# Patient Record
Sex: FEMALE | Race: WHITE | Employment: OTHER | URBAN - METROPOLITAN AREA
[De-identification: names, ages, dates, MRNs, and addresses within clinical notes are randomized per-mention and may not be internally consistent; named-entity substitution may affect disease eponyms.]

---

## 2017-10-10 PROBLEM — R13.14 PHARYNGOESOPHAGEAL DYSPHAGIA: Status: ACTIVE | Noted: 2017-10-10

## 2018-03-29 RX ORDER — NITROGLYCERIN 0.4 MG/1
0.4 TABLET SUBLINGUAL EVERY 5 MIN PRN
Qty: 25 TABLET | Refills: 3 | Status: SHIPPED | OUTPATIENT
Start: 2018-03-29

## 2018-05-27 ENCOUNTER — OFFICE VISIT (OUTPATIENT)
Dept: FAMILY MEDICINE CLINIC | Age: 83
End: 2018-05-27
Payer: MEDICARE

## 2018-05-27 VITALS
OXYGEN SATURATION: 95 % | DIASTOLIC BLOOD PRESSURE: 80 MMHG | SYSTOLIC BLOOD PRESSURE: 120 MMHG | HEART RATE: 75 BPM | TEMPERATURE: 99.1 F | BODY MASS INDEX: 35.08 KG/M2 | WEIGHT: 198 LBS | HEIGHT: 63 IN

## 2018-05-27 DIAGNOSIS — J01.10 ACUTE NON-RECURRENT FRONTAL SINUSITIS: Primary | ICD-10-CM

## 2018-05-27 PROCEDURE — 1123F ACP DISCUSS/DSCN MKR DOCD: CPT | Performed by: PHYSICIAN ASSISTANT

## 2018-05-27 PROCEDURE — 99213 OFFICE O/P EST LOW 20 MIN: CPT | Performed by: PHYSICIAN ASSISTANT

## 2018-05-27 PROCEDURE — G8417 CALC BMI ABV UP PARAM F/U: HCPCS | Performed by: PHYSICIAN ASSISTANT

## 2018-05-27 PROCEDURE — G8427 DOCREV CUR MEDS BY ELIG CLIN: HCPCS | Performed by: PHYSICIAN ASSISTANT

## 2018-05-27 PROCEDURE — 1090F PRES/ABSN URINE INCON ASSESS: CPT | Performed by: PHYSICIAN ASSISTANT

## 2018-05-27 PROCEDURE — 1036F TOBACCO NON-USER: CPT | Performed by: PHYSICIAN ASSISTANT

## 2018-05-27 PROCEDURE — 4040F PNEUMOC VAC/ADMIN/RCVD: CPT | Performed by: PHYSICIAN ASSISTANT

## 2018-05-27 PROCEDURE — G8400 PT W/DXA NO RESULTS DOC: HCPCS | Performed by: PHYSICIAN ASSISTANT

## 2018-05-27 PROCEDURE — G8598 ASA/ANTIPLAT THER USED: HCPCS | Performed by: PHYSICIAN ASSISTANT

## 2018-05-27 RX ORDER — BROMPHENIRAMINE MALEATE, PSEUDOEPHEDRINE HYDROCHLORIDE, AND DEXTROMETHORPHAN HYDROBROMIDE 2; 30; 10 MG/5ML; MG/5ML; MG/5ML
10 SYRUP ORAL 4 TIMES DAILY PRN
Qty: 120 ML | Refills: 0 | Status: SHIPPED | OUTPATIENT
Start: 2018-05-27 | End: 2018-07-17

## 2018-05-27 RX ORDER — AMOXICILLIN 500 MG/1
500 CAPSULE ORAL 3 TIMES DAILY
Qty: 30 CAPSULE | Refills: 0 | Status: SHIPPED | OUTPATIENT
Start: 2018-05-27 | End: 2018-06-06

## 2018-05-29 ENCOUNTER — OFFICE VISIT (OUTPATIENT)
Dept: FAMILY MEDICINE CLINIC | Age: 83
End: 2018-05-29
Payer: MEDICARE

## 2018-05-29 ENCOUNTER — HOSPITAL ENCOUNTER (OUTPATIENT)
Age: 83
Discharge: HOME | End: 2018-05-31
Payer: MEDICARE

## 2018-05-29 VITALS
DIASTOLIC BLOOD PRESSURE: 50 MMHG | WEIGHT: 196 LBS | HEART RATE: 71 BPM | OXYGEN SATURATION: 94 % | SYSTOLIC BLOOD PRESSURE: 100 MMHG | HEIGHT: 63 IN | BODY MASS INDEX: 34.73 KG/M2 | RESPIRATION RATE: 16 BRPM

## 2018-05-29 DIAGNOSIS — E86.0 DEHYDRATION: Primary | ICD-10-CM

## 2018-05-29 DIAGNOSIS — I95.9 HYPOTENSION, UNSPECIFIED HYPOTENSION TYPE: ICD-10-CM

## 2018-05-29 DIAGNOSIS — J06.9 VIRAL URI: ICD-10-CM

## 2018-05-29 LAB
BILIRUBIN, POC: NORMAL
BLOOD URINE, POC: NORMAL
CLARITY, POC: CLEAR
COLOR, POC: YELLOW
GLUCOSE URINE, POC: NORMAL
KETONES, POC: NORMAL
LEUKOCYTE EST, POC: NORMAL
NITRITE, POC: NORMAL
PH, POC: 5.5
PROTEIN, POC: NORMAL
SPECIFIC GRAVITY, POC: 1.02
UROBILINOGEN, POC: 0.2

## 2018-05-29 PROCEDURE — 1036F TOBACCO NON-USER: CPT | Performed by: FAMILY MEDICINE

## 2018-05-29 PROCEDURE — 36415 COLL VENOUS BLD VENIPUNCTURE: CPT | Performed by: FAMILY MEDICINE

## 2018-05-29 PROCEDURE — 81002 URINALYSIS NONAUTO W/O SCOPE: CPT | Performed by: FAMILY MEDICINE

## 2018-05-29 PROCEDURE — G8400 PT W/DXA NO RESULTS DOC: HCPCS | Performed by: FAMILY MEDICINE

## 2018-05-29 PROCEDURE — 80048 BASIC METABOLIC PNL TOTAL CA: CPT

## 2018-05-29 PROCEDURE — 1123F ACP DISCUSS/DSCN MKR DOCD: CPT | Performed by: FAMILY MEDICINE

## 2018-05-29 PROCEDURE — 4040F PNEUMOC VAC/ADMIN/RCVD: CPT | Performed by: FAMILY MEDICINE

## 2018-05-29 PROCEDURE — 1090F PRES/ABSN URINE INCON ASSESS: CPT | Performed by: FAMILY MEDICINE

## 2018-05-29 PROCEDURE — G8427 DOCREV CUR MEDS BY ELIG CLIN: HCPCS | Performed by: FAMILY MEDICINE

## 2018-05-29 PROCEDURE — G8598 ASA/ANTIPLAT THER USED: HCPCS | Performed by: FAMILY MEDICINE

## 2018-05-29 PROCEDURE — 99213 OFFICE O/P EST LOW 20 MIN: CPT | Performed by: FAMILY MEDICINE

## 2018-05-29 PROCEDURE — G8417 CALC BMI ABV UP PARAM F/U: HCPCS | Performed by: FAMILY MEDICINE

## 2018-05-29 ASSESSMENT — ENCOUNTER SYMPTOMS
ABDOMINAL PAIN: 0
SORE THROAT: 1
SHORTNESS OF BREATH: 0

## 2018-05-30 DIAGNOSIS — E86.0 DEHYDRATION: ICD-10-CM

## 2018-05-30 LAB
ANION GAP SERPL CALCULATED.3IONS-SCNC: 20 MMOL/L (ref 7–16)
BUN BLDV-MCNC: 19 MG/DL (ref 8–23)
CALCIUM SERPL-MCNC: 9.7 MG/DL (ref 8.6–10.2)
CHLORIDE BLD-SCNC: 95 MMOL/L (ref 98–107)
CO2: 23 MMOL/L (ref 22–29)
CREAT SERPL-MCNC: 1.4 MG/DL (ref 0.5–1)
GFR AFRICAN AMERICAN: 44
GFR NON-AFRICAN AMERICAN: 36 ML/MIN/1.73
GLUCOSE BLD-MCNC: 117 MG/DL (ref 74–109)
POTASSIUM SERPL-SCNC: 3.7 MMOL/L (ref 3.5–5)
SODIUM BLD-SCNC: 138 MMOL/L (ref 132–146)

## 2018-07-17 ENCOUNTER — OFFICE VISIT (OUTPATIENT)
Dept: FAMILY MEDICINE CLINIC | Age: 83
End: 2018-07-17
Payer: MEDICARE

## 2018-07-17 VITALS
BODY MASS INDEX: 35.08 KG/M2 | DIASTOLIC BLOOD PRESSURE: 82 MMHG | RESPIRATION RATE: 16 BRPM | OXYGEN SATURATION: 98 % | WEIGHT: 198 LBS | SYSTOLIC BLOOD PRESSURE: 132 MMHG | HEART RATE: 50 BPM | HEIGHT: 63 IN

## 2018-07-17 DIAGNOSIS — R42 DIZZINESS: ICD-10-CM

## 2018-07-17 DIAGNOSIS — I10 ESSENTIAL HYPERTENSION: Primary | ICD-10-CM

## 2018-07-17 DIAGNOSIS — R55 PRE-SYNCOPE: ICD-10-CM

## 2018-07-17 DIAGNOSIS — I25.10 CORONARY ARTERY DISEASE INVOLVING NATIVE CORONARY ARTERY OF NATIVE HEART WITHOUT ANGINA PECTORIS: ICD-10-CM

## 2018-07-17 PROCEDURE — 1090F PRES/ABSN URINE INCON ASSESS: CPT | Performed by: FAMILY MEDICINE

## 2018-07-17 PROCEDURE — G8400 PT W/DXA NO RESULTS DOC: HCPCS | Performed by: FAMILY MEDICINE

## 2018-07-17 PROCEDURE — 1036F TOBACCO NON-USER: CPT | Performed by: FAMILY MEDICINE

## 2018-07-17 PROCEDURE — 99213 OFFICE O/P EST LOW 20 MIN: CPT | Performed by: FAMILY MEDICINE

## 2018-07-17 PROCEDURE — 4040F PNEUMOC VAC/ADMIN/RCVD: CPT | Performed by: FAMILY MEDICINE

## 2018-07-17 PROCEDURE — G8417 CALC BMI ABV UP PARAM F/U: HCPCS | Performed by: FAMILY MEDICINE

## 2018-07-17 PROCEDURE — 1101F PT FALLS ASSESS-DOCD LE1/YR: CPT | Performed by: FAMILY MEDICINE

## 2018-07-17 PROCEDURE — 1123F ACP DISCUSS/DSCN MKR DOCD: CPT | Performed by: FAMILY MEDICINE

## 2018-07-17 PROCEDURE — G8598 ASA/ANTIPLAT THER USED: HCPCS | Performed by: FAMILY MEDICINE

## 2018-07-17 PROCEDURE — G8427 DOCREV CUR MEDS BY ELIG CLIN: HCPCS | Performed by: FAMILY MEDICINE

## 2018-07-17 ASSESSMENT — PATIENT HEALTH QUESTIONNAIRE - PHQ9
2. FEELING DOWN, DEPRESSED OR HOPELESS: 0
SUM OF ALL RESPONSES TO PHQ QUESTIONS 1-9: 0
SUM OF ALL RESPONSES TO PHQ9 QUESTIONS 1 & 2: 0
1. LITTLE INTEREST OR PLEASURE IN DOING THINGS: 0

## 2018-07-17 NOTE — PROGRESS NOTES
CC   Chief Complaint   Patient presents with    Hypertension     HPI:   Patient comes in today for the below issue(s). Recheck of dizziness  Thought to have hypotension at last visit triggering positional lightheaded/dizzy feeling  HCTZ was decreased  Issues are still present. Had 2 more severe episodes since last visit  Last 2 episodes Occurred typically in evening while seatied. Feels discomfort over the R side of head as well. She denies vision disturbances, dizziness, tinnitus, dysarthria, dysphagia, or other focal symptoms. She reports concern over underlying cerebrovascular disease. Review of Systems  Review of Systems   Constitutional: Positive for malaise/fatigue. Negative for weight loss. Respiratory: Negative for shortness of breath. Cardiovascular: Positive for leg swelling (Baseline). Negative for chest pain and palpitations. Neurological: Negative for tingling, sensory change, focal weakness, loss of consciousness and headaches.        Past Medical History:   Diagnosis Date    Arthritis     Basal cell cancer 1/2013    nose; Dr Jayant Callaway Coronary artery disease     bypass 2007; follows with Dr Moriah Maza yearly    Diastolic dysfunction 6262    stage 3; follows with Dr. Moriah Maza yearly    Dysphagia     Hyperlipidemia     Hypertension     Hypothyroid     Impaired glucose tolerance     Liver cyst     Lumbosacral radiculopathy at S1 12/2015    left sided    Macular degeneration 6/13    Renal cyst     seen by Dr Maggy Arce; benign    Vertigo 2/2015         PE:  VS:  /82   Pulse 50   Resp 16   Ht 5' 3\" (1.6 m)   Wt 198 lb (89.8 kg)   SpO2 98%   BMI 35.07 kg/m²   Physical Exam  General: Well nourished, well developed, no acute distress  Eyes:  PERRL   Conjunctiva unremarkable   Head: NCAT  ENT:  TM's intact bilaterally, no effusion   Nasal:  No mucosal edema     No nasal drainage   Oral:  mucosa moist and pink             no posterior pharyngeal drainage     no posterior pharyngeal exudate  Neck:  Supple   No palpable cervical lymphoadenopathy   Thyroid without mass or enlargement   No carotid bruits  Resp: Lungs CTAB   Equal and adequate air exchange without accessory muscle use   No rales, rhonchi or wheeze  CV: S1S2 RRR   No murmur   Intact distal pulses   1+ lower extremity edema  GI: Abdomen Soft, non tender, non distended   Normoactive bowel sounds  Skin Warm and dry; no rash or lesion  Neuro: Alert and oriented; no focal deficits identified  Psych: Euthymic mood, congruent affect       Assessment (including specific orders/meds/labs):      Kwaku Marcos was seen today for hypertension. Diagnoses and all orders for this visit:    Essential hypertension    Dizziness  -     MRI BRAIN WO CONTRAST; Future  -     VL DUP CAROTID BILATERAL; Future    Pre-syncope  -     MRI BRAIN WO CONTRAST; Future  -     VL DUP CAROTID BILATERAL; Future    Coronary artery disease involving native coronary artery of native heart without angina pectoris        Plan:     Blood pressures appear to be satisfactory. However she continues to experience focal episodes of vague symptoms that could be considered neurogenic or cardiovascular in origin. She is artery had an extensive cardiac workup within the last 12 months. I will check carotid ultrasounds and MRI of the brain. Open to exclude vascular disease carotids are brain, stroke, intracranial mass, or other focal brain findings    Counseled regarding above diagnosis, including possible risks and complications,  especially if left uncontrolled. Counseled regarding the possible side effects, risks, benefits and alternatives to treatment; patient and/or guardian verbalizes understanding, agrees, feels comfortable with and wishes to proceed with above treatment plan.     Call or go to ED immediately if symptoms worsen or persist. Advised patient to call with any new medication issues, and, as applicable, read all Rx info from pharmacy to assure aware of

## 2018-07-20 ASSESSMENT — ENCOUNTER SYMPTOMS: SHORTNESS OF BREATH: 0

## 2018-07-24 ENCOUNTER — HOSPITAL ENCOUNTER (OUTPATIENT)
Dept: MRI IMAGING | Age: 83
Discharge: HOME | End: 2018-07-26
Payer: MEDICARE

## 2018-07-24 ENCOUNTER — HOSPITAL ENCOUNTER (OUTPATIENT)
Dept: ULTRASOUND IMAGING | Age: 83
Discharge: HOME | End: 2018-07-26
Payer: MEDICARE

## 2018-07-24 DIAGNOSIS — R55 PRE-SYNCOPE: ICD-10-CM

## 2018-07-24 DIAGNOSIS — R42 DIZZINESS: ICD-10-CM

## 2018-07-24 PROCEDURE — 70551 MRI BRAIN STEM W/O DYE: CPT

## 2018-07-24 PROCEDURE — 93880 EXTRACRANIAL BILAT STUDY: CPT

## 2018-07-26 ENCOUNTER — OFFICE VISIT (OUTPATIENT)
Dept: CARDIOLOGY CLINIC | Age: 83
End: 2018-07-26
Payer: MEDICARE

## 2018-07-26 VITALS
HEIGHT: 63 IN | RESPIRATION RATE: 18 BRPM | WEIGHT: 199.8 LBS | SYSTOLIC BLOOD PRESSURE: 126 MMHG | HEART RATE: 52 BPM | BODY MASS INDEX: 35.4 KG/M2 | DIASTOLIC BLOOD PRESSURE: 70 MMHG

## 2018-07-26 DIAGNOSIS — I25.10 CORONARY ARTERY DISEASE INVOLVING NATIVE CORONARY ARTERY OF NATIVE HEART WITHOUT ANGINA PECTORIS: Primary | ICD-10-CM

## 2018-07-26 DIAGNOSIS — R00.1 BRADYCARDIA: ICD-10-CM

## 2018-07-26 DIAGNOSIS — E78.2 MIXED HYPERLIPIDEMIA: ICD-10-CM

## 2018-07-26 DIAGNOSIS — Z95.1 HX OF CABG: ICD-10-CM

## 2018-07-26 DIAGNOSIS — I10 ESSENTIAL HYPERTENSION: ICD-10-CM

## 2018-07-26 PROCEDURE — G8598 ASA/ANTIPLAT THER USED: HCPCS | Performed by: INTERNAL MEDICINE

## 2018-07-26 PROCEDURE — 1090F PRES/ABSN URINE INCON ASSESS: CPT | Performed by: INTERNAL MEDICINE

## 2018-07-26 PROCEDURE — 4040F PNEUMOC VAC/ADMIN/RCVD: CPT | Performed by: INTERNAL MEDICINE

## 2018-07-26 PROCEDURE — G8427 DOCREV CUR MEDS BY ELIG CLIN: HCPCS | Performed by: INTERNAL MEDICINE

## 2018-07-26 PROCEDURE — 93000 ELECTROCARDIOGRAM COMPLETE: CPT | Performed by: INTERNAL MEDICINE

## 2018-07-26 PROCEDURE — 1101F PT FALLS ASSESS-DOCD LE1/YR: CPT | Performed by: INTERNAL MEDICINE

## 2018-07-26 PROCEDURE — G8400 PT W/DXA NO RESULTS DOC: HCPCS | Performed by: INTERNAL MEDICINE

## 2018-07-26 PROCEDURE — 99213 OFFICE O/P EST LOW 20 MIN: CPT | Performed by: INTERNAL MEDICINE

## 2018-07-26 PROCEDURE — 1123F ACP DISCUSS/DSCN MKR DOCD: CPT | Performed by: INTERNAL MEDICINE

## 2018-07-26 PROCEDURE — 1036F TOBACCO NON-USER: CPT | Performed by: INTERNAL MEDICINE

## 2018-07-26 PROCEDURE — G8417 CALC BMI ABV UP PARAM F/U: HCPCS | Performed by: INTERNAL MEDICINE

## 2018-07-26 RX ORDER — GUANFACINE 1 MG/1
TABLET ORAL NIGHTLY
COMMUNITY
Start: 2018-06-10

## 2018-07-26 RX ORDER — VIT A/VIT C/VIT E/ZINC/COPPER 2148-113
TABLET ORAL 2 TIMES DAILY
COMMUNITY

## 2018-07-26 NOTE — PROGRESS NOTES
OFFICE FOLLOW-UP    Name: Erik Lucero    Age: 82 y.o. Date of Service: 7/26/2018    Chief Complaint: Follow-up for CAD s/p CABG in 2007, chest pain    Referring Physician: Dr. Savana Meredith    History of Present Illness:   Evaluated at [facility] on 1/9/18 for complaints of chest pain --> patient experienced subscapular pain/jaw pain that AM (lasted \"about 30 seconds\"). She reported a 2-3 month history of occasional chest pain at that time (left-sided, nonexertional, episodes last \"seconds/minute\"). Negative Lexiscan nuclear stress test on 1/9/18. No further subscapular/jaw pain. Currently with no active cardiac complaints at rest. BP today 126/70.     Review of Systems:  Cardiac: As per HPI  General: No fever, chills  Pulmonary: As per HPI  HEENT: No visual disturbances, difficult swallowing  GI: No nausea, vomiting, abdominal pain  Endocrine: +hyopthyroidism, no DM  Musculoskeletal: CHASE x 4, no focal motor deficits  Skin: Intact, no rashes  Neuro/Psych: No headache or seizures    Past Medical History:  Past Medical History:   Diagnosis Date    Arthritis     Basal cell cancer 1/2013    nose; Dr Sofy Bella Coronary artery disease     bypass 2007; follows with Dr Yasmeen Childs yearly    Diastolic dysfunction 7484    stage 3; follows with Dr. Yasmeen Childs yearly    Dysphagia     Hyperlipidemia     Hypertension     Hypothyroid     Impaired glucose tolerance     Liver cyst     Lumbosacral radiculopathy at S1 12/2015    left sided    Macular degeneration 6/13    Renal cyst     seen by Dr Tierney Panda; benign    Vertigo 2/2015       Past Surgical History:  Past Surgical History:   Procedure Laterality Date    APPENDECTOMY      CARDIAC SURGERY  2007    double bypass @ [facility] (Dr. Alan Martinez)   [address]      right    COLONOSCOPY  2007    Dr Jb Cabral      partial (ovaries remain)     Family History:  Family History   Problem Relation Age of Onset    Heart Disease Mother     Cancer Father lung       Social History:  Social History     Social History    Marital status:      Spouse name: N/A    Number of children: N/A    Years of education: N/A     Occupational History    Not on file.      Social History Main Topics    Smoking status: Former Smoker    Smokeless tobacco: Never Used      Comment: quit in her late 19's    Alcohol use Yes      Comment: rare    Drug use: No    Sexual activity: Not on file     Other Topics Concern    Not on file     Social History Narrative    No narrative on file     Allergies:  No Known Allergies    Current Medications:  Current Outpatient Prescriptions   Medication Sig Dispense Refill    guanFACINE (TENEX) 1 MG tablet       Multiple Vitamins-Minerals (PRESERVISION AREDS) TABS Take by mouth 2 times daily      oxybutynin (DITROPAN-XL) 5 MG extended release tablet Take 1 tablet by mouth every morning 90 tablet 3    furosemide (LASIX) 20 MG tablet TAKE 1 TABLET DAILY 90 tablet 1    enalapril (VASOTEC) 20 MG tablet TAKE 1 TABLET TWICE A  tablet 1    amLODIPine (NORVASC) 10 MG tablet TAKE 1 TABLET DAILY 90 tablet 1    cloNIDine (CATAPRES) 0.1 MG tablet TAKE 1 TABLET TWICE A  tablet 1    atorvastatin (LIPITOR) 20 MG tablet TAKE 1 TABLET DAILY 90 tablet 1    levothyroxine (SYNTHROID) 50 MCG tablet TAKE 1 TABLET DAILY 90 tablet 1    famotidine (PEPCID) 20 MG tablet TAKE 1 TABLET TWICE A  tablet 1    nitroGLYCERIN (NITROSTAT) 0.4 MG SL tablet Place 1 tablet under the tongue every 5 minutes as needed for Chest pain 25 tablet 3    calcium carbonate (CALCIUM 600) 600 MG TABS tablet Take 1 tablet by mouth 2 times daily      busPIRone (BUSPAR) 5 MG tablet Take 1 tablet by mouth as needed (as needed) 1 tab by mouth daily (Patient taking differently: Take 5 mg by mouth daily as needed ) 180 tablet 1    gabapentin (NEURONTIN) 100 MG capsule TAKE 1 CAPSULE BY MOUTH TWO TIMES DAILY (Patient taking differently: Take 100 mg by mouth 2 times daily .) 180 capsule 1    Biotin 25789 MCG TABS Take 1 tablet by mouth every morning       Calcium Polycarbophil (FIBER) 625 MG TABS Take 2 tablets by mouth every morning       vitamin B-12 (CYANOCOBALAMIN) 1000 MCG tablet Take 2,500 mcg by mouth every morning Instructed to hold 5 days pre-op 30 tablet 5    aspirin 81 MG tablet Take 81 mg by mouth nightly        No current facility-administered medications for this visit. Physical Exam:  /70   Pulse 52   Resp 18   Ht 5' 3\" (1.6 m)   Wt 199 lb 12.8 oz (90.6 kg)   BMI 35.39 kg/m²   Wt Readings from Last 3 Encounters:   07/26/18 199 lb 12.8 oz (90.6 kg)   07/17/18 198 lb (89.8 kg)   05/29/18 196 lb (88.9 kg)     Appearance: Awake, alert, no acute respiratory distress  Skin: Intact, no rash  Head: Normocephalic, atraumatic  Eyes: EOMI, no conjunctival erythema  ENMT: No pharyngeal erythema, MMM, no rhinorrhea  Neck: Supple, no elevated JVP, no carotid bruits  Lungs: Clear to auscultation bilaterally. No wheezes, rales, or rhonchi.   Cardiac: Bradycardic, regular rhythm, +S1S2, no murmurs apparent  Abdomen: Soft, nontender, +bowel sounds  Extremities: Moves all extremities x 4, 1+ lower extremity edema  Neurologic: No focal motor deficits apparent, normal mood and affect    Laboratory Tests:  Lab Results   Component Value Date    CREATININE 1.4 (H) 05/29/2018    BUN 19 05/29/2018     05/29/2018    K 3.7 05/29/2018    CL 95 (L) 05/29/2018    CO2 23 05/29/2018     Lab Results   Component Value Date     (H) 04/15/2012     Lab Results   Component Value Date    WBC 5.4 02/01/2018    RBC 4.56 02/01/2018    HGB 12.9 02/01/2018    HCT 41.1 02/01/2018    MCV 90.1 02/01/2018    MCH 28.3 02/01/2018    MCHC 31.4 02/01/2018    RDW 14.6 02/01/2018     02/01/2018    MPV 11.4 02/01/2018     Lab Results   Component Value Date    MG 2.0 02/01/2018     Lab Results   Component Value Date    PROTIME 11.1 03/07/2016    PROTIME 11.1 04/15/2012    INR 1.0 03/07/2016     Lab Results   Component Value Date    TSH 1.210 09/18/2017     Lab Results   Component Value Date    TRIG 90 07/10/2017    TRIG 127 09/12/2016    TRIG 86 03/08/2016     Lab Results   Component Value Date    HDL 72 07/10/2017    HDL 62 09/12/2016    HDL 57 03/08/2016     Lab Results   Component Value Date    LDLCALC 58 07/10/2017    LDLCALC 51 09/12/2016    LDLCALC 49 03/08/2016     Lab Results   Component Value Date    MG 2.0 02/01/2018     Cardiac Tests:  ECG: SB, rate 48, NSSTT changes    February 2015 echocardiogram: EF 60%, normal RV function, stage 2 DD, mild valve disease    Echocardiogram: 3/8/16 (read by Dr. Basim Ronquillo)  Normal left ventricle size and systolic function  Ejection fraction is visually estimated at 65%. Mild left ventricular concentric hypertrophy noted. The left atrium is mildly dilated. Mild mitral regurgitation is present. Mild tricuspid regurgitation. VULCUN nuclear stress test: 3/8/16  1. No evidence of pharmacologic stress-induced myocardial   ischemia. 2. No focal wall motion abnormalities are demonstrated. 3. The left ventricular ejection fraction is 68%. [employer] nuclear stress test: 1/9/17  1. No reversible perfusion defect   2. Ejection fraction is 62 %. 3. No significant wall motion abnormality     Impression/Plan:  1. Evaluation on 1/9/18 at [facility] for chest pain -- no further episodes, negative Lexiscan nuclear stress test on 1/9/18  2. CAD/MI s/p CABG in 2007 (LIMA-LAD and SVG-LCx. Negative stress test > 3 years ago per patient and negative Lexiscan nuclear stress test on 3/8/16 and 1/9/18. 3. HTN - BP today 126/70 ('s-130's at prior cardiology office visits); on multiple antihypertensive agents  4. HLD - on statin  5. Diastolic dysfunction   6. Hypothyroidism - normal TSH in 9/2017  7. CKD  8.  History of sinus bradycardia - coreg dose decreased to 3.125 mg BID in 11/2016 and then subsequently discontinued, previously wore cardiac monitor,

## 2018-08-13 NOTE — PROGRESS NOTES
was seen today for results. Diagnoses and all orders for this visit:    Essential hypertension    Dizziness    Pre-syncope        Imaging and testing reviewed with patient. No significant abnormalities identified. Counseled on continued testing or observation. She is comfortable observing. She'll let us know if symptoms persist. Continue to monitor blood pressure. Counseled regarding above diagnosis, including possible risks and complications,  especially if left uncontrolled. Counseled regarding the possible side effects, risks, benefits and alternatives to treatment; patient and/or guardian verbalizes understanding, agrees, feels comfortable with and wishes to proceed with above treatment plan. Call or go to ED immediately if symptoms worsen or persist. Advised patient to call with any new medication issues, and, as applicable, read all Rx info from pharmacy to assure aware of all possible risks and side effects of medication before taking. As applicable, educational materials and/or home exercises printed for patient's review and were included in patient instructions on his/her After Visit Summary and given to patient at the end of visit. Patient and/or guardian given opportunity to ask questions/raise concerns. She verbalized comfort and understanding of instructions. Follow Up:     Return in about 3 months (around 11/14/2018) for HTN. or sooner if the above issues change unexpectedly or new issues develop     This document was prepared at least partially through the use of voice recognition software. Although effort is taken to assure the accuracy of this document, it is possible that grammatical, syntax,  or spelling errors may occur.       Electronically signed by Mohan Perez MD [facility]

## 2018-08-14 ENCOUNTER — OFFICE VISIT (OUTPATIENT)
Dept: FAMILY MEDICINE CLINIC | Age: 83
End: 2018-08-14
Payer: MEDICARE

## 2018-08-14 VITALS
OXYGEN SATURATION: 97 % | BODY MASS INDEX: 35.44 KG/M2 | SYSTOLIC BLOOD PRESSURE: 128 MMHG | HEART RATE: 64 BPM | HEIGHT: 63 IN | WEIGHT: 200 LBS | DIASTOLIC BLOOD PRESSURE: 86 MMHG | RESPIRATION RATE: 16 BRPM

## 2018-08-14 DIAGNOSIS — R55 PRE-SYNCOPE: ICD-10-CM

## 2018-08-14 DIAGNOSIS — I10 ESSENTIAL HYPERTENSION: Primary | ICD-10-CM

## 2018-08-14 DIAGNOSIS — R42 DIZZINESS: ICD-10-CM

## 2018-08-14 PROCEDURE — 1101F PT FALLS ASSESS-DOCD LE1/YR: CPT | Performed by: FAMILY MEDICINE

## 2018-08-14 PROCEDURE — 4040F PNEUMOC VAC/ADMIN/RCVD: CPT | Performed by: FAMILY MEDICINE

## 2018-08-14 PROCEDURE — G8417 CALC BMI ABV UP PARAM F/U: HCPCS | Performed by: FAMILY MEDICINE

## 2018-08-14 PROCEDURE — G8598 ASA/ANTIPLAT THER USED: HCPCS | Performed by: FAMILY MEDICINE

## 2018-08-14 PROCEDURE — 1036F TOBACCO NON-USER: CPT | Performed by: FAMILY MEDICINE

## 2018-08-14 PROCEDURE — G8400 PT W/DXA NO RESULTS DOC: HCPCS | Performed by: FAMILY MEDICINE

## 2018-08-14 PROCEDURE — G8427 DOCREV CUR MEDS BY ELIG CLIN: HCPCS | Performed by: FAMILY MEDICINE

## 2018-08-14 PROCEDURE — 1123F ACP DISCUSS/DSCN MKR DOCD: CPT | Performed by: FAMILY MEDICINE

## 2018-08-14 PROCEDURE — 99213 OFFICE O/P EST LOW 20 MIN: CPT | Performed by: FAMILY MEDICINE

## 2018-08-14 PROCEDURE — 1090F PRES/ABSN URINE INCON ASSESS: CPT | Performed by: FAMILY MEDICINE

## 2018-09-21 ENCOUNTER — TELEPHONE (OUTPATIENT)
Dept: SURGERY | Age: 83
End: 2018-09-21

## 2018-11-15 ENCOUNTER — HOSPITAL ENCOUNTER (OUTPATIENT)
Age: 83
Discharge: HOME | End: 2018-11-17
Payer: MEDICARE

## 2018-11-15 ENCOUNTER — OFFICE VISIT (OUTPATIENT)
Dept: FAMILY MEDICINE CLINIC | Age: 83
End: 2018-11-15
Payer: MEDICARE

## 2018-11-15 VITALS
RESPIRATION RATE: 16 BRPM | BODY MASS INDEX: 35.61 KG/M2 | SYSTOLIC BLOOD PRESSURE: 144 MMHG | DIASTOLIC BLOOD PRESSURE: 62 MMHG | HEIGHT: 63 IN | WEIGHT: 201 LBS | OXYGEN SATURATION: 97 % | HEART RATE: 47 BPM

## 2018-11-15 DIAGNOSIS — S76.911A MUSCLE STRAIN OF THIGH, RIGHT, INITIAL ENCOUNTER: Primary | ICD-10-CM

## 2018-11-15 DIAGNOSIS — N18.30 CKD (CHRONIC KIDNEY DISEASE) STAGE 3, GFR 30-59 ML/MIN (HCC): ICD-10-CM

## 2018-11-15 DIAGNOSIS — I10 ESSENTIAL HYPERTENSION: ICD-10-CM

## 2018-11-15 DIAGNOSIS — R07.9 CHEST PAIN, UNSPECIFIED TYPE: ICD-10-CM

## 2018-11-15 LAB
ALBUMIN SERPL-MCNC: 4.2 G/DL (ref 3.5–5.2)
ALP BLD-CCNC: 112 U/L (ref 35–104)
ALT SERPL-CCNC: 15 U/L (ref 0–32)
ANION GAP SERPL CALCULATED.3IONS-SCNC: 15 MMOL/L (ref 7–16)
AST SERPL-CCNC: 18 U/L (ref 0–31)
BASOPHILS ABSOLUTE: 0.05 E9/L (ref 0–0.2)
BASOPHILS RELATIVE PERCENT: 0.8 % (ref 0–2)
BILIRUB SERPL-MCNC: 0.6 MG/DL (ref 0–1.2)
BUN BLDV-MCNC: 28 MG/DL (ref 8–23)
CALCIUM SERPL-MCNC: 10 MG/DL (ref 8.6–10.2)
CHLORIDE BLD-SCNC: 106 MMOL/L (ref 98–107)
CHOLESTEROL, TOTAL: 139 MG/DL (ref 0–199)
CO2: 27 MMOL/L (ref 22–29)
CREAT SERPL-MCNC: 1.3 MG/DL (ref 0.5–1)
EOSINOPHILS ABSOLUTE: 0.28 E9/L (ref 0.05–0.5)
EOSINOPHILS RELATIVE PERCENT: 4.7 % (ref 0–6)
GFR AFRICAN AMERICAN: 47
GFR NON-AFRICAN AMERICAN: 39 ML/MIN/1.73
GLUCOSE BLD-MCNC: 94 MG/DL (ref 74–99)
HCT VFR BLD CALC: 42.6 % (ref 34–48)
HDLC SERPL-MCNC: 69 MG/DL
HEMOGLOBIN: 13.4 G/DL (ref 11.5–15.5)
IMMATURE GRANULOCYTES #: 0.01 E9/L
IMMATURE GRANULOCYTES %: 0.2 % (ref 0–5)
LDL CHOLESTEROL CALCULATED: 55 MG/DL (ref 0–99)
LYMPHOCYTES ABSOLUTE: 1.66 E9/L (ref 1.5–4)
LYMPHOCYTES RELATIVE PERCENT: 27.6 % (ref 20–42)
MCH RBC QN AUTO: 29.1 PG (ref 26–35)
MCHC RBC AUTO-ENTMCNC: 31.5 % (ref 32–34.5)
MCV RBC AUTO: 92.6 FL (ref 80–99.9)
MONOCYTES ABSOLUTE: 0.48 E9/L (ref 0.1–0.95)
MONOCYTES RELATIVE PERCENT: 8 % (ref 2–12)
NEUTROPHILS ABSOLUTE: 3.53 E9/L (ref 1.8–7.3)
NEUTROPHILS RELATIVE PERCENT: 58.7 % (ref 43–80)
PDW BLD-RTO: 14.7 FL (ref 11.5–15)
PLATELET # BLD: 234 E9/L (ref 130–450)
PMV BLD AUTO: 11.7 FL (ref 7–12)
POTASSIUM SERPL-SCNC: 3.8 MMOL/L (ref 3.5–5)
RBC # BLD: 4.6 E12/L (ref 3.5–5.5)
SODIUM BLD-SCNC: 148 MMOL/L (ref 132–146)
TOTAL PROTEIN: 7.5 G/DL (ref 6.4–8.3)
TRIGL SERPL-MCNC: 76 MG/DL (ref 0–149)
TSH SERPL DL<=0.05 MIU/L-ACNC: 2.14 UIU/ML (ref 0.27–4.2)
VLDLC SERPL CALC-MCNC: 15 MG/DL
WBC # BLD: 6 E9/L (ref 4.5–11.5)

## 2018-11-15 PROCEDURE — 1036F TOBACCO NON-USER: CPT | Performed by: FAMILY MEDICINE

## 2018-11-15 PROCEDURE — 36415 COLL VENOUS BLD VENIPUNCTURE: CPT | Performed by: FAMILY MEDICINE

## 2018-11-15 PROCEDURE — 4040F PNEUMOC VAC/ADMIN/RCVD: CPT | Performed by: FAMILY MEDICINE

## 2018-11-15 PROCEDURE — 1101F PT FALLS ASSESS-DOCD LE1/YR: CPT | Performed by: FAMILY MEDICINE

## 2018-11-15 PROCEDURE — G8482 FLU IMMUNIZE ORDER/ADMIN: HCPCS | Performed by: FAMILY MEDICINE

## 2018-11-15 PROCEDURE — 1123F ACP DISCUSS/DSCN MKR DOCD: CPT | Performed by: FAMILY MEDICINE

## 2018-11-15 PROCEDURE — G8598 ASA/ANTIPLAT THER USED: HCPCS | Performed by: FAMILY MEDICINE

## 2018-11-15 PROCEDURE — G8417 CALC BMI ABV UP PARAM F/U: HCPCS | Performed by: FAMILY MEDICINE

## 2018-11-15 PROCEDURE — 99214 OFFICE O/P EST MOD 30 MIN: CPT | Performed by: FAMILY MEDICINE

## 2018-11-15 PROCEDURE — 1090F PRES/ABSN URINE INCON ASSESS: CPT | Performed by: FAMILY MEDICINE

## 2018-11-15 PROCEDURE — 80061 LIPID PANEL: CPT

## 2018-11-15 PROCEDURE — 93000 ELECTROCARDIOGRAM COMPLETE: CPT | Performed by: FAMILY MEDICINE

## 2018-11-15 PROCEDURE — G8427 DOCREV CUR MEDS BY ELIG CLIN: HCPCS | Performed by: FAMILY MEDICINE

## 2018-11-15 PROCEDURE — 85025 COMPLETE CBC W/AUTO DIFF WBC: CPT

## 2018-11-15 PROCEDURE — G8400 PT W/DXA NO RESULTS DOC: HCPCS | Performed by: FAMILY MEDICINE

## 2018-11-15 PROCEDURE — 80053 COMPREHEN METABOLIC PANEL: CPT

## 2018-11-15 PROCEDURE — 84443 ASSAY THYROID STIM HORMONE: CPT

## 2018-11-16 ASSESSMENT — ENCOUNTER SYMPTOMS
WHEEZING: 0
SHORTNESS OF BREATH: 0
ABDOMINAL PAIN: 0
COUGH: 0
TROUBLE SWALLOWING: 0
BACK PAIN: 1

## 2018-11-28 ENCOUNTER — OFFICE VISIT (OUTPATIENT)
Dept: FAMILY MEDICINE CLINIC | Age: 83
End: 2018-11-28
Payer: MEDICARE

## 2018-11-28 VITALS
DIASTOLIC BLOOD PRESSURE: 77 MMHG | HEIGHT: 63 IN | RESPIRATION RATE: 16 BRPM | SYSTOLIC BLOOD PRESSURE: 142 MMHG | BODY MASS INDEX: 35.97 KG/M2 | OXYGEN SATURATION: 97 % | HEART RATE: 51 BPM | WEIGHT: 203 LBS

## 2018-11-28 DIAGNOSIS — B96.89 ACUTE BACTERIAL SINUSITIS: ICD-10-CM

## 2018-11-28 DIAGNOSIS — R00.1 BRADYCARDIA: ICD-10-CM

## 2018-11-28 DIAGNOSIS — J01.90 ACUTE BACTERIAL SINUSITIS: ICD-10-CM

## 2018-11-28 DIAGNOSIS — R59.0 ENLARGED LYMPH NODE IN NECK: Primary | ICD-10-CM

## 2018-11-28 PROCEDURE — G8400 PT W/DXA NO RESULTS DOC: HCPCS | Performed by: FAMILY MEDICINE

## 2018-11-28 PROCEDURE — 1036F TOBACCO NON-USER: CPT | Performed by: FAMILY MEDICINE

## 2018-11-28 PROCEDURE — 1101F PT FALLS ASSESS-DOCD LE1/YR: CPT | Performed by: FAMILY MEDICINE

## 2018-11-28 PROCEDURE — 1090F PRES/ABSN URINE INCON ASSESS: CPT | Performed by: FAMILY MEDICINE

## 2018-11-28 PROCEDURE — 1123F ACP DISCUSS/DSCN MKR DOCD: CPT | Performed by: FAMILY MEDICINE

## 2018-11-28 PROCEDURE — 4040F PNEUMOC VAC/ADMIN/RCVD: CPT | Performed by: FAMILY MEDICINE

## 2018-11-28 PROCEDURE — G8427 DOCREV CUR MEDS BY ELIG CLIN: HCPCS | Performed by: FAMILY MEDICINE

## 2018-11-28 PROCEDURE — G8417 CALC BMI ABV UP PARAM F/U: HCPCS | Performed by: FAMILY MEDICINE

## 2018-11-28 PROCEDURE — G8482 FLU IMMUNIZE ORDER/ADMIN: HCPCS | Performed by: FAMILY MEDICINE

## 2018-11-28 PROCEDURE — G8598 ASA/ANTIPLAT THER USED: HCPCS | Performed by: FAMILY MEDICINE

## 2018-11-28 PROCEDURE — 93000 ELECTROCARDIOGRAM COMPLETE: CPT | Performed by: FAMILY MEDICINE

## 2018-11-28 PROCEDURE — 99213 OFFICE O/P EST LOW 20 MIN: CPT | Performed by: FAMILY MEDICINE

## 2018-11-28 RX ORDER — FLUTICASONE PROPIONATE 50 MCG
1 SPRAY, SUSPENSION (ML) NASAL DAILY
Qty: 1 BOTTLE | Refills: 2 | Status: SHIPPED | OUTPATIENT
Start: 2018-11-28 | End: 2019-09-04 | Stop reason: CLARIF

## 2018-11-28 RX ORDER — AMOXICILLIN 875 MG/1
875 TABLET, COATED ORAL 2 TIMES DAILY
Qty: 20 TABLET | Refills: 0 | Status: SHIPPED | OUTPATIENT
Start: 2018-11-28 | End: 2018-12-08

## 2018-11-28 NOTE — PROGRESS NOTES
CC   Chief Complaint   Patient presents with    Neck Pain     HPI:   Patient comes in today for the below issue(s). Neck and jaw pain  New complaint, present for a few weeks. Off and on  R sided, tender under the mandible  Worse with opening mouth wide, yawning, or palpating the glands   No relation to gum pain or dental pain  R ear feels clogged  no fevers      Review of Systems  Review of Systems   Constitutional: Positive for fatigue. Negative for chills and fever. Respiratory: Negative for choking and shortness of breath. Cardiovascular: Negative for chest pain and palpitations.        Past Medical History:   Diagnosis Date    Arthritis     Basal cell cancer 1/2013    nose; Dr Fawn Chilel Coronary artery disease     bypass 2007; follows with Dr Judi Santiago yearly    Diastolic dysfunction 7878    stage 3; follows with Dr. Judi Santiago yearly    Dysphagia     Hyperlipidemia     Hypertension     Hypothyroid     Impaired glucose tolerance     Liver cyst     Lumbosacral radiculopathy at S1 12/2015    left sided    Macular degeneration 6/13    Renal cyst     seen by Dr Rufina Sams; benign    Vertigo 2/2015         PE:  VS:  BP (!) 142/77   Pulse 51   Resp 16   Ht 5' 3\" (1.6 m)   Wt 203 lb (92.1 kg)   SpO2 97%   BMI 35.96 kg/m²   Physical Exam  General: Well nourished, well developed, no acute distress  Eyes:  PERRL   Conjunctiva unremarkable   ENT:  TM's intact bilaterally, no effusion   Nasal:  +mucosal edema     No nasal drainage   Oral:  mucosa moist and pink             +posterior pharyngeal drainage     no posterior pharyngeal exudate    +R sided submandibular LAD   Neck:  Supple   No other palpable cervical lymphoadenopathy   Thyroid without mass or enlargement  Resp: Lungs CTAB   Equal and adequate air exchange without accessory muscle use   No rales, rhonchi or wheeze  CV: S1S2 yasmine but regular   No murmur   Intact distal pulses   No edema  GI: Abdomen Soft, non tender, non

## 2018-12-01 ASSESSMENT — ENCOUNTER SYMPTOMS
CHOKING: 0
SHORTNESS OF BREATH: 0

## 2019-01-02 ENCOUNTER — OFFICE VISIT (OUTPATIENT)
Dept: FAMILY MEDICINE CLINIC | Age: 84
End: 2019-01-02
Payer: MEDICARE

## 2019-01-02 VITALS
BODY MASS INDEX: 35.61 KG/M2 | SYSTOLIC BLOOD PRESSURE: 136 MMHG | HEART RATE: 61 BPM | RESPIRATION RATE: 16 BRPM | HEIGHT: 63 IN | TEMPERATURE: 98.2 F | OXYGEN SATURATION: 98 % | WEIGHT: 201 LBS | DIASTOLIC BLOOD PRESSURE: 82 MMHG

## 2019-01-02 DIAGNOSIS — R68.84 JAW PAIN, NON-TMJ: ICD-10-CM

## 2019-01-02 DIAGNOSIS — R00.2 PALPITATIONS: Primary | ICD-10-CM

## 2019-01-02 PROCEDURE — 1123F ACP DISCUSS/DSCN MKR DOCD: CPT | Performed by: FAMILY MEDICINE

## 2019-01-02 PROCEDURE — 1101F PT FALLS ASSESS-DOCD LE1/YR: CPT | Performed by: FAMILY MEDICINE

## 2019-01-02 PROCEDURE — 1036F TOBACCO NON-USER: CPT | Performed by: FAMILY MEDICINE

## 2019-01-02 PROCEDURE — 4040F PNEUMOC VAC/ADMIN/RCVD: CPT | Performed by: FAMILY MEDICINE

## 2019-01-02 PROCEDURE — 99213 OFFICE O/P EST LOW 20 MIN: CPT | Performed by: FAMILY MEDICINE

## 2019-01-02 PROCEDURE — G8400 PT W/DXA NO RESULTS DOC: HCPCS | Performed by: FAMILY MEDICINE

## 2019-01-02 PROCEDURE — G8598 ASA/ANTIPLAT THER USED: HCPCS | Performed by: FAMILY MEDICINE

## 2019-01-02 PROCEDURE — G8417 CALC BMI ABV UP PARAM F/U: HCPCS | Performed by: FAMILY MEDICINE

## 2019-01-02 PROCEDURE — G8427 DOCREV CUR MEDS BY ELIG CLIN: HCPCS | Performed by: FAMILY MEDICINE

## 2019-01-02 PROCEDURE — 93000 ELECTROCARDIOGRAM COMPLETE: CPT | Performed by: FAMILY MEDICINE

## 2019-01-02 PROCEDURE — G8482 FLU IMMUNIZE ORDER/ADMIN: HCPCS | Performed by: FAMILY MEDICINE

## 2019-01-02 PROCEDURE — 1090F PRES/ABSN URINE INCON ASSESS: CPT | Performed by: FAMILY MEDICINE

## 2019-01-04 ASSESSMENT — ENCOUNTER SYMPTOMS
TROUBLE SWALLOWING: 0
WHEEZING: 0
ABDOMINAL PAIN: 0
SHORTNESS OF BREATH: 0
VOICE CHANGE: 0
SORE THROAT: 0

## 2019-03-05 ENCOUNTER — OFFICE VISIT (OUTPATIENT)
Dept: FAMILY MEDICINE CLINIC | Age: 84
End: 2019-03-05
Payer: MEDICARE

## 2019-03-05 VITALS
SYSTOLIC BLOOD PRESSURE: 141 MMHG | HEIGHT: 63 IN | DIASTOLIC BLOOD PRESSURE: 77 MMHG | RESPIRATION RATE: 16 BRPM | OXYGEN SATURATION: 97 % | BODY MASS INDEX: 35.61 KG/M2 | HEART RATE: 57 BPM | WEIGHT: 201 LBS

## 2019-03-05 DIAGNOSIS — I10 ESSENTIAL HYPERTENSION: ICD-10-CM

## 2019-03-05 DIAGNOSIS — R59.0 ENLARGED LYMPH NODE IN NECK: Primary | ICD-10-CM

## 2019-03-05 DIAGNOSIS — I25.10 CORONARY ARTERY DISEASE INVOLVING NATIVE CORONARY ARTERY OF NATIVE HEART WITHOUT ANGINA PECTORIS: ICD-10-CM

## 2019-03-05 PROCEDURE — 4040F PNEUMOC VAC/ADMIN/RCVD: CPT | Performed by: FAMILY MEDICINE

## 2019-03-05 PROCEDURE — 1090F PRES/ABSN URINE INCON ASSESS: CPT | Performed by: FAMILY MEDICINE

## 2019-03-05 PROCEDURE — G8400 PT W/DXA NO RESULTS DOC: HCPCS | Performed by: FAMILY MEDICINE

## 2019-03-05 PROCEDURE — G8482 FLU IMMUNIZE ORDER/ADMIN: HCPCS | Performed by: FAMILY MEDICINE

## 2019-03-05 PROCEDURE — 99213 OFFICE O/P EST LOW 20 MIN: CPT | Performed by: FAMILY MEDICINE

## 2019-03-05 PROCEDURE — 1036F TOBACCO NON-USER: CPT | Performed by: FAMILY MEDICINE

## 2019-03-05 PROCEDURE — G8598 ASA/ANTIPLAT THER USED: HCPCS | Performed by: FAMILY MEDICINE

## 2019-03-05 PROCEDURE — G8427 DOCREV CUR MEDS BY ELIG CLIN: HCPCS | Performed by: FAMILY MEDICINE

## 2019-03-05 PROCEDURE — G8417 CALC BMI ABV UP PARAM F/U: HCPCS | Performed by: FAMILY MEDICINE

## 2019-03-05 PROCEDURE — 1123F ACP DISCUSS/DSCN MKR DOCD: CPT | Performed by: FAMILY MEDICINE

## 2019-03-05 PROCEDURE — 1101F PT FALLS ASSESS-DOCD LE1/YR: CPT | Performed by: FAMILY MEDICINE

## 2019-03-05 RX ORDER — NIFEDIPINE 30 MG/1
TABLET, EXTENDED RELEASE ORAL
Refills: 0 | COMMUNITY
Start: 2019-01-08 | End: 2020-01-09 | Stop reason: SDUPTHER

## 2019-03-05 ASSESSMENT — PATIENT HEALTH QUESTIONNAIRE - PHQ9
1. LITTLE INTEREST OR PLEASURE IN DOING THINGS: 0
SUM OF ALL RESPONSES TO PHQ QUESTIONS 1-9: 0
SUM OF ALL RESPONSES TO PHQ QUESTIONS 1-9: 0
2. FEELING DOWN, DEPRESSED OR HOPELESS: 0
SUM OF ALL RESPONSES TO PHQ9 QUESTIONS 1 & 2: 0

## 2019-03-06 ASSESSMENT — ENCOUNTER SYMPTOMS
CHOKING: 0
WHEEZING: 0
COUGH: 0
ABDOMINAL PAIN: 0
SHORTNESS OF BREATH: 0

## 2019-03-09 ENCOUNTER — HOSPITAL ENCOUNTER (OUTPATIENT)
Dept: CT IMAGING | Age: 84
Discharge: HOME | End: 2019-03-11
Payer: MEDICARE

## 2019-03-09 DIAGNOSIS — R59.0 ENLARGED LYMPH NODE IN NECK: ICD-10-CM

## 2019-03-09 PROCEDURE — 70490 CT SOFT TISSUE NECK W/O DYE: CPT

## 2019-04-11 ENCOUNTER — HOSPITAL ENCOUNTER (OUTPATIENT)
Age: 84
Discharge: HOME | End: 2019-04-13
Payer: MEDICARE

## 2019-04-11 ENCOUNTER — OFFICE VISIT (OUTPATIENT)
Dept: FAMILY MEDICINE CLINIC | Age: 84
End: 2019-04-11
Payer: MEDICARE

## 2019-04-11 ENCOUNTER — HOSPITAL ENCOUNTER (OUTPATIENT)
Dept: GENERAL RADIOLOGY | Age: 84
Discharge: HOME | End: 2019-04-13
Payer: MEDICARE

## 2019-04-11 VITALS
SYSTOLIC BLOOD PRESSURE: 133 MMHG | WEIGHT: 201 LBS | DIASTOLIC BLOOD PRESSURE: 79 MMHG | OXYGEN SATURATION: 98 % | BODY MASS INDEX: 35.61 KG/M2 | RESPIRATION RATE: 16 BRPM | HEART RATE: 57 BPM | HEIGHT: 63 IN

## 2019-04-11 DIAGNOSIS — M79.672 ACUTE FOOT PAIN, LEFT: Primary | ICD-10-CM

## 2019-04-11 DIAGNOSIS — M79.672 ACUTE FOOT PAIN, LEFT: ICD-10-CM

## 2019-04-11 PROCEDURE — G8427 DOCREV CUR MEDS BY ELIG CLIN: HCPCS | Performed by: FAMILY MEDICINE

## 2019-04-11 PROCEDURE — 73630 X-RAY EXAM OF FOOT: CPT

## 2019-04-11 PROCEDURE — 4040F PNEUMOC VAC/ADMIN/RCVD: CPT | Performed by: FAMILY MEDICINE

## 2019-04-11 PROCEDURE — G8417 CALC BMI ABV UP PARAM F/U: HCPCS | Performed by: FAMILY MEDICINE

## 2019-04-11 PROCEDURE — 1123F ACP DISCUSS/DSCN MKR DOCD: CPT | Performed by: FAMILY MEDICINE

## 2019-04-11 PROCEDURE — G8598 ASA/ANTIPLAT THER USED: HCPCS | Performed by: FAMILY MEDICINE

## 2019-04-11 PROCEDURE — 1036F TOBACCO NON-USER: CPT | Performed by: FAMILY MEDICINE

## 2019-04-11 PROCEDURE — 99213 OFFICE O/P EST LOW 20 MIN: CPT | Performed by: FAMILY MEDICINE

## 2019-04-11 PROCEDURE — G8400 PT W/DXA NO RESULTS DOC: HCPCS | Performed by: FAMILY MEDICINE

## 2019-04-11 PROCEDURE — 1090F PRES/ABSN URINE INCON ASSESS: CPT | Performed by: FAMILY MEDICINE

## 2019-04-11 NOTE — PROGRESS NOTES
CC   Chief Complaint   Patient presents with    Foot Pain     left     HPI:   Patient comes in today for the below issue(s). Left dorsal foot pain  New issue  Occurred 5 days ago  Sudden onset of pain over dorsal medial aspect of foot  No injury; onset was while at rest  Has mildly improved since onset  Able to ambulate but with limp   Feels better with supporting shoes  Worse with ambulation  Denies new swelling or redness    Review of Systems  Review of Systems   Constitutional: Negative for fever. Musculoskeletal: Positive for arthralgias and gait problem. Past Medical History:   Diagnosis Date    Arthritis     Basal cell cancer 1/2013    nose; Dr Rudolpho Lefort Coronary artery disease     bypass 2007; follows with Dr Davina Alvarez yearly    Diastolic dysfunction 1509    stage 3; follows with Dr. Davina Alvarez yearly    Dysphagia     Hyperlipidemia     Hypertension     Hypothyroid     Impaired glucose tolerance     Liver cyst     Lumbosacral radiculopathy at S1 12/2015    left sided    Macular degeneration 6/13    Renal cyst     seen by Dr Radhika Somers; benign    Vertigo 2/2015         PE:  VS:  /79   Pulse 57   Resp 16   Ht 5' 3\" (1.6 m)   Wt 201 lb (91.2 kg)   LMP  (LMP Unknown)   SpO2 98%   BMI 35.61 kg/m²   Physical Exam   Constitutional: She appears well-developed and well-nourished. No distress. Cardiovascular: Intact distal pulses. Musculoskeletal:        Left foot: There is tenderness (over 2nd metatarsal). There is normal range of motion, no swelling, no crepitus and no deformity. Vitals reviewed. Assessment (including specific orders/meds/labs):      Magdalena Alvarez was seen today for foot pain. Diagnoses and all orders for this visit:    Acute foot pain, left  -     XR FOOT LEFT (MIN 3 VIEWS); Future        Plan:         Counseled regarding above diagnosis, including possible risks and complications,  especially if left uncontrolled.  Counseled regarding the possible

## 2019-06-06 ENCOUNTER — PATIENT MESSAGE (OUTPATIENT)
Dept: FAMILY MEDICINE CLINIC | Age: 84
End: 2019-06-06

## 2019-06-06 DIAGNOSIS — Z79.899 MEDICATION MANAGEMENT: ICD-10-CM

## 2019-06-06 DIAGNOSIS — R13.14 PHARYNGOESOPHAGEAL DYSPHAGIA: ICD-10-CM

## 2019-06-06 DIAGNOSIS — R60.9 EDEMA, UNSPECIFIED TYPE: ICD-10-CM

## 2019-06-06 DIAGNOSIS — I10 ESSENTIAL HYPERTENSION: ICD-10-CM

## 2019-06-06 DIAGNOSIS — E03.9 ACQUIRED HYPOTHYROIDISM: ICD-10-CM

## 2019-06-06 DIAGNOSIS — E78.2 MIXED HYPERLIPIDEMIA: Primary | ICD-10-CM

## 2019-06-07 RX ORDER — ENALAPRIL MALEATE 20 MG/1
TABLET ORAL
Qty: 180 TABLET | Refills: 1 | Status: SHIPPED | OUTPATIENT
Start: 2019-06-07 | End: 2019-12-03 | Stop reason: SDUPTHER

## 2019-06-07 RX ORDER — ATORVASTATIN CALCIUM 20 MG/1
TABLET, FILM COATED ORAL
Qty: 90 TABLET | Refills: 1 | Status: SHIPPED | OUTPATIENT
Start: 2019-06-07 | End: 2019-12-03 | Stop reason: SDUPTHER

## 2019-06-07 RX ORDER — FAMOTIDINE 20 MG/1
TABLET, FILM COATED ORAL
Qty: 180 TABLET | Refills: 1 | Status: SHIPPED | OUTPATIENT
Start: 2019-06-07 | End: 2019-12-03 | Stop reason: SDUPTHER

## 2019-06-07 RX ORDER — CLONIDINE HYDROCHLORIDE 0.1 MG/1
TABLET ORAL
Qty: 180 TABLET | Refills: 1 | Status: SHIPPED | OUTPATIENT
Start: 2019-06-07 | End: 2020-01-09 | Stop reason: SDUPTHER

## 2019-06-07 RX ORDER — LEVOTHYROXINE SODIUM 0.05 MG/1
TABLET ORAL
Qty: 90 TABLET | Refills: 1 | Status: SHIPPED | OUTPATIENT
Start: 2019-06-07 | End: 2019-12-03 | Stop reason: SDUPTHER

## 2019-06-07 RX ORDER — FUROSEMIDE 20 MG/1
TABLET ORAL
Qty: 90 TABLET | Refills: 1 | Status: SHIPPED | OUTPATIENT
Start: 2019-06-07 | End: 2019-12-03 | Stop reason: SDUPTHER

## 2019-07-09 ENCOUNTER — OFFICE VISIT (OUTPATIENT)
Dept: FAMILY MEDICINE CLINIC | Age: 84
End: 2019-07-09
Payer: MEDICARE

## 2019-07-09 ENCOUNTER — HOSPITAL ENCOUNTER (OUTPATIENT)
Age: 84
Discharge: HOME | End: 2019-07-11
Payer: MEDICARE

## 2019-07-09 VITALS
OXYGEN SATURATION: 97 % | RESPIRATION RATE: 16 BRPM | HEART RATE: 60 BPM | WEIGHT: 201 LBS | DIASTOLIC BLOOD PRESSURE: 81 MMHG | HEIGHT: 63 IN | BODY MASS INDEX: 35.61 KG/M2 | SYSTOLIC BLOOD PRESSURE: 133 MMHG

## 2019-07-09 DIAGNOSIS — E03.9 ACQUIRED HYPOTHYROIDISM: ICD-10-CM

## 2019-07-09 DIAGNOSIS — R59.0 ENLARGED LYMPH NODE IN NECK: ICD-10-CM

## 2019-07-09 DIAGNOSIS — M25.50 PAIN, JOINT, MULTIPLE SITES: ICD-10-CM

## 2019-07-09 DIAGNOSIS — I25.10 CORONARY ARTERY DISEASE INVOLVING NATIVE CORONARY ARTERY OF NATIVE HEART WITHOUT ANGINA PECTORIS: ICD-10-CM

## 2019-07-09 DIAGNOSIS — I10 ESSENTIAL HYPERTENSION: ICD-10-CM

## 2019-07-09 DIAGNOSIS — E78.2 MIXED HYPERLIPIDEMIA: ICD-10-CM

## 2019-07-09 DIAGNOSIS — N18.30 CKD (CHRONIC KIDNEY DISEASE) STAGE 3, GFR 30-59 ML/MIN (HCC): ICD-10-CM

## 2019-07-09 DIAGNOSIS — I10 ESSENTIAL HYPERTENSION: Primary | ICD-10-CM

## 2019-07-09 LAB
ALBUMIN SERPL-MCNC: 4 G/DL (ref 3.5–5.2)
ALP BLD-CCNC: 131 U/L (ref 35–104)
ALT SERPL-CCNC: 18 U/L (ref 0–32)
ANION GAP SERPL CALCULATED.3IONS-SCNC: 13 MMOL/L (ref 7–16)
AST SERPL-CCNC: 22 U/L (ref 0–31)
BASOPHILS ABSOLUTE: 0.04 E9/L (ref 0–0.2)
BASOPHILS RELATIVE PERCENT: 0.7 % (ref 0–2)
BILIRUB SERPL-MCNC: 0.4 MG/DL (ref 0–1.2)
BUN BLDV-MCNC: 23 MG/DL (ref 8–23)
CALCIUM SERPL-MCNC: 10 MG/DL (ref 8.6–10.2)
CHLORIDE BLD-SCNC: 106 MMOL/L (ref 98–107)
CHOLESTEROL, TOTAL: 143 MG/DL (ref 0–199)
CO2: 27 MMOL/L (ref 22–29)
CREAT SERPL-MCNC: 1.3 MG/DL (ref 0.5–1)
EOSINOPHILS ABSOLUTE: 0.22 E9/L (ref 0.05–0.5)
EOSINOPHILS RELATIVE PERCENT: 4.1 % (ref 0–6)
GFR AFRICAN AMERICAN: 47
GFR NON-AFRICAN AMERICAN: 39 ML/MIN/1.73
GLUCOSE BLD-MCNC: 110 MG/DL (ref 74–99)
HCT VFR BLD CALC: 42.5 % (ref 34–48)
HDLC SERPL-MCNC: 59 MG/DL
HEMOGLOBIN: 13.2 G/DL (ref 11.5–15.5)
IMMATURE GRANULOCYTES #: 0.02 E9/L
IMMATURE GRANULOCYTES %: 0.4 % (ref 0–5)
LDL CHOLESTEROL CALCULATED: 68 MG/DL (ref 0–99)
LYMPHOCYTES ABSOLUTE: 1.66 E9/L (ref 1.5–4)
LYMPHOCYTES RELATIVE PERCENT: 30.9 % (ref 20–42)
MCH RBC QN AUTO: 29.7 PG (ref 26–35)
MCHC RBC AUTO-ENTMCNC: 31.1 % (ref 32–34.5)
MCV RBC AUTO: 95.5 FL (ref 80–99.9)
MONOCYTES ABSOLUTE: 0.44 E9/L (ref 0.1–0.95)
MONOCYTES RELATIVE PERCENT: 8.2 % (ref 2–12)
NEUTROPHILS ABSOLUTE: 3 E9/L (ref 1.8–7.3)
NEUTROPHILS RELATIVE PERCENT: 55.7 % (ref 43–80)
PDW BLD-RTO: 14.6 FL (ref 11.5–15)
PLATELET # BLD: 201 E9/L (ref 130–450)
PMV BLD AUTO: 11.8 FL (ref 7–12)
POTASSIUM SERPL-SCNC: 4.6 MMOL/L (ref 3.5–5)
RBC # BLD: 4.45 E12/L (ref 3.5–5.5)
SODIUM BLD-SCNC: 146 MMOL/L (ref 132–146)
TOTAL PROTEIN: 7.1 G/DL (ref 6.4–8.3)
TRIGL SERPL-MCNC: 78 MG/DL (ref 0–149)
VLDLC SERPL CALC-MCNC: 16 MG/DL
WBC # BLD: 5.4 E9/L (ref 4.5–11.5)

## 2019-07-09 PROCEDURE — 84443 ASSAY THYROID STIM HORMONE: CPT

## 2019-07-09 PROCEDURE — G8427 DOCREV CUR MEDS BY ELIG CLIN: HCPCS | Performed by: FAMILY MEDICINE

## 2019-07-09 PROCEDURE — G8400 PT W/DXA NO RESULTS DOC: HCPCS | Performed by: FAMILY MEDICINE

## 2019-07-09 PROCEDURE — G8417 CALC BMI ABV UP PARAM F/U: HCPCS | Performed by: FAMILY MEDICINE

## 2019-07-09 PROCEDURE — 4040F PNEUMOC VAC/ADMIN/RCVD: CPT | Performed by: FAMILY MEDICINE

## 2019-07-09 PROCEDURE — 80053 COMPREHEN METABOLIC PANEL: CPT

## 2019-07-09 PROCEDURE — 85025 COMPLETE CBC W/AUTO DIFF WBC: CPT

## 2019-07-09 PROCEDURE — 1090F PRES/ABSN URINE INCON ASSESS: CPT | Performed by: FAMILY MEDICINE

## 2019-07-09 PROCEDURE — 1123F ACP DISCUSS/DSCN MKR DOCD: CPT | Performed by: FAMILY MEDICINE

## 2019-07-09 PROCEDURE — G8598 ASA/ANTIPLAT THER USED: HCPCS | Performed by: FAMILY MEDICINE

## 2019-07-09 PROCEDURE — 36415 COLL VENOUS BLD VENIPUNCTURE: CPT | Performed by: FAMILY MEDICINE

## 2019-07-09 PROCEDURE — 99214 OFFICE O/P EST MOD 30 MIN: CPT | Performed by: FAMILY MEDICINE

## 2019-07-09 PROCEDURE — 80061 LIPID PANEL: CPT

## 2019-07-09 PROCEDURE — 1036F TOBACCO NON-USER: CPT | Performed by: FAMILY MEDICINE

## 2019-07-09 ASSESSMENT — ENCOUNTER SYMPTOMS
WHEEZING: 0
ABDOMINAL PAIN: 0
BLOOD IN STOOL: 0
TROUBLE SWALLOWING: 0
SHORTNESS OF BREATH: 0

## 2019-07-09 NOTE — PROGRESS NOTES
unremarkable   ENT:  TM's intact bilaterally, no effusion   Nasal:  No mucosal edema     No nasal drainage   Oral:  mucosa moist and pink             no posterior pharyngeal drainage     no posterior pharyngeal exudate  Neck:  Supple   No palpable cervical lymphoadenopathy   Thyroid without mass or enlargement  Resp: Lungs CTAB   Equal and adequate air exchange without accessory muscle use   No rales, rhonchi or wheeze  CV: S1S2 RRR no ectopy   No murmur   Intact distal pulses   Tr LLE edema  GI: Abdomen Soft, non tender, non distended   Normoactive bowel sounds  MS: Physiologic ROM of all extremities    Intact distal pulses   No clubbing or cyanosis   Skin Warm and dry; no rash or lesion  Neuro: Alert and oriented; normal and intact dtr's   Psych: Euthymic mood, congruent affect       Assessment (including specific orders/meds/labs):      Virgilio Douglass was seen today for check-up and hypertension. Diagnoses and all orders for this visit:    Essential hypertension  -     Comprehensive Metabolic Panel; Future  -     CBC Auto Differential; Future  -     Lipid Panel; Future    Coronary artery disease involving native coronary artery of native heart without angina pectoris  -     Lipid Panel; Future    Mixed hyperlipidemia  -     Comprehensive Metabolic Panel; Future  -     CBC Auto Differential; Future  -     Lipid Panel; Future    Acquired hypothyroidism  -     TSH without Reflex; Future    CKD (chronic kidney disease) stage 3, GFR 30-59 ml/min (HCC)    Pain, joint, multiple sites    Enlarged lymph node in neck  -     Sadi Fernandez MD, [facility] ([facility])        Plan:     Blood pressure is controlled. Logs reviewed from home also show control. Suggest that she discuss her cardiac issues with her cardiologist.  As these have been stable for the last 3 weeks I did not repeat an EKG. If they recur she should return to the ER. Otherwise see cardio on Friday as scheduled  Check status of lipids and thyroid today. They have been reasonably well-controlled in the past.  For now no changes planned medications  Renal failure issues have improved. She will continue on current medications  She declines work-up of the arthritis symptoms but does request referral to ENT. I reviewed with her the work-up that thus far has been benign. She declines pursuit of obstructive sleep apnea symptoms. Counseled regarding above diagnosis, including possible risks and complications,  especially if left uncontrolled. Counseled regarding the possible sideeffects, risks, benefits and alternatives to treatment; patient and/or guardian verbalizes understanding, agrees, feels comfortable with and wishes to proceed with above treatment plan. Call or go to ED immediately if symptoms worsen or persist. Advised patient to call with any new medication issues, and, as applicable, read all Rx info from pharmacy to assure aware ofall possible risks and side effects of medication before taking. As applicable, educational materials and/or home exercises printed forpatient's review and were included in patient instructions on his/her After Visit Summary and given to patient at the end of visit. Patient and/or guardian given opportunity to ask questions/raise concerns. She verbalized comfort and understanding of instructions. Follow Up:     Return in about 6 months (around 1/9/2020), or if symptoms worsen or fail to improve, for Follow-up chronic issues. or sooner if the above issues change unexpectedly or new issues develop     This document was prepared at least partially through the use ofvoice recognition software. Although effort is taken to assure the accuracy of this document, it is possible that grammatical, syntax,  or spelling errors may occur.       Electronically signed by Lawrence Jean MD [facility]

## 2019-07-10 LAB — TSH SERPL DL<=0.05 MIU/L-ACNC: 2.27 UIU/ML (ref 0.27–4.2)

## 2019-07-12 ENCOUNTER — OFFICE VISIT (OUTPATIENT)
Dept: CARDIOLOGY CLINIC | Age: 84
End: 2019-07-12
Payer: MEDICARE

## 2019-07-12 VITALS
RESPIRATION RATE: 12 BRPM | SYSTOLIC BLOOD PRESSURE: 110 MMHG | HEART RATE: 48 BPM | BODY MASS INDEX: 35.61 KG/M2 | HEIGHT: 63 IN | WEIGHT: 201 LBS | DIASTOLIC BLOOD PRESSURE: 70 MMHG

## 2019-07-12 DIAGNOSIS — I25.10 CORONARY ARTERY DISEASE INVOLVING NATIVE CORONARY ARTERY OF NATIVE HEART WITHOUT ANGINA PECTORIS: Primary | ICD-10-CM

## 2019-07-12 DIAGNOSIS — R00.1 BRADYCARDIA: ICD-10-CM

## 2019-07-12 DIAGNOSIS — Z95.1 HX OF CABG: ICD-10-CM

## 2019-07-12 DIAGNOSIS — I10 ESSENTIAL HYPERTENSION: ICD-10-CM

## 2019-07-12 DIAGNOSIS — E78.2 MIXED HYPERLIPIDEMIA: ICD-10-CM

## 2019-07-12 PROCEDURE — G8427 DOCREV CUR MEDS BY ELIG CLIN: HCPCS | Performed by: INTERNAL MEDICINE

## 2019-07-12 PROCEDURE — G8417 CALC BMI ABV UP PARAM F/U: HCPCS | Performed by: INTERNAL MEDICINE

## 2019-07-12 PROCEDURE — G8598 ASA/ANTIPLAT THER USED: HCPCS | Performed by: INTERNAL MEDICINE

## 2019-07-12 PROCEDURE — G8400 PT W/DXA NO RESULTS DOC: HCPCS | Performed by: INTERNAL MEDICINE

## 2019-07-12 PROCEDURE — 4040F PNEUMOC VAC/ADMIN/RCVD: CPT | Performed by: INTERNAL MEDICINE

## 2019-07-12 PROCEDURE — 99214 OFFICE O/P EST MOD 30 MIN: CPT | Performed by: INTERNAL MEDICINE

## 2019-07-12 PROCEDURE — 1036F TOBACCO NON-USER: CPT | Performed by: INTERNAL MEDICINE

## 2019-07-12 PROCEDURE — 1123F ACP DISCUSS/DSCN MKR DOCD: CPT | Performed by: INTERNAL MEDICINE

## 2019-07-12 PROCEDURE — 93000 ELECTROCARDIOGRAM COMPLETE: CPT | Performed by: INTERNAL MEDICINE

## 2019-07-12 PROCEDURE — 1090F PRES/ABSN URINE INCON ASSESS: CPT | Performed by: INTERNAL MEDICINE

## 2019-07-12 NOTE — PROGRESS NOTES
OFFICE FOLLOW-UP    Name: Alyssia Daily    Age: 82 y.o. Date of Service: 7/12/2019    Chief Complaint: Follow-up for CAD s/p CABG in 2007, chest pain    Referring Physician: Dr. Skip Melendez    History of Present Illness:   Evaluated at [facility] on 1/9/18 for complaints of chest pain --> patient experienced subscapular pain/jaw pain that AM (lasted \"about 30 seconds\"). She reported a 2-3 month history of occasional chest pain at that time (left-sided, nonexertional, episodes last \"seconds/minute\"). Negative Lexiscan nuclear stress test on 1/9/18.     ~ 1 episode of subscapular/jaw pain every 1-2 months. Currently with no active cardiac complaints at rest. BP today 110/70. SB on EKG.     Review of Systems:  Cardiac: As per HPI  General: No fever, chills  Pulmonary: As per HPI  HEENT: No visual disturbances, difficult swallowing  GI: No nausea, vomiting, abdominal pain  Endocrine: +hyopthyroidism, no DM  Musculoskeletal: CHASE x 4, no focal motor deficits  Skin: Intact, no rashes  Neuro/Psych: No headache or seizures    Past Medical History:  Past Medical History:   Diagnosis Date    Arthritis     Basal cell cancer 1/2013    nose; Dr Alice Enamorado Coronary artery disease     bypass 2007; follows with Dr Michelle Waller yearly    Diastolic dysfunction 2303    stage 3; follows with Dr. Michelle Waller yearly    Dysphagia     Hyperlipidemia     Hypertension     Hypothyroid     Impaired glucose tolerance     Liver cyst     Lumbosacral radiculopathy at S1 12/2015    left sided    Macular degeneration 6/13    Renal cyst     seen by Dr Celso Deras; benign    Vertigo 2/2015       Past Surgical History:  Past Surgical History:   Procedure Laterality Date    APPENDECTOMY      CARDIAC SURGERY  2007    double bypass @ [facility] (Dr. Carolee Benson)   [address]      right    COLONOSCOPY  2007    Dr Renetta Urena      partial (ovaries remain)     Family History:  Family History   Problem Relation Age of Onset    Heart Disease TWICE A  tablet 1    famotidine (PEPCID) 20 MG tablet TAKE 1 TABLET TWICE A  tablet 1    furosemide (LASIX) 20 MG tablet TAKE 1 TABLET DAILY 90 tablet 1    levothyroxine (SYNTHROID) 50 MCG tablet TAKE 1 TABLET DAILY 90 tablet 1    cloNIDine (CATAPRES) 0.1 MG tablet TAKE 1 TABLET TWICE A  tablet 1    NIFEdipine (PROCARDIA XL) 30 MG extended release tablet TAKE ONE TABLET BY MOUTH EVERY DAY  0    busPIRone (BUSPAR) 5 MG tablet TAKE 1 TABLET DAILY AS     NEEDED 90 tablet 3    guanFACINE (TENEX) 1 MG tablet       Multiple Vitamins-Minerals (PRESERVISION AREDS) TABS Take by mouth 2 times daily      nitroGLYCERIN (NITROSTAT) 0.4 MG SL tablet Place 1 tablet under the tongue every 5 minutes as needed for Chest pain 25 tablet 3    calcium carbonate (CALCIUM 600) 600 MG TABS tablet Take 1 tablet by mouth 2 times daily      Biotin 81409 MCG TABS Take 1 tablet by mouth every morning       Calcium Polycarbophil (FIBER) 625 MG TABS Take 2 tablets by mouth every morning       vitamin B-12 (CYANOCOBALAMIN) 1000 MCG tablet Take 2,500 mcg by mouth every morning Instructed to hold 5 days pre-op 30 tablet 5    fluticasone (FLONASE) 50 MCG/ACT nasal spray 1 spray by Each Nare route daily 1 Spray in each nostril 1 Bottle 2     No current facility-administered medications for this visit. Physical Exam:  /70   Pulse (!) 48   Resp 12   Ht 5' 3\" (1.6 m)   Wt 201 lb (91.2 kg)   LMP  (LMP Unknown)   BMI 35.61 kg/m²   Wt Readings from Last 3 Encounters:   07/12/19 201 lb (91.2 kg)   07/09/19 201 lb (91.2 kg)   04/11/19 201 lb (91.2 kg)     Appearance: Awake, alert, no acute respiratory distress  Skin: Intact, no rash  Head: Normocephalic, atraumatic  Eyes: EOMI, no conjunctival erythema  ENMT: No pharyngeal erythema, MMM, no rhinorrhea  Neck: Supple, no elevated JVP, no carotid bruits  Lungs: Clear to auscultation bilaterally. No wheezes, rales, or rhonchi.   Cardiac: Bradycardic, regular rhythm, +S1S2, no murmurs apparent  Abdomen: Soft, nontender, +bowel sounds  Extremities: Moves all extremities x 4, 1+ lower extremity edema  Neurologic: No focal motor deficits apparent, normal mood and affect    Laboratory Tests:  Lab Results   Component Value Date    CREATININE 1.3 (H) 07/09/2019    BUN 23 07/09/2019     07/09/2019    K 4.6 07/09/2019     07/09/2019    CO2 27 07/09/2019     Lab Results   Component Value Date     (H) 04/15/2012     Lab Results   Component Value Date    WBC 5.4 07/09/2019    RBC 4.45 07/09/2019    HGB 13.2 07/09/2019    HCT 42.5 07/09/2019    MCV 95.5 07/09/2019    MCH 29.7 07/09/2019    MCHC 31.1 07/09/2019    RDW 14.6 07/09/2019     07/09/2019    MPV 11.8 07/09/2019     Lab Results   Component Value Date    MG 2.0 02/01/2018     Lab Results   Component Value Date    PROTIME 11.1 03/07/2016    PROTIME 11.1 04/15/2012    INR 1.0 03/07/2016     Lab Results   Component Value Date    TSH 2.270 07/09/2019     Lab Results   Component Value Date    TRIG 78 07/09/2019    TRIG 76 11/15/2018    TRIG 90 07/10/2017     Lab Results   Component Value Date    HDL 59 07/09/2019    HDL 69 11/15/2018    HDL 72 07/10/2017     Lab Results   Component Value Date    LDLCALC 68 07/09/2019    LDLCALC 55 11/15/2018    LDLCALC 58 07/10/2017     Lab Results   Component Value Date    MG 2.0 02/01/2018     Cardiac Tests:  ECG: SB, rate 48, NSSTT changes    February 2015 echocardiogram: EF 60%, normal RV function, stage 2 DD, mild valve disease    Echocardiogram: 3/8/16 (read by Dr. Angel Schreiber)  Normal left ventricle size and systolic function  Ejection fraction is visually estimated at 65%. Mild left ventricular concentric hypertrophy noted. The left atrium is mildly dilated. Mild mitral regurgitation is present. Mild tricuspid regurgitation. Vishal Miller nuclear stress test: 3/8/16  1. No evidence of pharmacologic stress-induced myocardial   ischemia.    2. No focal wall motion

## 2019-09-04 ENCOUNTER — APPOINTMENT (OUTPATIENT)
Dept: GENERAL RADIOLOGY | Age: 84
End: 2019-09-04
Payer: MEDICARE

## 2019-09-04 ENCOUNTER — HOSPITAL ENCOUNTER (OUTPATIENT)
Age: 84
Setting detail: OBSERVATION
Discharge: HOME | End: 2019-09-05
Attending: EMERGENCY MEDICINE | Admitting: FAMILY MEDICINE
Payer: MEDICARE

## 2019-09-04 DIAGNOSIS — R07.9 CHEST PAIN, UNSPECIFIED TYPE: Primary | ICD-10-CM

## 2019-09-04 LAB
ALBUMIN SERPL-MCNC: 4.1 G/DL (ref 3.5–5.2)
ALP BLD-CCNC: 138 U/L (ref 35–104)
ALT SERPL-CCNC: 16 U/L (ref 0–32)
ANION GAP SERPL CALCULATED.3IONS-SCNC: 14 MMOL/L (ref 7–16)
AST SERPL-CCNC: 17 U/L (ref 0–31)
BASOPHILS ABSOLUTE: 0.05 E9/L (ref 0–0.2)
BASOPHILS RELATIVE PERCENT: 0.7 % (ref 0–2)
BILIRUB SERPL-MCNC: 0.3 MG/DL (ref 0–1.2)
BUN BLDV-MCNC: 26 MG/DL (ref 8–23)
CALCIUM SERPL-MCNC: 9.7 MG/DL (ref 8.6–10.2)
CHLORIDE BLD-SCNC: 105 MMOL/L (ref 98–107)
CO2: 25 MMOL/L (ref 22–29)
CREAT SERPL-MCNC: 1.3 MG/DL (ref 0.5–1)
EOSINOPHILS ABSOLUTE: 0.23 E9/L (ref 0.05–0.5)
EOSINOPHILS RELATIVE PERCENT: 3.2 % (ref 0–6)
GFR AFRICAN AMERICAN: 47
GFR NON-AFRICAN AMERICAN: 39 ML/MIN/1.73
GLUCOSE BLD-MCNC: 151 MG/DL (ref 74–99)
HCT VFR BLD CALC: 42.8 % (ref 34–48)
HEMOGLOBIN: 13.6 G/DL (ref 11.5–15.5)
IMMATURE GRANULOCYTES #: 0.02 E9/L
IMMATURE GRANULOCYTES %: 0.3 % (ref 0–5)
LIPASE: 39 U/L (ref 13–60)
LYMPHOCYTES ABSOLUTE: 2.07 E9/L (ref 1.5–4)
LYMPHOCYTES RELATIVE PERCENT: 29.2 % (ref 20–42)
MCH RBC QN AUTO: 29.6 PG (ref 26–35)
MCHC RBC AUTO-ENTMCNC: 31.8 % (ref 32–34.5)
MCV RBC AUTO: 93.2 FL (ref 80–99.9)
MONOCYTES ABSOLUTE: 0.52 E9/L (ref 0.1–0.95)
MONOCYTES RELATIVE PERCENT: 7.3 % (ref 2–12)
NEUTROPHILS ABSOLUTE: 4.2 E9/L (ref 1.8–7.3)
NEUTROPHILS RELATIVE PERCENT: 59.3 % (ref 43–80)
PDW BLD-RTO: 14.2 FL (ref 11.5–15)
PLATELET # BLD: 221 E9/L (ref 130–450)
PMV BLD AUTO: 10.8 FL (ref 7–12)
POTASSIUM SERPL-SCNC: 3.8 MMOL/L (ref 3.5–5)
RBC # BLD: 4.59 E12/L (ref 3.5–5.5)
SODIUM BLD-SCNC: 144 MMOL/L (ref 132–146)
TOTAL PROTEIN: 7.1 G/DL (ref 6.4–8.3)
TROPONIN: <0.01 NG/ML (ref 0–0.03)
WBC # BLD: 7.1 E9/L (ref 4.5–11.5)

## 2019-09-04 PROCEDURE — 84484 ASSAY OF TROPONIN QUANT: CPT

## 2019-09-04 PROCEDURE — 93005 ELECTROCARDIOGRAM TRACING: CPT | Performed by: EMERGENCY MEDICINE

## 2019-09-04 PROCEDURE — 99285 EMERGENCY DEPT VISIT HI MDM: CPT

## 2019-09-04 PROCEDURE — 85025 COMPLETE CBC W/AUTO DIFF WBC: CPT

## 2019-09-04 PROCEDURE — 71045 X-RAY EXAM CHEST 1 VIEW: CPT

## 2019-09-04 PROCEDURE — 36415 COLL VENOUS BLD VENIPUNCTURE: CPT

## 2019-09-04 PROCEDURE — 80053 COMPREHEN METABOLIC PANEL: CPT

## 2019-09-04 PROCEDURE — 83690 ASSAY OF LIPASE: CPT

## 2019-09-04 ASSESSMENT — ENCOUNTER SYMPTOMS
TROUBLE SWALLOWING: 0
HEARTBURN: 0
VOMITING: 0
NAUSEA: 0
COUGH: 0
EYES NEGATIVE: 1
ABDOMINAL PAIN: 0
ORTHOPNEA: 0
SHORTNESS OF BREATH: 0
GASTROINTESTINAL NEGATIVE: 1
BACK PAIN: 0

## 2019-09-04 ASSESSMENT — PAIN DESCRIPTION - ONSET: ONSET: GRADUAL

## 2019-09-04 ASSESSMENT — PAIN SCALES - GENERAL: PAINLEVEL_OUTOF10: 5

## 2019-09-04 ASSESSMENT — PAIN DESCRIPTION - PAIN TYPE: TYPE: ACUTE PAIN

## 2019-09-04 ASSESSMENT — PAIN DESCRIPTION - DESCRIPTORS: DESCRIPTORS: BURNING

## 2019-09-04 ASSESSMENT — PAIN DESCRIPTION - LOCATION: LOCATION: CHEST

## 2019-09-04 ASSESSMENT — PAIN DESCRIPTION - PROGRESSION: CLINICAL_PROGRESSION: GRADUALLY WORSENING

## 2019-09-04 ASSESSMENT — PAIN DESCRIPTION - ORIENTATION: ORIENTATION: LEFT;RIGHT;MID

## 2019-09-04 ASSESSMENT — PAIN DESCRIPTION - FREQUENCY: FREQUENCY: CONTINUOUS

## 2019-09-05 VITALS
SYSTOLIC BLOOD PRESSURE: 152 MMHG | RESPIRATION RATE: 18 BRPM | HEIGHT: 63 IN | DIASTOLIC BLOOD PRESSURE: 67 MMHG | TEMPERATURE: 97.4 F | OXYGEN SATURATION: 97 % | WEIGHT: 200 LBS | HEART RATE: 61 BPM | BODY MASS INDEX: 35.44 KG/M2

## 2019-09-05 PROBLEM — R07.9 CHEST PAIN: Status: RESOLVED | Noted: 2019-09-04 | Resolved: 2019-09-05

## 2019-09-05 LAB
ANION GAP SERPL CALCULATED.3IONS-SCNC: 11 MMOL/L (ref 7–16)
BUN BLDV-MCNC: 25 MG/DL (ref 8–23)
CALCIUM SERPL-MCNC: 9.3 MG/DL (ref 8.6–10.2)
CHLORIDE BLD-SCNC: 111 MMOL/L (ref 98–107)
CK MB: 1.5 NG/ML (ref 0–4.3)
CK MB: 1.5 NG/ML (ref 0–4.3)
CO2: 24 MMOL/L (ref 22–29)
CREAT SERPL-MCNC: 1.2 MG/DL (ref 0.5–1)
EKG ATRIAL RATE: 72 BPM
EKG P AXIS: 80 DEGREES
EKG P-R INTERVAL: 158 MS
EKG Q-T INTERVAL: 404 MS
EKG QRS DURATION: 80 MS
EKG QTC CALCULATION (BAZETT): 442 MS
EKG R AXIS: 10 DEGREES
EKG T AXIS: 106 DEGREES
EKG VENTRICULAR RATE: 72 BPM
GFR AFRICAN AMERICAN: 52
GFR NON-AFRICAN AMERICAN: 43 ML/MIN/1.73
GLUCOSE BLD-MCNC: 106 MG/DL (ref 74–99)
HCT VFR BLD CALC: 38.3 % (ref 34–48)
HEMOGLOBIN: 12.2 G/DL (ref 11.5–15.5)
MAGNESIUM: 2 MG/DL (ref 1.6–2.6)
MCH RBC QN AUTO: 29.5 PG (ref 26–35)
MCHC RBC AUTO-ENTMCNC: 31.9 % (ref 32–34.5)
MCV RBC AUTO: 92.7 FL (ref 80–99.9)
PDW BLD-RTO: 14.1 FL (ref 11.5–15)
PLATELET # BLD: 195 E9/L (ref 130–450)
PMV BLD AUTO: 11 FL (ref 7–12)
POTASSIUM REFLEX MAGNESIUM: 3.7 MMOL/L (ref 3.5–5)
RBC # BLD: 4.13 E12/L (ref 3.5–5.5)
SODIUM BLD-SCNC: 146 MMOL/L (ref 132–146)
TROPONIN: <0.01 NG/ML (ref 0–0.03)
TROPONIN: <0.01 NG/ML (ref 0–0.03)
WBC # BLD: 6.6 E9/L (ref 4.5–11.5)

## 2019-09-05 PROCEDURE — 36415 COLL VENOUS BLD VENIPUNCTURE: CPT

## 2019-09-05 PROCEDURE — 83735 ASSAY OF MAGNESIUM: CPT

## 2019-09-05 PROCEDURE — 6360000002 HC RX W HCPCS: Performed by: FAMILY MEDICINE

## 2019-09-05 PROCEDURE — 85027 COMPLETE CBC AUTOMATED: CPT

## 2019-09-05 PROCEDURE — G0378 HOSPITAL OBSERVATION PER HR: HCPCS

## 2019-09-05 PROCEDURE — 93010 ELECTROCARDIOGRAM REPORT: CPT | Performed by: INTERNAL MEDICINE

## 2019-09-05 PROCEDURE — 6370000000 HC RX 637 (ALT 250 FOR IP): Performed by: FAMILY MEDICINE

## 2019-09-05 PROCEDURE — 84484 ASSAY OF TROPONIN QUANT: CPT

## 2019-09-05 PROCEDURE — 93005 ELECTROCARDIOGRAM TRACING: CPT | Performed by: FAMILY MEDICINE

## 2019-09-05 PROCEDURE — 99214 OFFICE O/P EST MOD 30 MIN: CPT | Performed by: INTERNAL MEDICINE

## 2019-09-05 PROCEDURE — 96372 THER/PROPH/DIAG INJ SC/IM: CPT

## 2019-09-05 PROCEDURE — 82553 CREATINE MB FRACTION: CPT

## 2019-09-05 PROCEDURE — 80048 BASIC METABOLIC PNL TOTAL CA: CPT

## 2019-09-05 PROCEDURE — 99219 PR INITIAL OBSERVATION CARE/DAY 50 MINUTES: CPT | Performed by: FAMILY MEDICINE

## 2019-09-05 PROCEDURE — 2580000003 HC RX 258: Performed by: FAMILY MEDICINE

## 2019-09-05 RX ORDER — ONDANSETRON 2 MG/ML
4 INJECTION INTRAMUSCULAR; INTRAVENOUS EVERY 6 HOURS PRN
Status: DISCONTINUED | OUTPATIENT
Start: 2019-09-05 | End: 2019-09-05 | Stop reason: HOSPADM

## 2019-09-05 RX ORDER — NITROGLYCERIN 0.4 MG/1
0.4 TABLET SUBLINGUAL EVERY 5 MIN PRN
Status: DISCONTINUED | OUTPATIENT
Start: 2019-09-05 | End: 2019-09-05 | Stop reason: HOSPADM

## 2019-09-05 RX ORDER — LEVOTHYROXINE SODIUM 0.05 MG/1
50 TABLET ORAL DAILY
Status: DISCONTINUED | OUTPATIENT
Start: 2019-09-05 | End: 2019-09-05 | Stop reason: HOSPADM

## 2019-09-05 RX ORDER — ENALAPRIL MALEATE 10 MG/1
20 TABLET ORAL 2 TIMES DAILY
Status: DISCONTINUED | OUTPATIENT
Start: 2019-09-05 | End: 2019-09-05 | Stop reason: HOSPADM

## 2019-09-05 RX ORDER — SODIUM CHLORIDE 0.9 % (FLUSH) 0.9 %
10 SYRINGE (ML) INJECTION EVERY 12 HOURS SCHEDULED
Status: DISCONTINUED | OUTPATIENT
Start: 2019-09-05 | End: 2019-09-05 | Stop reason: HOSPADM

## 2019-09-05 RX ORDER — ACETAMINOPHEN 325 MG/1
650 TABLET ORAL EVERY 4 HOURS PRN
Status: DISCONTINUED | OUTPATIENT
Start: 2019-09-05 | End: 2019-09-05 | Stop reason: HOSPADM

## 2019-09-05 RX ORDER — ATORVASTATIN CALCIUM 20 MG/1
20 TABLET, FILM COATED ORAL NIGHTLY
Status: DISCONTINUED | OUTPATIENT
Start: 2019-09-05 | End: 2019-09-05 | Stop reason: HOSPADM

## 2019-09-05 RX ORDER — BUSPIRONE HYDROCHLORIDE 5 MG/1
5 TABLET ORAL DAILY
Status: DISCONTINUED | OUTPATIENT
Start: 2019-09-05 | End: 2019-09-05 | Stop reason: HOSPADM

## 2019-09-05 RX ORDER — FUROSEMIDE 20 MG/1
20 TABLET ORAL DAILY
Status: DISCONTINUED | OUTPATIENT
Start: 2019-09-05 | End: 2019-09-05 | Stop reason: HOSPADM

## 2019-09-05 RX ORDER — SODIUM CHLORIDE 0.9 % (FLUSH) 0.9 %
10 SYRINGE (ML) INJECTION PRN
Status: DISCONTINUED | OUTPATIENT
Start: 2019-09-05 | End: 2019-09-05 | Stop reason: HOSPADM

## 2019-09-05 RX ORDER — GABAPENTIN 100 MG/1
100 CAPSULE ORAL 2 TIMES DAILY
Status: DISCONTINUED | OUTPATIENT
Start: 2019-09-05 | End: 2019-09-05 | Stop reason: HOSPADM

## 2019-09-05 RX ORDER — GUANFACINE 1 MG/1
1 TABLET ORAL NIGHTLY
Status: DISCONTINUED | OUTPATIENT
Start: 2019-09-05 | End: 2019-09-05 | Stop reason: HOSPADM

## 2019-09-05 RX ORDER — CLONIDINE HYDROCHLORIDE 0.1 MG/1
0.1 TABLET ORAL 2 TIMES DAILY
Status: DISCONTINUED | OUTPATIENT
Start: 2019-09-05 | End: 2019-09-05 | Stop reason: HOSPADM

## 2019-09-05 RX ORDER — NIFEDIPINE 30 MG/1
30 TABLET, EXTENDED RELEASE ORAL DAILY
Status: DISCONTINUED | OUTPATIENT
Start: 2019-09-05 | End: 2019-09-05 | Stop reason: HOSPADM

## 2019-09-05 RX ORDER — ASPIRIN 81 MG/1
81 TABLET ORAL NIGHTLY
Status: DISCONTINUED | OUTPATIENT
Start: 2019-09-05 | End: 2019-09-05 | Stop reason: HOSPADM

## 2019-09-05 RX ORDER — OXYBUTYNIN CHLORIDE 5 MG/1
5 TABLET, EXTENDED RELEASE ORAL EVERY MORNING
Status: DISCONTINUED | OUTPATIENT
Start: 2019-09-05 | End: 2019-09-05 | Stop reason: HOSPADM

## 2019-09-05 RX ADMIN — CLONIDINE HYDROCHLORIDE 0.1 MG: 0.1 TABLET ORAL at 10:05

## 2019-09-05 RX ADMIN — OXYBUTYNIN CHLORIDE 5 MG: 5 TABLET, EXTENDED RELEASE ORAL at 10:05

## 2019-09-05 RX ADMIN — BUSPIRONE HYDROCHLORIDE 5 MG: 5 TABLET ORAL at 10:04

## 2019-09-05 RX ADMIN — GABAPENTIN 100 MG: 100 CAPSULE ORAL at 10:05

## 2019-09-05 RX ADMIN — ASPIRIN 325 MG: 325 TABLET, DELAYED RELEASE ORAL at 02:18

## 2019-09-05 RX ADMIN — ENOXAPARIN SODIUM 40 MG: 40 INJECTION SUBCUTANEOUS at 10:05

## 2019-09-05 RX ADMIN — FUROSEMIDE 20 MG: 20 TABLET ORAL at 10:05

## 2019-09-05 RX ADMIN — ATORVASTATIN CALCIUM 20 MG: 20 TABLET, FILM COATED ORAL at 02:18

## 2019-09-05 RX ADMIN — Medication 10 ML: at 10:05

## 2019-09-05 RX ADMIN — ENALAPRIL MALEATE 20 MG: 10 TABLET ORAL at 10:04

## 2019-09-05 RX ADMIN — ENALAPRIL MALEATE 20 MG: 10 TABLET ORAL at 02:18

## 2019-09-05 RX ADMIN — GABAPENTIN 100 MG: 100 CAPSULE ORAL at 02:18

## 2019-09-05 RX ADMIN — NIFEDIPINE 30 MG: 30 TABLET, EXTENDED RELEASE ORAL at 10:04

## 2019-09-05 RX ADMIN — CLONIDINE HYDROCHLORIDE 0.1 MG: 0.1 TABLET ORAL at 02:17

## 2019-09-05 ASSESSMENT — PAIN SCALES - GENERAL
PAINLEVEL_OUTOF10: 0
PAINLEVEL_OUTOF10: 0

## 2019-09-05 NOTE — PROGRESS NOTES
[facility]    Subjective:    Patient  stated that she does not have any chest pain right now and she denied any palpitation too. She does not have shortness of breath, cough, or fever. she slept well last night . Denies abdominal pain. Tolerating diet. No nausea or vomiting. Past medical, surgical, family and social history were reviewed, non-contributory, and unchanged unless otherwise stated. Objective:  Patient is awake alert well oriented to time place and person, not in acute distress  BP (!) 148/67   Pulse 61   Temp 97.9 °F (36.6 °C) (Oral)   Resp 16   Ht 5' 3\" (1.6 m)   Wt 200 lb (90.7 kg)   LMP  (LMP Unknown)   SpO2 98%   BMI 35.43 kg/m²     Heart:  RRR, no murmurs, gallops, or rubs.   Lungs:  CTA bilaterally, no wheeze, rales or rhonchi  Abd: bowel sounds present, nontender, nondistended, no masses  Extrem:  No clubbing, cyanosis, or minimal pedal edema    Recent Results (from the past 24 hour(s))   EKG 12 Lead    Collection Time: 09/04/19  7:44 PM   Result Value Ref Range    Ventricular Rate 72 BPM    Atrial Rate 72 BPM    P-R Interval 158 ms    QRS Duration 80 ms    Q-T Interval 404 ms    QTc Calculation (Bazett) 442 ms    P Axis 80 degrees    R Axis 10 degrees    T Axis 106 degrees   Troponin    Collection Time: 09/04/19  7:47 PM   Result Value Ref Range    Troponin <0.01 0.00 - 0.03 ng/mL   Comprehensive metabolic panel    Collection Time: 09/04/19  7:47 PM   Result Value Ref Range    Sodium 144 132 - 146 mmol/L    Potassium 3.8 3.5 - 5.0 mmol/L    Chloride 105 98 - 107 mmol/L    CO2 25 22 - 29 mmol/L    Anion Gap 14 7 - 16 mmol/L    Glucose 151 (H) 74 - 99 mg/dL    BUN 26 (H) 8 - 23 mg/dL    CREATININE 1.3 (H) 0.5 - 1.0 mg/dL    GFR Non-African American 39 >=60 mL/min/1.73    GFR African American 47     Calcium 9.7 8.6 - 10.2 mg/dL    Total Protein 7.1 6.4 - 8.3 g/dL    Alb 4.1 3.5 - 5.2 g/dL    Total Bilirubin 0.3 0.0 - 1.2 mg/dL

## 2019-09-05 NOTE — PLAN OF CARE
Problem: Pain:  Description  Pain management should include both nonpharmacologic and pharmacologic interventions.   Goal: Pain level will decrease  Description  Pain level will decrease  Outcome: Met This Shift  Goal: Control of acute pain  Description  Control of acute pain  Outcome: Met This Shift  Goal: Control of chronic pain  Description  Control of chronic pain  Outcome: Met This Shift

## 2019-09-05 NOTE — DISCHARGE SUMMARY
as needed for Chest pain  Qty: 25 tablet, Refills: 3      calcium carbonate (CALCIUM 600) 600 MG TABS tablet Take 1 tablet by mouth 2 times daily      Biotin 33673 MCG TABS Take 1 tablet by mouth every morning       Calcium Polycarbophil (FIBER) 625 MG TABS Take 2 tablets by mouth every morning       vitamin B-12 (CYANOCOBALAMIN) 1000 MCG tablet Take 2,500 mcg by mouth every morning Instructed to hold 5 days pre-op  Qty: 30 tablet, Refills: 5            Current Discharge Medication List        Current Discharge Medication List      CONTINUE these medications which have NOT CHANGED    Details   aspirin 81 MG tablet Take 1 tablet by mouth nightly       oxybutynin (DITROPAN-XL) 5 MG extended release tablet TAKE 1 TABLET EVERY MORNING  Qty: 90 tablet, Refills: 3      gabapentin (NEURONTIN) 100 MG capsule TAKE 1 CAPSULE TWICE DAILY.   Qty: 180 capsule, Refills: 1    Associated Diagnoses: Lumbosacral radiculopathy at S1      atorvastatin (LIPITOR) 20 MG tablet TAKE 1 TABLET DAILY  Qty: 90 tablet, Refills: 1    Associated Diagnoses: Mixed hyperlipidemia      enalapril (VASOTEC) 20 MG tablet TAKE 1 TABLET TWICE A DAY  Qty: 180 tablet, Refills: 1    Associated Diagnoses: Essential hypertension      famotidine (PEPCID) 20 MG tablet TAKE 1 TABLET TWICE A DAY  Qty: 180 tablet, Refills: 1    Associated Diagnoses: Pharyngoesophageal dysphagia      furosemide (LASIX) 20 MG tablet TAKE 1 TABLET DAILY  Qty: 90 tablet, Refills: 1    Associated Diagnoses: Edema, unspecified type      levothyroxine (SYNTHROID) 50 MCG tablet TAKE 1 TABLET DAILY  Qty: 90 tablet, Refills: 1    Associated Diagnoses: Acquired hypothyroidism      cloNIDine (CATAPRES) 0.1 MG tablet TAKE 1 TABLET TWICE A DAY  Qty: 180 tablet, Refills: 1    Associated Diagnoses: Essential hypertension      NIFEdipine (PROCARDIA XL) 30 MG extended release tablet TAKE ONE TABLET BY MOUTH EVERY DAY  Refills: 0      busPIRone (BUSPAR) 5 MG tablet TAKE 1 TABLET DAILY AS

## 2019-09-05 NOTE — CONSULTS
(91.2 kg)     Appearance: Awake, alert, no acute respiratory distress  Skin: Intact, no rash  Head: Normocephalic, atraumatic  Eyes: EOMI, no conjunctival erythema  ENMT: No pharyngeal erythema, MMM, no rhinorrhea  Neck: Supple, no elevated JVP, no carotid bruits  Lungs: Clear to auscultation bilaterally. No wheezes, rales, or rhonchi.   Cardiac: Bradycardic, regular rhythm, +S1S2, no murmurs apparent  Abdomen: Soft, nontender, +bowel sounds  Extremities: Moves all extremities x 4, trace lower extremity edema  Neurologic: No focal motor deficits apparent, normal mood and affect    Laboratory Tests:  Lab Results   Component Value Date    CREATININE 1.2 (H) 09/05/2019    BUN 25 (H) 09/05/2019     09/05/2019    K 3.7 09/05/2019     (H) 09/05/2019    CO2 24 09/05/2019     Lab Results   Component Value Date     (H) 04/15/2012     Lab Results   Component Value Date    WBC 6.6 09/05/2019    RBC 4.13 09/05/2019    HGB 12.2 09/05/2019    HCT 38.3 09/05/2019    MCV 92.7 09/05/2019    MCH 29.5 09/05/2019    MCHC 31.9 09/05/2019    RDW 14.1 09/05/2019     09/05/2019    MPV 11.0 09/05/2019     Lab Results   Component Value Date    MG 2.0 09/05/2019     Lab Results   Component Value Date    PROTIME 11.1 03/07/2016    PROTIME 11.1 04/15/2012    INR 1.0 03/07/2016     Lab Results   Component Value Date    TSH 2.270 07/09/2019     Lab Results   Component Value Date    TRIG 78 07/09/2019    TRIG 76 11/15/2018    TRIG 90 07/10/2017     Lab Results   Component Value Date    HDL 59 07/09/2019    HDL 69 11/15/2018    HDL 72 07/10/2017     Lab Results   Component Value Date    LDLCALC 68 07/09/2019    LDLCALC 55 11/15/2018    LDLCALC 58 07/10/2017     Lab Results   Component Value Date    MG 2.0 09/05/2019     Lab Results   Component Value Date    CKTOTAL 41 03/08/2016    CKMB 1.5 09/05/2019    TROPONINI <0.01 09/05/2019     Cardiac Tests:  ECG: SB, rate 55, 1st degree AV block, anterolateral TWI    Telemetry: SB, rate 50's    February 2015 echocardiogram: EF 60%, normal RV function, stage 2 DD, mild valve disease    Echocardiogram: 3/8/16 (read by Dr. Roosevelt Barboza)  Normal left ventricle size and systolic function  Ejection fraction is visually estimated at 65%. Mild left ventricular concentric hypertrophy noted. The left atrium is mildly dilated. Mild mitral regurgitation is present. Mild tricuspid regurgitation. Pia Christensen nuclear stress test: 3/8/16  1. No evidence of pharmacologic stress-induced myocardial   ischemia. 2. No focal wall motion abnormalities are demonstrated. 3. The left ventricular ejection fraction is 68%. Pia Christensen nuclear stress test: 1/9/18  1. No reversible perfusion defect   2. Ejection fraction is 62 %. 3. No significant wall motion abnormality     Impression/Plan:  1. Chest pain -- currently CP free, negative troponin x 2, EKG outlined above, negative Lexiscan nuclear stress test on 1/9/18  2. CAD/MI s/p CABG in 2007 (LIMA-LAD and SVG-LCx. Negative stress test > 3 years ago per patient and negative Lexiscan nuclear stress test on 3/8/16 and 1/9/18. 3. HTN -- 's-160's this admission ('s-130's at prior cardiology office visits); on multiple antihypertensive agents  4. HLD -- on statin  5. Diastolic dysfunction   6. Hypothyroidism - normal TSH in 9/2017 and 7/2019  7. CKD  8.  History of sinus bradycardia - coreg dose decreased to 3.125 mg BID in 11/2016 and then subsequently discontinued, previously wore cardiac monitor, stable    - Results of prior cardiac testing outlined above / discussed options for further CAD work-up (including cardiac catheterization) pending clinical course  - Continue current medications (coreg previously discontinued) / discussed option of adding imdur (she elects to start if CP recurs)  - Continue home BP monitoring  - Case discussed with the patient and her son today     Ezzie Dancer, MD  [facility]) Cardiology

## 2019-09-05 NOTE — PLAN OF CARE
Problem: Pain:  Goal: Pain level will decrease  Description  Pain level will decrease  9/5/2019 1209 by Matt Urena RN  Outcome: Met This Shift     Problem: Pain:  Goal: Control of acute pain  Description  Control of acute pain  9/5/2019 1209 by Matt Urena RN  Outcome: Met This Shift

## 2019-09-06 LAB
EKG ATRIAL RATE: 55 BPM
EKG P AXIS: 59 DEGREES
EKG P-R INTERVAL: 208 MS
EKG Q-T INTERVAL: 428 MS
EKG QRS DURATION: 96 MS
EKG QTC CALCULATION (BAZETT): 409 MS
EKG R AXIS: 22 DEGREES
EKG T AXIS: 118 DEGREES
EKG VENTRICULAR RATE: 55 BPM

## 2019-09-06 PROCEDURE — 93010 ELECTROCARDIOGRAM REPORT: CPT | Performed by: INTERNAL MEDICINE

## 2019-09-09 ENCOUNTER — TELEPHONE (OUTPATIENT)
Dept: ADMINISTRATIVE | Age: 84
End: 2019-09-09

## 2019-09-12 ENCOUNTER — OFFICE VISIT (OUTPATIENT)
Dept: FAMILY MEDICINE CLINIC | Age: 84
End: 2019-09-12
Payer: MEDICARE

## 2019-09-12 VITALS
RESPIRATION RATE: 16 BRPM | HEIGHT: 63 IN | BODY MASS INDEX: 35.44 KG/M2 | SYSTOLIC BLOOD PRESSURE: 129 MMHG | WEIGHT: 200 LBS | DIASTOLIC BLOOD PRESSURE: 74 MMHG | OXYGEN SATURATION: 97 % | HEART RATE: 58 BPM

## 2019-09-12 DIAGNOSIS — R07.89 ATYPICAL CHEST PAIN: Primary | ICD-10-CM

## 2019-09-12 DIAGNOSIS — I25.10 CORONARY ARTERY DISEASE INVOLVING NATIVE CORONARY ARTERY OF NATIVE HEART WITHOUT ANGINA PECTORIS: ICD-10-CM

## 2019-09-12 DIAGNOSIS — M67.442 GANGLION CYST OF FLEXOR TENDON SHEATH OF FINGER OF LEFT HAND: ICD-10-CM

## 2019-09-12 PROCEDURE — 1123F ACP DISCUSS/DSCN MKR DOCD: CPT | Performed by: FAMILY MEDICINE

## 2019-09-12 PROCEDURE — 99213 OFFICE O/P EST LOW 20 MIN: CPT | Performed by: FAMILY MEDICINE

## 2019-09-12 PROCEDURE — G8598 ASA/ANTIPLAT THER USED: HCPCS | Performed by: FAMILY MEDICINE

## 2019-09-12 PROCEDURE — G8427 DOCREV CUR MEDS BY ELIG CLIN: HCPCS | Performed by: FAMILY MEDICINE

## 2019-09-12 PROCEDURE — G8417 CALC BMI ABV UP PARAM F/U: HCPCS | Performed by: FAMILY MEDICINE

## 2019-09-12 PROCEDURE — 1090F PRES/ABSN URINE INCON ASSESS: CPT | Performed by: FAMILY MEDICINE

## 2019-09-12 PROCEDURE — G8400 PT W/DXA NO RESULTS DOC: HCPCS | Performed by: FAMILY MEDICINE

## 2019-09-12 PROCEDURE — 3288F FALL RISK ASSESSMENT DOCD: CPT | Performed by: FAMILY MEDICINE

## 2019-09-12 PROCEDURE — 4040F PNEUMOC VAC/ADMIN/RCVD: CPT | Performed by: FAMILY MEDICINE

## 2019-09-12 PROCEDURE — 1036F TOBACCO NON-USER: CPT | Performed by: FAMILY MEDICINE

## 2019-09-12 ASSESSMENT — ENCOUNTER SYMPTOMS
SHORTNESS OF BREATH: 0
WHEEZING: 0
ABDOMINAL PAIN: 0

## 2019-09-12 NOTE — PROGRESS NOTES
CC   Chief Complaint   Patient presents with    Follow-Up from Hospital     HPI:   Patient comes in today for the below issue(s). Hospital follow up  Admitted obv status for atypical CP  Cardio saw patient- did not make any changes to meds  Cardiac work up not pursued. Has not recurred  States the jaw pain and shoulder pain that she had noted before has also resolved. She has no specific complaints  Issues with blood pressure were followed, overall stable. Readings from home show stability    Lump L hand  Palmar aspect near 4th digit  ROM intact No pain         Review of Systems  Review of Systems   Constitutional: Positive for fatigue. Negative for fever. Respiratory: Negative for shortness of breath and wheezing. Cardiovascular: Negative for palpitations and leg swelling. Gastrointestinal: Negative for abdominal pain.           Past Medical History:   Diagnosis Date    Arthritis     Basal cell cancer 1/2013    nose; Dr Duque Coronary artery disease     bypass 2007; follows with Dr Alisa Rhodes yearly    Diastolic dysfunction 5152    stage 3; follows with Dr. Ailsa Rhodes yearly    Dysphagia     Hyperlipidemia     Hypertension     Hypothyroid     Impaired glucose tolerance     Liver cyst     Lumbosacral radiculopathy at S1 12/2015    left sided    Macular degeneration 6/13    Renal cyst     seen by Dr Allyson Méndez; benign    Vertigo 2/2015         PE:  VS:  /74   Pulse 58   Resp 16   Ht 5' 3\" (1.6 m)   Wt 200 lb (90.7 kg)   LMP  (LMP Unknown)   SpO2 97%   BMI 35.43 kg/m²   Physical Exam  General: Well nourished, well developed, no acute distress  Eyes:  PERRL   Conjunctiva unremarkable   ENT:  TM's intact bilaterally, no effusion   Nasal:  No mucosal edema     No nasal drainage   Oral:  mucosa moist and pink             no posterior pharyngeal drainage     no posterior pharyngeal exudate  Neck:  Supple   No palpable cervical lymphoadenopathy   Thyroid without mass or on his/her After Visit Summary and given to patient at the end of visit. Patient and/or guardian given opportunity to ask questions/raise concerns. She verbalized comfort and understanding of instructions. Follow Up:     Return if symptoms worsen or fail to improve, for keep next appt as scheduled or see sooner if neded. or sooner if the above issues change unexpectedly or new issues develop     This document was prepared at least partially through the use of[employer]els Sleepice recognition software. Although effort is taken to assure the accuracy of this document, it is possible that grammatical, syntax,  or spelling errors may occur.       Electronically signed by Venancio Beth MD [facility]

## 2019-10-01 ENCOUNTER — OFFICE VISIT (OUTPATIENT)
Dept: FAMILY MEDICINE CLINIC | Age: 84
End: 2019-10-01
Payer: MEDICARE

## 2019-10-01 VITALS
SYSTOLIC BLOOD PRESSURE: 146 MMHG | WEIGHT: 201 LBS | RESPIRATION RATE: 18 BRPM | OXYGEN SATURATION: 97 % | DIASTOLIC BLOOD PRESSURE: 77 MMHG | HEIGHT: 63 IN | BODY MASS INDEX: 35.61 KG/M2 | HEART RATE: 53 BPM

## 2019-10-01 DIAGNOSIS — R09.82 POST-NASAL DRAINAGE: ICD-10-CM

## 2019-10-01 DIAGNOSIS — J06.9 VIRAL URI: Primary | ICD-10-CM

## 2019-10-01 PROCEDURE — G8400 PT W/DXA NO RESULTS DOC: HCPCS | Performed by: FAMILY MEDICINE

## 2019-10-01 PROCEDURE — G8427 DOCREV CUR MEDS BY ELIG CLIN: HCPCS | Performed by: FAMILY MEDICINE

## 2019-10-01 PROCEDURE — 4040F PNEUMOC VAC/ADMIN/RCVD: CPT | Performed by: FAMILY MEDICINE

## 2019-10-01 PROCEDURE — 1123F ACP DISCUSS/DSCN MKR DOCD: CPT | Performed by: FAMILY MEDICINE

## 2019-10-01 PROCEDURE — 1036F TOBACCO NON-USER: CPT | Performed by: FAMILY MEDICINE

## 2019-10-01 PROCEDURE — 1090F PRES/ABSN URINE INCON ASSESS: CPT | Performed by: FAMILY MEDICINE

## 2019-10-01 PROCEDURE — 99213 OFFICE O/P EST LOW 20 MIN: CPT | Performed by: FAMILY MEDICINE

## 2019-10-01 PROCEDURE — G8598 ASA/ANTIPLAT THER USED: HCPCS | Performed by: FAMILY MEDICINE

## 2019-10-01 PROCEDURE — G8417 CALC BMI ABV UP PARAM F/U: HCPCS | Performed by: FAMILY MEDICINE

## 2019-10-01 PROCEDURE — G8482 FLU IMMUNIZE ORDER/ADMIN: HCPCS | Performed by: FAMILY MEDICINE

## 2019-10-01 RX ORDER — IPRATROPIUM BROMIDE 21 UG/1
2 SPRAY, METERED NASAL EVERY 12 HOURS
Qty: 1 BOTTLE | Refills: 3 | Status: SHIPPED
Start: 2019-10-01 | End: 2020-09-08 | Stop reason: ALTCHOICE

## 2019-10-23 ENCOUNTER — OFFICE VISIT (OUTPATIENT)
Dept: CARDIOLOGY CLINIC | Age: 84
End: 2019-10-23
Payer: MEDICARE

## 2019-10-23 VITALS
HEIGHT: 63 IN | HEART RATE: 43 BPM | RESPIRATION RATE: 18 BRPM | DIASTOLIC BLOOD PRESSURE: 64 MMHG | SYSTOLIC BLOOD PRESSURE: 104 MMHG | BODY MASS INDEX: 36 KG/M2 | WEIGHT: 203.2 LBS

## 2019-10-23 DIAGNOSIS — Z95.1 HX OF CABG: ICD-10-CM

## 2019-10-23 DIAGNOSIS — I25.10 CORONARY ARTERY DISEASE INVOLVING NATIVE CORONARY ARTERY OF NATIVE HEART WITHOUT ANGINA PECTORIS: Primary | ICD-10-CM

## 2019-10-23 DIAGNOSIS — E78.2 MIXED HYPERLIPIDEMIA: ICD-10-CM

## 2019-10-23 DIAGNOSIS — I10 ESSENTIAL HYPERTENSION: ICD-10-CM

## 2019-10-23 DIAGNOSIS — R00.1 BRADYCARDIA: ICD-10-CM

## 2019-10-23 PROCEDURE — 1036F TOBACCO NON-USER: CPT | Performed by: INTERNAL MEDICINE

## 2019-10-23 PROCEDURE — G8482 FLU IMMUNIZE ORDER/ADMIN: HCPCS | Performed by: INTERNAL MEDICINE

## 2019-10-23 PROCEDURE — 1123F ACP DISCUSS/DSCN MKR DOCD: CPT | Performed by: INTERNAL MEDICINE

## 2019-10-23 PROCEDURE — 93000 ELECTROCARDIOGRAM COMPLETE: CPT | Performed by: INTERNAL MEDICINE

## 2019-10-23 PROCEDURE — G8417 CALC BMI ABV UP PARAM F/U: HCPCS | Performed by: INTERNAL MEDICINE

## 2019-10-23 PROCEDURE — 1090F PRES/ABSN URINE INCON ASSESS: CPT | Performed by: INTERNAL MEDICINE

## 2019-10-23 PROCEDURE — G8400 PT W/DXA NO RESULTS DOC: HCPCS | Performed by: INTERNAL MEDICINE

## 2019-10-23 PROCEDURE — 99214 OFFICE O/P EST MOD 30 MIN: CPT | Performed by: INTERNAL MEDICINE

## 2019-10-23 PROCEDURE — G8598 ASA/ANTIPLAT THER USED: HCPCS | Performed by: INTERNAL MEDICINE

## 2019-10-23 PROCEDURE — G8427 DOCREV CUR MEDS BY ELIG CLIN: HCPCS | Performed by: INTERNAL MEDICINE

## 2019-10-23 PROCEDURE — 4040F PNEUMOC VAC/ADMIN/RCVD: CPT | Performed by: INTERNAL MEDICINE

## 2019-12-13 ENCOUNTER — TELEPHONE (OUTPATIENT)
Dept: FAMILY MEDICINE CLINIC | Age: 84
End: 2019-12-13

## 2020-01-09 ENCOUNTER — OFFICE VISIT (OUTPATIENT)
Dept: FAMILY MEDICINE CLINIC | Age: 85
End: 2020-01-09
Payer: MEDICARE

## 2020-01-09 ENCOUNTER — HOSPITAL ENCOUNTER (OUTPATIENT)
Age: 85
Discharge: HOME | End: 2020-01-11
Payer: MEDICARE

## 2020-01-09 VITALS
SYSTOLIC BLOOD PRESSURE: 132 MMHG | DIASTOLIC BLOOD PRESSURE: 72 MMHG | OXYGEN SATURATION: 97 % | WEIGHT: 201 LBS | HEART RATE: 56 BPM | BODY MASS INDEX: 35.61 KG/M2 | HEIGHT: 63 IN | RESPIRATION RATE: 14 BRPM

## 2020-01-09 LAB
ALBUMIN SERPL-MCNC: 3.9 G/DL (ref 3.5–5.2)
ALP BLD-CCNC: 152 U/L (ref 35–104)
ALT SERPL-CCNC: 12 U/L (ref 0–32)
ANION GAP SERPL CALCULATED.3IONS-SCNC: 17 MMOL/L (ref 7–16)
AST SERPL-CCNC: 15 U/L (ref 0–31)
BASOPHILS ABSOLUTE: 0.02 E9/L (ref 0–0.2)
BASOPHILS RELATIVE PERCENT: 0.3 % (ref 0–2)
BILIRUB SERPL-MCNC: 0.4 MG/DL (ref 0–1.2)
BUN BLDV-MCNC: 20 MG/DL (ref 8–23)
CALCIUM SERPL-MCNC: 9.9 MG/DL (ref 8.6–10.2)
CHLORIDE BLD-SCNC: 104 MMOL/L (ref 98–107)
CHOLESTEROL, TOTAL: 131 MG/DL (ref 0–199)
CO2: 24 MMOL/L (ref 22–29)
CREAT SERPL-MCNC: 1.4 MG/DL (ref 0.5–1)
EOSINOPHILS ABSOLUTE: 0.22 E9/L (ref 0.05–0.5)
EOSINOPHILS RELATIVE PERCENT: 3.8 % (ref 0–6)
GFR AFRICAN AMERICAN: 43
GFR NON-AFRICAN AMERICAN: 36 ML/MIN/1.73
GLUCOSE BLD-MCNC: 118 MG/DL (ref 74–99)
HCT VFR BLD CALC: 42.6 % (ref 34–48)
HDLC SERPL-MCNC: 54 MG/DL
HEMOGLOBIN: 12.9 G/DL (ref 11.5–15.5)
IMMATURE GRANULOCYTES #: 0.02 E9/L
IMMATURE GRANULOCYTES %: 0.3 % (ref 0–5)
LDL CHOLESTEROL CALCULATED: 57 MG/DL (ref 0–99)
LYMPHOCYTES ABSOLUTE: 1.35 E9/L (ref 1.5–4)
LYMPHOCYTES RELATIVE PERCENT: 23 % (ref 20–42)
MCH RBC QN AUTO: 28.7 PG (ref 26–35)
MCHC RBC AUTO-ENTMCNC: 30.3 % (ref 32–34.5)
MCV RBC AUTO: 94.7 FL (ref 80–99.9)
MONOCYTES ABSOLUTE: 0.46 E9/L (ref 0.1–0.95)
MONOCYTES RELATIVE PERCENT: 7.8 % (ref 2–12)
NEUTROPHILS ABSOLUTE: 3.79 E9/L (ref 1.8–7.3)
NEUTROPHILS RELATIVE PERCENT: 64.8 % (ref 43–80)
PDW BLD-RTO: 14.3 FL (ref 11.5–15)
PLATELET # BLD: 220 E9/L (ref 130–450)
PMV BLD AUTO: 12.2 FL (ref 7–12)
POTASSIUM SERPL-SCNC: 4.2 MMOL/L (ref 3.5–5)
RBC # BLD: 4.5 E12/L (ref 3.5–5.5)
SODIUM BLD-SCNC: 145 MMOL/L (ref 132–146)
TOTAL PROTEIN: 6.8 G/DL (ref 6.4–8.3)
TRIGL SERPL-MCNC: 99 MG/DL (ref 0–149)
TSH SERPL DL<=0.05 MIU/L-ACNC: 1.33 UIU/ML (ref 0.27–4.2)
VLDLC SERPL CALC-MCNC: 20 MG/DL
WBC # BLD: 5.9 E9/L (ref 4.5–11.5)

## 2020-01-09 PROCEDURE — G8400 PT W/DXA NO RESULTS DOC: HCPCS | Performed by: FAMILY MEDICINE

## 2020-01-09 PROCEDURE — 1123F ACP DISCUSS/DSCN MKR DOCD: CPT | Performed by: FAMILY MEDICINE

## 2020-01-09 PROCEDURE — 36415 COLL VENOUS BLD VENIPUNCTURE: CPT | Performed by: FAMILY MEDICINE

## 2020-01-09 PROCEDURE — 4040F PNEUMOC VAC/ADMIN/RCVD: CPT | Performed by: FAMILY MEDICINE

## 2020-01-09 PROCEDURE — 99214 OFFICE O/P EST MOD 30 MIN: CPT | Performed by: FAMILY MEDICINE

## 2020-01-09 PROCEDURE — G8482 FLU IMMUNIZE ORDER/ADMIN: HCPCS | Performed by: FAMILY MEDICINE

## 2020-01-09 PROCEDURE — 80053 COMPREHEN METABOLIC PANEL: CPT

## 2020-01-09 PROCEDURE — 85025 COMPLETE CBC W/AUTO DIFF WBC: CPT

## 2020-01-09 PROCEDURE — G8427 DOCREV CUR MEDS BY ELIG CLIN: HCPCS | Performed by: FAMILY MEDICINE

## 2020-01-09 PROCEDURE — G8417 CALC BMI ABV UP PARAM F/U: HCPCS | Performed by: FAMILY MEDICINE

## 2020-01-09 PROCEDURE — 84443 ASSAY THYROID STIM HORMONE: CPT

## 2020-01-09 PROCEDURE — 80061 LIPID PANEL: CPT

## 2020-01-09 PROCEDURE — 1090F PRES/ABSN URINE INCON ASSESS: CPT | Performed by: FAMILY MEDICINE

## 2020-01-09 PROCEDURE — 1036F TOBACCO NON-USER: CPT | Performed by: FAMILY MEDICINE

## 2020-01-09 RX ORDER — LEVOTHYROXINE SODIUM 0.05 MG/1
50 TABLET ORAL DAILY
Qty: 90 TABLET | Refills: 1 | Status: SHIPPED
Start: 2020-01-09 | End: 2020-03-31 | Stop reason: SDUPTHER

## 2020-01-09 RX ORDER — BUSPIRONE HYDROCHLORIDE 5 MG/1
TABLET ORAL
Qty: 90 TABLET | Refills: 3 | Status: SHIPPED
Start: 2020-01-09 | End: 2020-12-13 | Stop reason: SDUPTHER

## 2020-01-09 RX ORDER — CLONIDINE HYDROCHLORIDE 0.1 MG/1
TABLET ORAL
Qty: 180 TABLET | Refills: 1 | Status: SHIPPED
Start: 2020-01-09 | End: 2020-03-31 | Stop reason: SDUPTHER

## 2020-01-09 RX ORDER — ATORVASTATIN CALCIUM 20 MG/1
20 TABLET, FILM COATED ORAL DAILY
Qty: 90 TABLET | Refills: 1 | Status: SHIPPED
Start: 2020-01-09 | End: 2020-03-31 | Stop reason: SDUPTHER

## 2020-01-09 RX ORDER — OXYBUTYNIN CHLORIDE 5 MG/1
TABLET, EXTENDED RELEASE ORAL
Qty: 90 TABLET | Refills: 3 | Status: SHIPPED
Start: 2020-01-09 | End: 2020-10-20

## 2020-01-09 RX ORDER — FAMOTIDINE 20 MG/1
20 TABLET, FILM COATED ORAL 2 TIMES DAILY
Qty: 180 TABLET | Refills: 1 | Status: SHIPPED
Start: 2020-01-09 | End: 2020-03-31 | Stop reason: SDUPTHER

## 2020-01-09 RX ORDER — GABAPENTIN 100 MG/1
CAPSULE ORAL
Qty: 180 CAPSULE | Refills: 0 | Status: SHIPPED
Start: 2020-01-09 | End: 2020-03-31 | Stop reason: SDUPTHER

## 2020-01-09 RX ORDER — ENALAPRIL MALEATE 20 MG/1
20 TABLET ORAL 2 TIMES DAILY
Qty: 180 TABLET | Refills: 1 | Status: SHIPPED
Start: 2020-01-09 | End: 2020-03-31 | Stop reason: SDUPTHER

## 2020-01-09 RX ORDER — NIFEDIPINE 30 MG/1
TABLET, EXTENDED RELEASE ORAL
Qty: 90 TABLET | Refills: 1 | Status: SHIPPED
Start: 2020-01-09 | End: 2020-03-31 | Stop reason: SDUPTHER

## 2020-01-09 RX ORDER — FUROSEMIDE 20 MG/1
20 TABLET ORAL DAILY
Qty: 90 TABLET | Refills: 1 | Status: SHIPPED
Start: 2020-01-09 | End: 2020-03-31 | Stop reason: SDUPTHER

## 2020-01-09 ASSESSMENT — PATIENT HEALTH QUESTIONNAIRE - PHQ9
2. FEELING DOWN, DEPRESSED OR HOPELESS: 0
SUM OF ALL RESPONSES TO PHQ QUESTIONS 1-9: 0
1. LITTLE INTEREST OR PLEASURE IN DOING THINGS: 0
SUM OF ALL RESPONSES TO PHQ9 QUESTIONS 1 & 2: 0
SUM OF ALL RESPONSES TO PHQ QUESTIONS 1-9: 0

## 2020-01-09 ASSESSMENT — ENCOUNTER SYMPTOMS
ABDOMINAL PAIN: 0
WHEEZING: 0
SHORTNESS OF BREATH: 0
BLOOD IN STOOL: 0

## 2020-01-09 NOTE — PROGRESS NOTES
Negative for congestion. Respiratory: Negative for shortness of breath and wheezing. Cardiovascular: Positive for leg swelling (Baseline, stable). Negative for chest pain and palpitations. Gastrointestinal: Negative for abdominal pain and blood in stool. Genitourinary: Negative for difficulty urinating and frequency. Musculoskeletal: Positive for arthralgias and back pain (Doing well on gabapentin). Skin: Negative for rash. Recently treated for skin cancer on her right lower extremity by dermatology   Neurological: Negative for dizziness and headaches. Psychiatric/Behavioral: Negative for sleep disturbance.           Past Medical History:   Diagnosis Date    Arthritis     Basal cell cancer 1/2013    nose; Dr Amber Perkins Coronary artery disease     bypass 2007; follows with Dr Anderson Acuña yearly    Diastolic dysfunction 8627    stage 3; follows with Dr. Anderson Acuña yearly    Dysphagia     Hyperlipidemia     Hypertension     Hypothyroid     Impaired glucose tolerance     Liver cyst     Lumbosacral radiculopathy at S1 12/2015    left sided    Macular degeneration 6/13    Renal cyst     seen by Dr Kate Bangura; benign    Vertigo 2/2015         PE:  VS:  /72 (Site: Left Upper Arm)   Pulse 56   Resp 14   Ht 5' 3\" (1.6 m)   Wt 201 lb (91.2 kg)   LMP  (LMP Unknown)   SpO2 97%   BMI 35.61 kg/m²   Physical Exam  General: Well nourished, well developed, no acute distress  Eyes:  PERRL   Conjunctiva unremarkable   ENT:  TM's intact bilaterally, no effusion   Nasal:  No mucosal edema     No nasal drainage   Oral:  mucosa moist and pink             no posterior pharyngeal drainage     no posterior pharyngeal exudate  Neck:  Supple   No palpable cervical lymphoadenopathy   Thyroid without mass or enlargement  Resp: Lungs CTAB   Equal and adequate air exchange without accessory muscle use   No rales, rhonchi or wheeze  CV: S1S2 RRR no ectopy   No murmur   Intact distal pulses   Tr LLE

## 2020-01-11 ASSESSMENT — ENCOUNTER SYMPTOMS: BACK PAIN: 1

## 2020-02-17 RX ORDER — GABAPENTIN 100 MG/1
CAPSULE ORAL
Qty: 180 CAPSULE | Refills: 0 | OUTPATIENT
Start: 2020-02-17

## 2020-05-12 ENCOUNTER — VIRTUAL VISIT (OUTPATIENT)
Dept: FAMILY MEDICINE CLINIC | Age: 85
End: 2020-05-12
Payer: MEDICARE

## 2020-05-12 PROCEDURE — 1123F ACP DISCUSS/DSCN MKR DOCD: CPT | Performed by: FAMILY MEDICINE

## 2020-05-12 PROCEDURE — G0438 PPPS, INITIAL VISIT: HCPCS | Performed by: FAMILY MEDICINE

## 2020-05-12 PROCEDURE — 4040F PNEUMOC VAC/ADMIN/RCVD: CPT | Performed by: FAMILY MEDICINE

## 2020-05-12 ASSESSMENT — LIFESTYLE VARIABLES
HOW OFTEN DURING THE LAST YEAR HAVE YOU BEEN UNABLE TO REMEMBER WHAT HAPPENED THE NIGHT BEFORE BECAUSE YOU HAD BEEN DRINKING: 0
HOW OFTEN DO YOU HAVE A DRINK CONTAINING ALCOHOL: 1
HOW OFTEN DURING THE LAST YEAR HAVE YOU FAILED TO DO WHAT WAS NORMALLY EXPECTED FROM YOU BECAUSE OF DRINKING: 0
HOW OFTEN DURING THE LAST YEAR HAVE YOU NEEDED AN ALCOHOLIC DRINK FIRST THING IN THE MORNING TO GET YOURSELF GOING AFTER A NIGHT OF HEAVY DRINKING: 0
HOW OFTEN DURING THE LAST YEAR HAVE YOU FOUND THAT YOU WERE NOT ABLE TO STOP DRINKING ONCE YOU HAD STARTED: 0
HOW OFTEN DO YOU HAVE SIX OR MORE DRINKS ON ONE OCCASION: 0
HAS A RELATIVE, FRIEND, DOCTOR, OR ANOTHER HEALTH PROFESSIONAL EXPRESSED CONCERN ABOUT YOUR DRINKING OR SUGGESTED YOU CUT DOWN: 0
AUDIT TOTAL SCORE: 1
HOW OFTEN DURING THE LAST YEAR HAVE YOU HAD A FEELING OF GUILT OR REMORSE AFTER DRINKING: 0
AUDIT-C TOTAL SCORE: 1
HAVE YOU OR SOMEONE ELSE BEEN INJURED AS A RESULT OF YOUR DRINKING: 0
HOW MANY STANDARD DRINKS CONTAINING ALCOHOL DO YOU HAVE ON A TYPICAL DAY: 0

## 2020-05-12 ASSESSMENT — PATIENT HEALTH QUESTIONNAIRE - PHQ9
SUM OF ALL RESPONSES TO PHQ QUESTIONS 1-9: 0
SUM OF ALL RESPONSES TO PHQ QUESTIONS 1-9: 0

## 2020-05-12 NOTE — PATIENT INSTRUCTIONS
week) Tofu Nuts or seeds (unsalted, dry-roasted), low-sodium peanut butter Dried peas, beans, and lentils   Any smoked, cured, salted, or canned meat, fish, or poultry (including pelaez, chipped beef, cold cuts, hot dogs, sausages, sardines, and anchovies) Poultry skins Breaded and/or fried fish or meats Canned peas, beans, and lentils Salted nuts   Fats and Oils   Olive oil and canola oil Low-sodium, low-fat salad dressings and mayonnaise   Butter, margarine, coconut and palm oils, pelaez fat   Snacks, Sweets, and Condiments   Low-sodium or unsalted versions of broths, soups, soy sauce, and condiments Pepper, herbs, and spices; vinegar, lemon, or lime juice Low-fat frozen desserts (yogurt, sherbet, fruit bars) Sugar, cocoa powder, honey, syrup, jam, and preserves Low-fat, trans-fat free cookies, cakes, and pies James and animal crackers, fig bars, jaciel snaps   High-fat desserts Broth, soups, gravies, and sauces, made from instant mixes or other high-sodium ingredients Salted snack foods Canned olives Meat tenderizers, seasoning salt, and most flavored vinegars   Beverages   Low-sodium carbonated beverages Tea and coffee in moderation Soy milk   Commercially softened water   Suggestions   Make whole grains, fruits, and vegetables the base of your diet. Choose heart-healthy fats such as canola, olive, and flaxseed oil, and foods high in heart-healthy fats, such as nuts, seeds, soybeans, tofu, and fish. Eat fish at least twice per week; the fish highest in omega-3 fatty acids and lowest in mercury include salmon, herring, mackerel, sardines, and canned chunk light tuna. If you eat fish less than twice per week or have high triglycerides, talk to your doctor about taking fish oil supplements. Read food labels.    For products low in fat and cholesterol, look for fat free, low-fat, cholesterol free, saturated fat free, and trans fat freeAlso scan the Nutrition Facts Label, which lists saturated fat, trans fat, and LDL cholesterol   Omega-3 fatty acids  particularly those found in fatty fish (such as salmon, trout, tuna, mackerel, herring, and sardines); can decrease risk of arrhythmias, decrease triglyceride levels, and slightly lower blood pressure   The fats that you want to limit are:   Saturated fat  found in animal products, many fast foods, and a few vegetables; increases total blood cholesterol, including LDL levels   Animal fats that are saturated include: butter, lard, whole-milk dairy products, meat fat, and poultry skin   Vegetable fats that are saturated include: hydrogenated shortening, palm oil, coconut oil, cocoa butter   Hydrogenated or trans fat  found in margarine and vegetable shortening, most shelf stable snack foods, and fried foods; increases LDL and decreases HDL     It is generally recommended that you limit your total fat for the day to less than 30% of your total calories. If you follow an 1800-calorie heart healthy diet, for example, this would mean 60 grams of fat or less per day. Saturated fat and trans fat in your diet raises your blood cholesterol the most, much more than dietary cholesterol does. For this reason, on a heart-healthy diet, less than 7% of your calories should come from saturated fat and ideally 0% from trans fat. On an 1800-calorie diet, this translates into less than 14 grams of saturated fat per day, leaving 46 grams of fat to come from mono- and polyunsaturated fats.    Food Choices on a Heart Healthy Diet   Food Category   Foods Recommended   Foods to Avoid   Grains   Breads and rolls without salted tops Most dry and cooked cereals Unsalted crackers and breadsticks Low-sodium or homemade breadcrumbs or stuffing All rice and pastas   Breads, rolls, and crackers with salted tops High-fat baked goods (eg, muffins, donuts, pastries) Quick breads, self-rising flour, and biscuit mixes Regular bread crumbs Instant hot cereals Commercially prepared rice, pasta, or stuffing mixes Salted snack foods Canned olives Meat tenderizers, seasoning salt, and most flavored vinegars   Beverages   Low-sodium carbonated beverages Tea and coffee in moderation Soy milk   Commercially softened water   Suggestions   Make whole grains, fruits, and vegetables the base of your diet. Choose heart-healthy fats such as canola, olive, and flaxseed oil, and foods high in heart-healthy fats, such as nuts, seeds, soybeans, tofu, and fish. Eat fish at least twice per week; the fish highest in omega-3 fatty acids and lowest in mercury include salmon, herring, mackerel, sardines, and canned chunk light tuna. If you eat fish less than twice per week or have high triglycerides, talk to your doctor about taking fish oil supplements. Read food labels. For products low in fat and cholesterol, look for fat free, low-fat, cholesterol free, saturated fat free, and trans fat freeAlso scan the Nutrition Facts Label, which lists saturated fat, trans fat, and cholesterol amounts. For products low in sodium, look for sodium free, very low sodium, low sodium, no added salt, and unsalted   Skip the salt when cooking or at the table; if food needs more flavor, get creative and try out different herbs and spices. Garlic and onion also add substantial flavor to foods. Trim any visible fat off meat and poultry before cooking, and drain the fat off after dykes. Use cooking methods that require little or no added fat, such as grilling, boiling, baking, poaching, broiling, roasting, steaming, stir-frying, and sauting. Avoid fast food and convenience food. They tend to be high in saturated and trans fat and have a lot of added salt. Talk to a registered dietitian for individualized diet advice.       Last Reviewed: March 2011 Meka Tyler MS, MPH, RD   Updated: 3/29/2011   ·     Keeping Home a [facility]       As we get older, changes in balance, gait, strength, vision, hearing, and cognition make even the most Consumer Home Safety Checklist,\" it is important to check for potential hazards in each room. And remember, proper lighting is an essential factor in home safety. If you cannot see clearly, you are more likely to fall. Important questions to ask yourself include:   Are lamp, electric, extension, and telephone cords placed out of the flow of traffic and maintained in good condition? Have frayed cords been replaced? Are all small rugs and runners slip resistant? If not, you can secure them to the floor with a special double-sided carpet tape. Are smoke detectors properly locatedone on every floor of your home and one outside of every sleeping area? Are they in good working order? Are batteries replaced at least once a year? Do you have a well-maintained carbon monoxide detector outside every sleeping are in your home? Does your furniture layout leave plenty of space to maneuver between and around chairs, tables, beds, and sofas? Are hallways, stairs and passages between rooms well lit? Can you reach a lamp without getting out of bed? Are floor surfaces well maintained? Shag rugs, high-pile carpeting, tile floors, and polished wood floors can be particularly slippery. Stairs should always have handrails and be carpeted or fitted with a non-skid tread. Is your telephone easily reachable. Is the cord safely tucked away? Room by Room   According to the Association of Aging, bathrooms and ramirez are the two most potentially hazardous rooms in your home. In the Kitchen    Be sure your stove is in proper working order and always make sure burners and the oven are off before you go out or go to sleep. Keep pots on the back burners, turn handles away from the front of the stove, and keep stove clean and free of grease build-up. Kitchen ventilation systems and range exhausts should be working properly.     Keep flammable objects such as towels and pot holders away from the cooking area except when in

## 2020-05-12 NOTE — PROGRESS NOTES
TeleMedicine Patient Consent    This visit was performed as a virtual video visit using a synchronous, two-way, audio-video telehealth technology platform. Patient identification was verified at the start of the visit, including the patient's telephone number and physical location. I discussed with the patient the nature of our telehealth visits, that:     1. Due to the nature of an audio- video modality, the only components of a physical exam that could be done are the elements supported by direct observation. 2. The provider will evaluate the patient and recommend diagnostics and treatments based on their assessment. 3. If it was felt that the patient should be evaluated in clinic or an emergency room setting, then they would be directed there. 4. Our sessions are not being recorded and that personal health information is protected. 5. Our team would provide follow up care in person if/when the patient needs it. Patient does agree to proceed with telemedicine consultation. Patient location: home address in [facility]    Physician location: regular office location    This visit was completed virtually using Doxy. me    This visit was performed during the 2713 public health crisis and COVID-19 pandemic.   *Add GT modifier to all Video Visits*

## 2020-05-12 NOTE — PROGRESS NOTES
Medicare Annual Wellness Visit  Name: Zeeshan Shaffer Date: 2020   MRN: [id number] Sex: Female   Age: 82 y.o. Ethnicity: Non-/Non    : 1935 Race: Joaquin Green is here for Medicare AWV    Screenings for behavioral, psychosocial and functional/safety risks, and cognitive dysfunction are all negative except as indicated below. These results, as well as other patient data from the [address] form, are documented in Flowsheets linked to this Encounter. Interval history obtained, no new issues    Overall feeling well today. Has no new complaints. Keeping up with her specialist appointments    No Known Allergies      Prior to Visit Medications    Medication Sig Taking? Authorizing Provider   levothyroxine (SYNTHROID) 50 MCG tablet Take 1 tablet by mouth Daily Yes Li Boyer MD   famotidine (PEPCID) 20 MG tablet Take 1 tablet by mouth 2 times daily Yes Li Boyer MD   enalapril (VASOTEC) 20 MG tablet Take 1 tablet by mouth 2 times daily Yes Li Boyer MD   atorvastatin (LIPITOR) 20 MG tablet Take 1 tablet by mouth daily Yes Li Boyer MD   furosemide (LASIX) 20 MG tablet Take 1 tablet by mouth daily Yes Li Boyer MD   cloNIDine (CATAPRES) 0.1 MG tablet TAKE 1 TABLET TWICE A DAY Yes Li Boyer MD   NIFEdipine (PROCARDIA XL) 30 MG extended release tablet TAKE ONE TABLET BY MOUTH EVERY DAY Yes Li Boyer MD   gabapentin (NEURONTIN) 100 MG capsule TAKE 1 CAPSULE TWICE DAILY. Yes Li Boyer MD   busPIRone (BUSPAR) 5 MG tablet TAKE 1 TABLET DAILY AS    NEEDED.  Yes Li Boyer MD   oxybutynin (DITROPAN-XL) 5 MG extended release tablet TAKE 1 TABLET EVERY MORNING Yes Li Boyer MD   ipratropium (ATROVENT) 0.03 % nasal spray 2 sprays by Each Nostril route every 12 hours  Patient taking differently: 2 sprays by Each Nostril route every 12 hours as needed  Yes Rebel Lara MD   aspirin 81 MG tablet Take 1 tablet by mouth nightly  Yes Historical Provider, MD   guanFACINE (TENEX) 1 MG tablet nightly  Yes Historical Provider, MD   Multiple Vitamins-Minerals (PRESERVISION AREDS) TABS Take by mouth 2 times daily Yes Historical Provider, MD   nitroGLYCERIN (NITROSTAT) 0.4 MG SL tablet Place 1 tablet under the tongue every 5 minutes as needed for Chest pain Yes Clif Maddox MD   calcium carbonate (CALCIUM 600) 600 MG TABS tablet Take 1 tablet by mouth 2 times daily Yes Historical Provider, MD   Biotin 67365 MCG TABS Take 1 tablet by mouth every morning  Yes Historical Provider, MD   vitamin B-12 (CYANOCOBALAMIN) 1000 MCG tablet Take 2,500 mcg by mouth every morning Instructed to hold 5 days pre-op Yes Clif Maddox MD   Calcium Polycarbophil (FIBER) 625 MG TABS Take 2 tablets by mouth every morning   Historical Provider, MD         Past Medical History:   Diagnosis Date    Arthritis     Basal cell cancer 1/2013    nose; Dr Bernard Both Coronary artery disease     bypass 2007; follows with Dr Shira Talamantes yearly    Diastolic dysfunction 2774    stage 3; follows with Dr. Shira Talamantes yearly    Dysphagia     Hyperlipidemia     Hypertension     Hypothyroid     Impaired glucose tolerance     Liver cyst     Lumbosacral radiculopathy at S1 12/2015    left sided    Macular degeneration 6/13    Renal cyst     seen by Dr Joanna Ruiz; benign    Vertigo 2/2015       Past Surgical History:   Procedure Laterality Date    APPENDECTOMY      CARDIAC SURGERY  2007    double bypass @ [facility] (Dr. Benito Renee)   [address]      right    COLONOSCOPY  2007    Dr Coco García      partial (ovaries remain)         Family History   Problem Relation Age of Onset    Heart Disease Mother     Cancer Father         lung       CareTeam (Including outside providers/suppliers regularly involved in providing care):   Patient Care Team:  Clif Madodx MD as PCP - General  Clif Maddox MD as PCP - [facility] to the emergency declaration under the [facility], [facility] waiver authority and the [employer] Act, this Virtual Visit was conducted with patient's (and/or legal guardian's) consent, to reduce the patient's risk of exposure to COVID-19 and provide necessary medical care. The patient (and/or legal guardian) has also been advised to contact this office for worsening conditions or problems, and seek emergency medical treatment and/or call 911 if deemed necessary. Patient identification was verified at the start of the visit: Yes    Services were provided through a video synchronous discussion virtually to substitute for in-person clinic visit. Patient and provider were located at their individual homes. --Wendi Johnson MD on 5/12/2020 at 11:12 AM    An electronic signature was used to authenticate this note.

## 2020-06-25 ENCOUNTER — TELEPHONE (OUTPATIENT)
Dept: ADMINISTRATIVE | Age: 85
End: 2020-06-25

## 2020-06-30 ENCOUNTER — OFFICE VISIT (OUTPATIENT)
Dept: FAMILY MEDICINE CLINIC | Age: 85
End: 2020-06-30
Payer: MEDICARE

## 2020-06-30 ENCOUNTER — TELEPHONE (OUTPATIENT)
Dept: CARDIOLOGY CLINIC | Age: 85
End: 2020-06-30

## 2020-06-30 VITALS
OXYGEN SATURATION: 98 % | DIASTOLIC BLOOD PRESSURE: 80 MMHG | HEIGHT: 63 IN | RESPIRATION RATE: 18 BRPM | WEIGHT: 201 LBS | HEART RATE: 49 BPM | SYSTOLIC BLOOD PRESSURE: 130 MMHG | TEMPERATURE: 97.4 F | BODY MASS INDEX: 35.61 KG/M2

## 2020-06-30 PROCEDURE — 93000 ELECTROCARDIOGRAM COMPLETE: CPT | Performed by: STUDENT IN AN ORGANIZED HEALTH CARE EDUCATION/TRAINING PROGRAM

## 2020-06-30 PROCEDURE — G8427 DOCREV CUR MEDS BY ELIG CLIN: HCPCS | Performed by: STUDENT IN AN ORGANIZED HEALTH CARE EDUCATION/TRAINING PROGRAM

## 2020-06-30 PROCEDURE — 1090F PRES/ABSN URINE INCON ASSESS: CPT | Performed by: STUDENT IN AN ORGANIZED HEALTH CARE EDUCATION/TRAINING PROGRAM

## 2020-06-30 PROCEDURE — 1123F ACP DISCUSS/DSCN MKR DOCD: CPT | Performed by: STUDENT IN AN ORGANIZED HEALTH CARE EDUCATION/TRAINING PROGRAM

## 2020-06-30 PROCEDURE — 4040F PNEUMOC VAC/ADMIN/RCVD: CPT | Performed by: STUDENT IN AN ORGANIZED HEALTH CARE EDUCATION/TRAINING PROGRAM

## 2020-06-30 PROCEDURE — G8400 PT W/DXA NO RESULTS DOC: HCPCS | Performed by: STUDENT IN AN ORGANIZED HEALTH CARE EDUCATION/TRAINING PROGRAM

## 2020-06-30 PROCEDURE — G8417 CALC BMI ABV UP PARAM F/U: HCPCS | Performed by: STUDENT IN AN ORGANIZED HEALTH CARE EDUCATION/TRAINING PROGRAM

## 2020-06-30 PROCEDURE — 1036F TOBACCO NON-USER: CPT | Performed by: STUDENT IN AN ORGANIZED HEALTH CARE EDUCATION/TRAINING PROGRAM

## 2020-06-30 PROCEDURE — 99213 OFFICE O/P EST LOW 20 MIN: CPT | Performed by: STUDENT IN AN ORGANIZED HEALTH CARE EDUCATION/TRAINING PROGRAM

## 2020-06-30 ASSESSMENT — ENCOUNTER SYMPTOMS
ABDOMINAL PAIN: 0
COUGH: 0
BACK PAIN: 1
VOMITING: 0
NAUSEA: 0
SHORTNESS OF BREATH: 0
RHINORRHEA: 0

## 2020-06-30 NOTE — TELEPHONE ENCOUNTER
Patient notified of Dr. Amadou Saenz recommendations. She will follow-up with PCP and call our office to let us know what PCP recommends. If symptoms worsen, she will go to ER.

## 2020-06-30 NOTE — PROGRESS NOTES
S: 82 y.o. female with   Chief Complaint   Patient presents with    Shoulder Pain       Chest pains/shoulder/arm pains - follows with Dr Jaqui Vizcarra.  Same site as she has felt a pain prior to and MI, however she also has post herpetic neuralgia in a similar distribution. No exertional sx (able to climb steps) but pain replicated with arm movement, particularly across the body. BP Readings from Last 3 Encounters:   06/30/20 (!) 167/79   01/09/20 132/72   10/23/19 104/64       O: VS:  height is 5' 3\" (1.6 m) and weight is 201 lb (91.2 kg). Her temporal temperature is 97.4 °F (36.3 °C). Her blood pressure is 167/79 (abnormal) and her pulse is 49 (abnormal). Her respiration is 18 and oxygen saturation is 98%. AAO/NAD, appropriate affect for mood  CV:  RRR, no murmur  Resp: CTAB      Impression/Plan:   1) Chest/Arm/Shoulder Pains - Check EKG - see cardio ASAP, offer warm compresses/stretches for back. Health Maintenance Due   Topic Date Due    Shingles Vaccine (2 of 3) 03/03/2016         Attending Physician Statement  I have discussed the case, including pertinent history and exam findings with the resident. I also have seen the patient and performed key portions of the examination. I agree with the documented assessment and plan.       Daryle Gambles, MD

## 2020-06-30 NOTE — PROGRESS NOTES
pain and leg swelling. Gastrointestinal: Negative for abdominal pain, nausea and vomiting. Genitourinary: Negative for dysuria and hematuria. Musculoskeletal: Positive for arthralgias, back pain and myalgias. Skin: Negative for rash and wound. Neurological: Negative for dizziness and light-headedness. Physical Exam   Vitals: /80   Pulse (!) 49   Temp 97.4 °F (36.3 °C) (Temporal)   Resp 18   Ht 5' 3\" (1.6 m)   Wt 201 lb (91.2 kg)   LMP  (LMP Unknown)   SpO2 98%   BMI 35.61 kg/m²   Physical Exam    General:  Well developed, well nourished, well groomed. No apparent distress. HEENT:  Normocephalic, atraumatic. No scleral icterus. No conjunctival injection. No nasal discharge. Heart:  RRR, no murmurs, gallops, or rubs  Lungs:  CTA bilaterally, bilat symmetrical expansion, no wheeze, rales, or rhonchi  Abdomen: Bowel sounds present, soft, nontender, no masses, no organomegaly, no peritoneal signs  Extremities:  No clubbing, cyanosis, Bilateral LE edema worse on the R than L  Neuro:  Alert and oriented x3, no focal deficits  Back: No rash present. There is reproducible pain medial to the lower portion of the scapula with palpation. Assessment and Plan       1. Atypical chest pain  - More likely musculoskeletal back/shoulder pain  - EKG is not showing any acute changes   - The pain is reproducible with motion and palpation  - Discussed she should still have extremely low threshold to go to ER-including radiation, nausea, shortness of breath and diaphoresis  - She was encouraged to follow up closely with her cardiologist  - Stretches given, encouraged to use tylenol to see if it improves  - EKG 12 lead; Future  - EKG 12 lead      Counseled regarding above diagnosis, including possible risks and complications,  especially if left uncontrolled.  Counseled regarding the possible side effects, risks, benefits and alternatives to treatment;patient and/or guardian verbalizes understanding, Vitamins-Minerals (PRESERVISION AREDS) TABS Take by mouth 2 times daily      nitroGLYCERIN (NITROSTAT) 0.4 MG SL tablet Place 1 tablet under the tongue every 5 minutes as needed for Chest pain 25 tablet 3    calcium carbonate (CALCIUM 600) 600 MG TABS tablet Take 1 tablet by mouth 2 times daily      Biotin 00547 MCG TABS Take 1 tablet by mouth every morning       Calcium Polycarbophil (FIBER) 625 MG TABS Take 2 tablets by mouth every morning       vitamin B-12 (CYANOCOBALAMIN) 1000 MCG tablet Take 2,500 mcg by mouth every morning Instructed to hold 5 days pre-op 30 tablet 5    gabapentin (NEURONTIN) 100 MG capsule TAKE 1 CAPSULE TWICE DAILY. 180 capsule 0     No current facility-administered medications for this visit. Nemesio Heredia MD     This document may have been prepared at least partially through the use of voice recognition software. Although effort is taken to assure the accuracy ofthis document, it is possible that grammatical, syntax,  or spelling errors may occur.

## 2020-06-30 NOTE — TELEPHONE ENCOUNTER
Patient called with complaints of back pain around her right shoulder blade for the last couple of nights at rest.  She states she does not exert herself. She denies any chest pain or shortness of breath but states she did not have these symptoms prior to her previous heart attack. Please advise.

## 2020-09-08 ENCOUNTER — HOSPITAL ENCOUNTER (OUTPATIENT)
Age: 85
Discharge: HOME | End: 2020-09-10
Payer: MEDICARE

## 2020-09-08 ENCOUNTER — HOSPITAL ENCOUNTER (OUTPATIENT)
Dept: GENERAL RADIOLOGY | Age: 85
Discharge: HOME | End: 2020-09-10
Payer: MEDICARE

## 2020-09-08 ENCOUNTER — OFFICE VISIT (OUTPATIENT)
Dept: FAMILY MEDICINE CLINIC | Age: 85
End: 2020-09-08
Payer: MEDICARE

## 2020-09-08 VITALS
DIASTOLIC BLOOD PRESSURE: 74 MMHG | HEIGHT: 63 IN | WEIGHT: 197 LBS | SYSTOLIC BLOOD PRESSURE: 138 MMHG | OXYGEN SATURATION: 96 % | BODY MASS INDEX: 34.91 KG/M2 | RESPIRATION RATE: 16 BRPM | HEART RATE: 59 BPM | TEMPERATURE: 97.5 F

## 2020-09-08 PROBLEM — E21.3 HYPERPARATHYROIDISM (HCC): Status: ACTIVE | Noted: 2020-09-08

## 2020-09-08 PROCEDURE — G8427 DOCREV CUR MEDS BY ELIG CLIN: HCPCS | Performed by: FAMILY MEDICINE

## 2020-09-08 PROCEDURE — G8417 CALC BMI ABV UP PARAM F/U: HCPCS | Performed by: FAMILY MEDICINE

## 2020-09-08 PROCEDURE — 1090F PRES/ABSN URINE INCON ASSESS: CPT | Performed by: FAMILY MEDICINE

## 2020-09-08 PROCEDURE — 99214 OFFICE O/P EST MOD 30 MIN: CPT | Performed by: FAMILY MEDICINE

## 2020-09-08 PROCEDURE — G8400 PT W/DXA NO RESULTS DOC: HCPCS | Performed by: FAMILY MEDICINE

## 2020-09-08 PROCEDURE — 1036F TOBACCO NON-USER: CPT | Performed by: FAMILY MEDICINE

## 2020-09-08 PROCEDURE — 1123F ACP DISCUSS/DSCN MKR DOCD: CPT | Performed by: FAMILY MEDICINE

## 2020-09-08 PROCEDURE — 71101 X-RAY EXAM UNILAT RIBS/CHEST: CPT

## 2020-09-08 PROCEDURE — 4040F PNEUMOC VAC/ADMIN/RCVD: CPT | Performed by: FAMILY MEDICINE

## 2020-09-08 ASSESSMENT — ENCOUNTER SYMPTOMS
SHORTNESS OF BREATH: 0
TROUBLE SWALLOWING: 0
CONSTIPATION: 0
BACK PAIN: 1
ABDOMINAL PAIN: 0
WHEEZING: 0

## 2020-09-09 ENCOUNTER — HOSPITAL ENCOUNTER (OUTPATIENT)
Age: 85
Discharge: HOME | End: 2020-09-11
Payer: MEDICARE

## 2020-09-09 ENCOUNTER — PATIENT MESSAGE (OUTPATIENT)
Dept: FAMILY MEDICINE CLINIC | Age: 85
End: 2020-09-09

## 2020-09-09 LAB
CHOLESTEROL, TOTAL: 126 MG/DL (ref 0–199)
HBA1C MFR BLD: 5.7 % (ref 4–5.6)
HDLC SERPL-MCNC: 48 MG/DL
LDL CHOLESTEROL CALCULATED: 54 MG/DL (ref 0–99)
TRIGL SERPL-MCNC: 119 MG/DL (ref 0–149)
TSH SERPL DL<=0.05 MIU/L-ACNC: 2.73 UIU/ML (ref 0.27–4.2)
VLDLC SERPL CALC-MCNC: 24 MG/DL

## 2020-09-09 PROCEDURE — 83036 HEMOGLOBIN GLYCOSYLATED A1C: CPT

## 2020-09-09 PROCEDURE — 80061 LIPID PANEL: CPT

## 2020-09-09 PROCEDURE — 84443 ASSAY THYROID STIM HORMONE: CPT

## 2020-09-09 NOTE — TELEPHONE ENCOUNTER
From: Gladys Other  To: Arleth Prieto MD  Sent: 9/9/2020 2:32 PM EDT  Subject: Test Results Question    The aorta issue is old and refers to calcium build up (hardening of the artery) within it. To what extent is this a problem? How much build up is there and is it worrisome? Getting old is not for sissies.

## 2020-09-18 ENCOUNTER — HOSPITAL ENCOUNTER (OUTPATIENT)
Dept: CT IMAGING | Age: 85
Discharge: HOME | End: 2020-09-20
Payer: MEDICARE

## 2020-09-18 ENCOUNTER — HOSPITAL ENCOUNTER (OUTPATIENT)
Dept: ULTRASOUND IMAGING | Age: 85
Discharge: HOME | End: 2020-09-20
Payer: MEDICARE

## 2020-09-18 PROCEDURE — 76705 ECHO EXAM OF ABDOMEN: CPT

## 2020-09-18 PROCEDURE — 71250 CT THORAX DX C-: CPT

## 2020-09-20 PROBLEM — I77.819 AORTIC ECTASIA (HCC): Status: ACTIVE | Noted: 2020-09-01

## 2020-10-07 ENCOUNTER — OFFICE VISIT (OUTPATIENT)
Dept: FAMILY MEDICINE CLINIC | Age: 85
End: 2020-10-07
Payer: MEDICARE

## 2020-10-07 VITALS
BODY MASS INDEX: 35.08 KG/M2 | OXYGEN SATURATION: 97 % | HEART RATE: 51 BPM | HEIGHT: 63 IN | SYSTOLIC BLOOD PRESSURE: 120 MMHG | RESPIRATION RATE: 16 BRPM | TEMPERATURE: 97.4 F | DIASTOLIC BLOOD PRESSURE: 60 MMHG | WEIGHT: 198 LBS

## 2020-10-07 PROCEDURE — 1036F TOBACCO NON-USER: CPT | Performed by: FAMILY MEDICINE

## 2020-10-07 PROCEDURE — G8400 PT W/DXA NO RESULTS DOC: HCPCS | Performed by: FAMILY MEDICINE

## 2020-10-07 PROCEDURE — 93000 ELECTROCARDIOGRAM COMPLETE: CPT | Performed by: FAMILY MEDICINE

## 2020-10-07 PROCEDURE — G8427 DOCREV CUR MEDS BY ELIG CLIN: HCPCS | Performed by: FAMILY MEDICINE

## 2020-10-07 PROCEDURE — G8417 CALC BMI ABV UP PARAM F/U: HCPCS | Performed by: FAMILY MEDICINE

## 2020-10-07 PROCEDURE — 99214 OFFICE O/P EST MOD 30 MIN: CPT | Performed by: FAMILY MEDICINE

## 2020-10-07 PROCEDURE — 1090F PRES/ABSN URINE INCON ASSESS: CPT | Performed by: FAMILY MEDICINE

## 2020-10-07 PROCEDURE — 1123F ACP DISCUSS/DSCN MKR DOCD: CPT | Performed by: FAMILY MEDICINE

## 2020-10-07 PROCEDURE — 4040F PNEUMOC VAC/ADMIN/RCVD: CPT | Performed by: FAMILY MEDICINE

## 2020-10-07 PROCEDURE — G8484 FLU IMMUNIZE NO ADMIN: HCPCS | Performed by: FAMILY MEDICINE

## 2020-10-07 NOTE — PROGRESS NOTES
CC   Chief Complaint   Patient presents with    Pre-op Exam     HPI:   Patient comes in today for the below issue(s). Pre-surgical evaluation:  Patient is here for pre-operative evaluate and risk stratification. Patient is here by request of Dr. Ari Hunt @ [facility] who has upcoming vitreal macular traction release on the right eye eye surgery scheduled with patient. Surgery scheduled for November 2 2020   [facility]. She has no complaints or concerns today. The patient states that the risks/benefits/likely post operative course has been explained by the performing surgeon and wishes to proceed. She is  generally healthy. Medical issues are identified below along with social and family history. The previously mentioned right upper quadrant/rib discomfort has lessened but not resolved. Work-up was unremarkable for definitive pathology. Hemangioma of the liver was again identified. Renal cysts were also seen. Aortic ectasia was also identified. None of which would be contributing to the discomfort she was describing. .  She is comfortable with continued observation. Review of Systems  Review of Systems   Constitutional: Positive for fatigue. Negative for chills, diaphoresis and fever. HENT: Negative for congestion and trouble swallowing. Eyes: Negative for pain and redness. Respiratory: Negative for shortness of breath and wheezing. Cardiovascular: Positive for leg swelling (Baseline). Negative for chest pain and palpitations. Gastrointestinal: Negative for abdominal pain. Genitourinary: Negative for difficulty urinating. Musculoskeletal: Positive for arthralgias. Negative for back pain. Skin: Negative for rash. Neurological: Negative for dizziness and headaches.           Past Medical History:   Diagnosis Date    Aortic ectasia ([facility] [address] 09/2020    3.7 cm a sending aorta    Arthritis     Basal cell cancer 1/2013 nose; Dr Nelson Wiley Coronary artery disease     bypass 2007; follows with Dr Alondra De León yearly    Diastolic dysfunction 6446    stage 3; follows with Dr. Alondra De León yearly    Dysphagia     Hyperlipidemia     Hypertension     Hypothyroid     Impaired glucose tolerance     Liver cyst     Lumbosacral radiculopathy at S1 12/2015    left sided    Macular degeneration 6/13    Renal cyst     seen by Dr Abhay Elizondo; benign    Vertigo 2/2015     Past Surgical History:   Procedure Laterality Date    APPENDECTOMY      CARDIAC SURGERY  2007    double bypass @ [facility] (Dr. Elli Knight)   [address]      right    COLONOSCOPY  2007    Dr Dorian Mullins      partial (ovaries remain)     Social History     Tobacco Use    Smoking status: Former Smoker    Smokeless tobacco: Never Used    Tobacco comment: quit in her late 19's   Substance Use Topics    Alcohol use: Yes     Comment: rare    Drug use: No     Family History   Problem Relation Age of Onset    Heart Disease Mother     Cancer Father         lung         PE:  VS:  /60   Pulse 51   Temp 97.4 °F (36.3 °C) (Temporal)   Resp 16   Ht 5' 3\" (1.6 m)   Wt 198 lb (89.8 kg)   LMP  (LMP Unknown)   SpO2 97%   BMI 35.07 kg/m²   Physical Exam  General: Well nourished, well developed, no acute distress  Eyes:  PERRL   Conjunctiva unremarkable   ENT:  TM's intact bilaterally, no effusion   Nasal:  No mucosal edema     No nasal drainage   Oral:  mucosa moist and pink             no posterior pharyngeal drainage     no posterior pharyngeal exudate  Neck:  Supple   No palpable cervical lymphoadenopathy   Thyroid without mass or enlargement  Resp: Lungs CTAB   Equal and adequate air exchange without accessory muscle use    No rales, rhonchi or wheeze  CV: S1S2 RRR   No murmur   Intact distal pulses   No edema  GI: Abdomen Soft, non tender, non distended   Normoactive bowel sounds   No palpable hepatomegaly  MS: Physiologic ROM of all extremities instructions on his/her After Visit Summary and given to patient at the end of visit. Patient and/or guardian given opportunity to ask questions/raise concerns. She verbalized comfort and understanding of instructions. Follow Up:     Return for keep next appt as scheduled or see sooner if neded. or sooner if the above issues change unexpectedly or new issues develop     This document was prepared at least partially through the use ofGiritechice recognition software. Although effort is taken to assure the accuracy of this document, it is possible that grammatical, syntax,  or spelling errors may occur.       Electronically signed by Maria A Wilson MD FAAFP

## 2020-10-09 ASSESSMENT — ENCOUNTER SYMPTOMS
SHORTNESS OF BREATH: 0
ABDOMINAL PAIN: 0
EYE PAIN: 0
BACK PAIN: 0
TROUBLE SWALLOWING: 0
WHEEZING: 0
EYE REDNESS: 0

## 2020-10-20 RX ORDER — ENALAPRIL MALEATE 20 MG/1
TABLET ORAL
Qty: 180 TABLET | Refills: 3 | Status: SHIPPED
Start: 2020-10-20 | End: 2021-08-30

## 2020-10-20 RX ORDER — NIFEDIPINE 30 MG/1
TABLET, EXTENDED RELEASE ORAL
Qty: 90 TABLET | Refills: 3 | Status: SHIPPED
Start: 2020-10-20 | End: 2021-08-30

## 2020-10-20 RX ORDER — OXYBUTYNIN CHLORIDE 5 MG/1
TABLET, EXTENDED RELEASE ORAL
Qty: 90 TABLET | Refills: 3 | Status: SHIPPED
Start: 2020-10-20 | End: 2021-08-30

## 2020-10-20 RX ORDER — ATORVASTATIN CALCIUM 20 MG/1
20 TABLET, FILM COATED ORAL DAILY
Qty: 90 TABLET | Refills: 3 | Status: SHIPPED
Start: 2020-10-20 | End: 2021-08-30

## 2020-10-20 RX ORDER — LEVOTHYROXINE SODIUM 0.05 MG/1
50 TABLET ORAL DAILY
Qty: 90 TABLET | Refills: 3 | Status: SHIPPED
Start: 2020-10-20 | End: 2021-08-30

## 2020-10-20 RX ORDER — FUROSEMIDE 20 MG/1
20 TABLET ORAL DAILY
Qty: 90 TABLET | Refills: 3 | Status: SHIPPED
Start: 2020-10-20 | End: 2021-08-30

## 2020-10-20 RX ORDER — CLONIDINE HYDROCHLORIDE 0.1 MG/1
TABLET ORAL
Qty: 180 TABLET | Refills: 3 | Status: SHIPPED
Start: 2020-10-20 | End: 2021-08-30

## 2020-10-26 RX ORDER — FAMOTIDINE 20 MG/1
TABLET, FILM COATED ORAL
Qty: 180 TABLET | Refills: 3 | Status: SHIPPED
Start: 2020-10-26 | End: 2020-12-16 | Stop reason: SDUPTHER

## 2020-10-29 ENCOUNTER — OFFICE VISIT (OUTPATIENT)
Dept: CARDIOLOGY CLINIC | Age: 85
End: 2020-10-29
Payer: MEDICARE

## 2020-10-29 VITALS
HEART RATE: 43 BPM | RESPIRATION RATE: 14 BRPM | HEIGHT: 63 IN | WEIGHT: 196.6 LBS | SYSTOLIC BLOOD PRESSURE: 124 MMHG | DIASTOLIC BLOOD PRESSURE: 60 MMHG | BODY MASS INDEX: 34.84 KG/M2

## 2020-10-29 PROCEDURE — 1123F ACP DISCUSS/DSCN MKR DOCD: CPT | Performed by: INTERNAL MEDICINE

## 2020-10-29 PROCEDURE — 99214 OFFICE O/P EST MOD 30 MIN: CPT | Performed by: INTERNAL MEDICINE

## 2020-10-29 PROCEDURE — G8400 PT W/DXA NO RESULTS DOC: HCPCS | Performed by: INTERNAL MEDICINE

## 2020-10-29 PROCEDURE — 4040F PNEUMOC VAC/ADMIN/RCVD: CPT | Performed by: INTERNAL MEDICINE

## 2020-10-29 PROCEDURE — 1036F TOBACCO NON-USER: CPT | Performed by: INTERNAL MEDICINE

## 2020-10-29 PROCEDURE — G8427 DOCREV CUR MEDS BY ELIG CLIN: HCPCS | Performed by: INTERNAL MEDICINE

## 2020-10-29 PROCEDURE — G8484 FLU IMMUNIZE NO ADMIN: HCPCS | Performed by: INTERNAL MEDICINE

## 2020-10-29 PROCEDURE — G8417 CALC BMI ABV UP PARAM F/U: HCPCS | Performed by: INTERNAL MEDICINE

## 2020-10-29 PROCEDURE — 93000 ELECTROCARDIOGRAM COMPLETE: CPT | Performed by: INTERNAL MEDICINE

## 2020-10-29 PROCEDURE — 1090F PRES/ABSN URINE INCON ASSESS: CPT | Performed by: INTERNAL MEDICINE

## 2020-10-29 NOTE — PROGRESS NOTES
OUTPATIENT CARDIOLOGY FOLLOW-UP    Name: Minerva Slater    Age: 82 y.o. Date of Service: 10/29/2020    Chief Complaint: Follow-up for chest pain, CAD    Referring Physician: Jewel Sagastume MD    History of Present Illness:   Ms. Creed Rinne is a 82 y.o. female who presented to [facility] on 9/4/19 for further evaluation of chest pain. Her PMH is outlined in detail below. She experienced an episode of chest pain at rest on 9/4/19 (mid-sternal, no radiation of the pain, no relationship to exertion, episode lasted 30-45 minutes, \"pressure\", pain subsided prior to coming to the hospital) --> she denies recent chest pain. She denies LOVE, palpitations, orthopnea, or syncope. She is currently with no active cardiac complaints at rest. SB on EKG.     Review of Systems:  Cardiac: As per HPI  General: No fever, chills  Pulmonary: As per HPI  HEENT: No visual disturbances, difficult swallowing  GI: No nausea, vomiting, abdominal pain  Endocrine: +hyopthyroidism, no DM  Musculoskeletal: CHASE x 4, no focal motor deficits  Skin: Intact, no rashes  Neuro/Psych: No headache or seizures    Past Medical History:  Past Medical History:   Diagnosis Date    Aortic ectasia (HCC) 09/2020    3.7 cm a sending aorta    Arthritis     Basal cell cancer 1/2013    nose; Dr Feng James Coronary artery disease     bypass 2007; follows with Dr Mukund Karimi yearly    Diastolic dysfunction 3337    stage 3; follows with Dr. Mukund Karimi yearly    Dysphagia     Hyperlipidemia     Hypertension     Hypothyroid     Impaired glucose tolerance     Liver cyst     Lumbosacral radiculopathy at S1 12/2015    left sided    Macular degeneration 6/13    Renal cyst     seen by Dr Yamilex Greenberg; benign    Vertigo 2/2015       Past Surgical History:  Past Surgical History:   Procedure Laterality Date    APPENDECTOMY      CARDIAC SURGERY  2007    double bypass @ [facility] (Dr. Jessie Vogel)   [address]      right    COLONOSCOPY  2007    Dr Nicolas Oneill partial (ovaries remain)     Family History:  Family History   Problem Relation Age of Onset    Heart Disease Mother    [facility] [place] Cancer Father         lung       Social History:  Social History     Socioeconomic History    Marital status:      Spouse name: Not on file    Number of children: Not on file    Years of education: Not on file    Highest education level: Not on file   Occupational History    Not on file   Social Needs    Financial resource strain: Not on file    Food insecurity     Worry: Not on file     Inability: Not on file    Transportation needs     Medical: Not on file     Non-medical: Not on file   Tobacco Use    Smoking status: Former Smoker    Smokeless tobacco: Never Used    Tobacco comment: quit in her late 19's   Substance and Sexual Activity    Alcohol use: Yes     Comment: rare    Drug use: No    Sexual activity: Not on file   Lifestyle    Physical activity     Days per week: Not on file     Minutes per session: Not on file    Stress: Not on file   Relationships    Social connections     Talks on phone: Not on file     Gets together: Not on file     Attends Latter day service: Not on file     Active member of club or organization: Not on file     Attends meetings of clubs or organizations: Not on file     Relationship status: Not on file    Intimate partner violence     Fear of current or ex partner: Not on file     Emotionally abused: Not on file     Physically abused: Not on file     Forced sexual activity: Not on file   Other Topics Concern    Not on file   Social History Narrative    Not on file     Allergies:  No Known Allergies    Current Medications:  Current Outpatient Medications   Medication Sig Dispense Refill    famotidine (PEPCID) 20 MG tablet TAKE 1 TABLET BY MOUTH  TWICE DAILY 180 tablet 3    oxybutynin (DITROPAN-XL) 5 MG extended release tablet TAKE 1 TABLET BY MOUTH  EVERY MORNING 90 tablet 3    furosemide (LASIX) 20 MG tablet TAKE 1 TABLET BY MOUTH  DAILY 90 tablet 3    atorvastatin (LIPITOR) 20 MG tablet TAKE 1 TABLET BY MOUTH  DAILY (Patient taking differently: Take 20 mg by mouth nightly ) 90 tablet 3    cloNIDine (CATAPRES) 0.1 MG tablet TAKE 1 TABLET BY MOUTH  TWICE DAILY (Patient taking differently: nightly TAKE 1 TABLET BY MOUTH  TWICE DAILY) 180 tablet 3    enalapril (VASOTEC) 20 MG tablet TAKE 1 TABLET BY MOUTH  TWICE DAILY 180 tablet 3    NIFEdipine (PROCARDIA XL) 30 MG extended release tablet TAKE 1 TABLET BY MOUTH  DAILY 90 tablet 3    levothyroxine (SYNTHROID) 50 MCG tablet TAKE 1 TABLET BY MOUTH  DAILY 90 tablet 3    gabapentin (NEURONTIN) 100 MG capsule TAKE 1 CAPSULE TWICE DAILY. 180 capsule 0    busPIRone (BUSPAR) 5 MG tablet TAKE 1 TABLET DAILY AS    NEEDED. 90 tablet 3    aspirin 81 MG tablet Take 1 tablet by mouth nightly       guanFACINE (TENEX) 1 MG tablet nightly       Multiple Vitamins-Minerals (PRESERVISION AREDS) TABS Take by mouth 2 times daily      nitroGLYCERIN (NITROSTAT) 0.4 MG SL tablet Place 1 tablet under the tongue every 5 minutes as needed for Chest pain 25 tablet 3    Biotin 03138 MCG TABS Take 1 tablet by mouth every morning       Calcium Polycarbophil (FIBER) 625 MG TABS Take 2 tablets by mouth every morning       vitamin B-12 (CYANOCOBALAMIN) 1000 MCG tablet Take 2,500 mcg by mouth every morning Instructed to hold 5 days pre-op 30 tablet 5     No current facility-administered medications for this visit.       Physical Exam:  /60   Pulse (!) 43   Resp 14   Ht 5' 3\" (1.6 m)   Wt 196 lb 9.6 oz (89.2 kg)   LMP  (LMP Unknown)   BMI 34.83 kg/m²   Wt Readings from Last 3 Encounters:   10/29/20 196 lb 9.6 oz (89.2 kg)   10/07/20 198 lb (89.8 kg)   09/08/20 197 lb (89.4 kg)     Appearance: Awake, alert, no acute respiratory distress  Skin: Intact, no rash  Head: Normocephalic, atraumatic  Eyes: EOMI, no conjunctival erythema  ENMT: No pharyngeal erythema, MMM, no rhinorrhea  Neck: Supple, no elevated JVP, no carotid bruits  Lungs: Clear to auscultation bilaterally. No wheezes, rales, or rhonchi. Cardiac: Bradycardic, regular rhythm, +S1S2, no murmurs apparent  Abdomen: Soft, nontender, +bowel sounds  Extremities: Moves all extremities x 4, trace lower extremity edema  Neurologic: No focal motor deficits apparent, normal mood and affect    Laboratory Tests:  Lab Results   Component Value Date    CREATININE 1.4 (H) 01/09/2020    BUN 20 01/09/2020     01/09/2020    K 4.2 01/09/2020     01/09/2020    CO2 24 01/09/2020     Lab Results   Component Value Date     (H) 04/15/2012     Lab Results   Component Value Date    WBC 5.9 01/09/2020    RBC 4.50 01/09/2020    HGB 12.9 01/09/2020    HCT 42.6 01/09/2020    MCV 94.7 01/09/2020    MCH 28.7 01/09/2020    MCHC 30.3 01/09/2020    RDW 14.3 01/09/2020     01/09/2020    MPV 12.2 01/09/2020     Lab Results   Component Value Date    MG 2.0 09/05/2019     Lab Results   Component Value Date    PROTIME 11.1 03/07/2016    PROTIME 11.1 04/15/2012    INR 1.0 03/07/2016     Lab Results   Component Value Date    TSH 2.730 09/09/2020     Lab Results   Component Value Date    TRIG 119 09/09/2020    TRIG 99 01/09/2020    TRIG 78 07/09/2019     Lab Results   Component Value Date    HDL 48 09/09/2020    HDL 54 01/09/2020    HDL 59 07/09/2019     Lab Results   Component Value Date    LDLCALC 54 09/09/2020    LDLCALC 57 01/09/2020    LDLCALC 68 07/09/2019     Lab Results   Component Value Date    MG 2.0 09/05/2019     Lab Results   Component Value Date    CKTOTAL 41 03/08/2016    CKMB 1.5 09/05/2019    TROPONINI <0.01 09/05/2019     Cardiac Tests:  ECG: SB, rate 43, NSSTT changes    Telemetry (9/2019): SB, rate 50's    February 2015 echocardiogram: EF 60%, normal RV function, stage 2 DD, mild valve disease    Echocardiogram: 3/8/16 (read by Dr. Monet Lopez)  Normal left ventricle size and systolic function  Ejection fraction is visually estimated at 65%.   Mild left ventricular concentric hypertrophy noted. The left atrium is mildly dilated. Mild mitral regurgitation is present. Mild tricuspid regurgitation. [place] nuclear stress test: 3/8/16  1. No evidence of pharmacologic stress-induced myocardial   ischemia. 2. No focal wall motion abnormalities are demonstrated. 3. The left ventricular ejection fraction is 68%. [place] nuclear stress test: 1/9/18  1. No reversible perfusion defect   2. Ejection fraction is 62 %. 3. No significant wall motion abnormality     Impression/Plan:  1. Chest pain -- currently CP free, negative troponin x 2 in 9/2019, negative Lexiscan nuclear stress test on 1/9/18  2. CAD/MI s/p CABG in 2007 (LIMA-LAD and SVG-LCx. Negative stress test > 3 years ago per patient and negative Lexiscan nuclear stress test on 3/8/16 and 1/9/18. 3. HTN -- BP today 124/60 (BP at last office visit 104/64); on multiple antihypertensive agents  4. HLD -- on statin  5. Diastolic dysfunction   6. Hypothyroidism -- normal TSH in 9/2017 and 7/2019  7. CKD  8.  History of sinus bradycardia -- coreg dose decreased to 3.125 mg BID in 11/2016 and then subsequently discontinued, previously wore cardiac monitor    - Results of prior cardiac testing outlined above / discussed options for further CAD work-up (including cardiac catheterization) pending clinical course -- decision made to continue with close monitoring  - Continue current medications (coreg previously discontinued) / again discussed option of adding imdur (she defers unless chest pain occurs more frequently)  - Continue home BP monitoring     Della Santana MD  [facility]) Cardiology

## 2020-11-02 ENCOUNTER — ANESTHESIA EVENT (OUTPATIENT)
Dept: OPERATING ROOM | Age: 85
End: 2020-11-02
Payer: MEDICARE

## 2020-11-02 ENCOUNTER — ANESTHESIA (OUTPATIENT)
Dept: OPERATING ROOM | Age: 85
End: 2020-11-02
Payer: MEDICARE

## 2020-11-02 ENCOUNTER — HOSPITAL ENCOUNTER (OUTPATIENT)
Age: 85
Setting detail: OUTPATIENT SURGERY
Discharge: HOME | End: 2020-11-02
Attending: OPHTHALMOLOGY | Admitting: OPHTHALMOLOGY
Payer: MEDICARE

## 2020-11-02 VITALS
SYSTOLIC BLOOD PRESSURE: 170 MMHG | DIASTOLIC BLOOD PRESSURE: 74 MMHG | OXYGEN SATURATION: 96 % | HEART RATE: 59 BPM | HEIGHT: 63 IN | RESPIRATION RATE: 17 BRPM | BODY MASS INDEX: 35.08 KG/M2 | TEMPERATURE: 97.1 F | WEIGHT: 198 LBS

## 2020-11-02 VITALS
RESPIRATION RATE: 22 BRPM | SYSTOLIC BLOOD PRESSURE: 162 MMHG | OXYGEN SATURATION: 95 % | DIASTOLIC BLOOD PRESSURE: 67 MMHG

## 2020-11-02 PROBLEM — H35.371 EPIRETINAL MEMBRANE, RIGHT EYE: Chronic | Status: ACTIVE | Noted: 2020-11-02

## 2020-11-02 PROBLEM — H43.821 VITREOMACULAR TRACTION SYNDROME OF RIGHT EYE: Status: ACTIVE | Noted: 2020-11-02

## 2020-11-02 PROCEDURE — 6370000000 HC RX 637 (ALT 250 FOR IP): Performed by: OPHTHALMOLOGY

## 2020-11-02 PROCEDURE — 6360000002 HC RX W HCPCS: Performed by: NURSE ANESTHETIST, CERTIFIED REGISTERED

## 2020-11-02 PROCEDURE — 3700000001 HC ADD 15 MINUTES (ANESTHESIA): Performed by: OPHTHALMOLOGY

## 2020-11-02 PROCEDURE — 3600000003 HC SURGERY LEVEL 3 BASE: Performed by: OPHTHALMOLOGY

## 2020-11-02 PROCEDURE — 2500000003 HC RX 250 WO HCPCS: Performed by: OPHTHALMOLOGY

## 2020-11-02 PROCEDURE — 3600000013 HC SURGERY LEVEL 3 ADDTL 15MIN: Performed by: OPHTHALMOLOGY

## 2020-11-02 PROCEDURE — 2580000003 HC RX 258: Performed by: NURSE ANESTHETIST, CERTIFIED REGISTERED

## 2020-11-02 PROCEDURE — 2500000003 HC RX 250 WO HCPCS: Performed by: NURSE ANESTHETIST, CERTIFIED REGISTERED

## 2020-11-02 PROCEDURE — 2709999900 HC NON-CHARGEABLE SUPPLY: Performed by: OPHTHALMOLOGY

## 2020-11-02 PROCEDURE — 3700000000 HC ANESTHESIA ATTENDED CARE: Performed by: OPHTHALMOLOGY

## 2020-11-02 PROCEDURE — 2720000010 HC SURG SUPPLY STERILE: Performed by: OPHTHALMOLOGY

## 2020-11-02 PROCEDURE — 7100000010 HC PHASE II RECOVERY - FIRST 15 MIN: Performed by: OPHTHALMOLOGY

## 2020-11-02 PROCEDURE — 7100000011 HC PHASE II RECOVERY - ADDTL 15 MIN: Performed by: OPHTHALMOLOGY

## 2020-11-02 PROCEDURE — 6360000002 HC RX W HCPCS: Performed by: OPHTHALMOLOGY

## 2020-11-02 RX ORDER — SODIUM CHLORIDE 0.9 % (FLUSH) 0.9 %
10 SYRINGE (ML) INJECTION EVERY 12 HOURS SCHEDULED
Status: DISCONTINUED | OUTPATIENT
Start: 2020-11-02 | End: 2020-11-02 | Stop reason: HOSPADM

## 2020-11-02 RX ORDER — PHENYLEPHRINE HYDROCHLORIDE 100 MG/ML
1 SOLUTION/ DROPS OPHTHALMIC PRN
Status: COMPLETED | OUTPATIENT
Start: 2020-11-02 | End: 2020-11-02

## 2020-11-02 RX ORDER — SODIUM CHLORIDE, SODIUM LACTATE, POTASSIUM CHLORIDE, CALCIUM CHLORIDE 600; 310; 30; 20 MG/100ML; MG/100ML; MG/100ML; MG/100ML
INJECTION, SOLUTION INTRAVENOUS CONTINUOUS
Status: DISCONTINUED | OUTPATIENT
Start: 2020-11-02 | End: 2020-11-02 | Stop reason: HOSPADM

## 2020-11-02 RX ORDER — SODIUM CHLORIDE 0.9 % (FLUSH) 0.9 %
10 SYRINGE (ML) INJECTION PRN
Status: DISCONTINUED | OUTPATIENT
Start: 2020-11-02 | End: 2020-11-02 | Stop reason: HOSPADM

## 2020-11-02 RX ORDER — LIDOCAINE HYDROCHLORIDE 10 MG/ML
INJECTION, SOLUTION EPIDURAL; INFILTRATION; INTRACAUDAL; PERINEURAL PRN
Status: DISCONTINUED | OUTPATIENT
Start: 2020-11-02 | End: 2020-11-02 | Stop reason: SDUPTHER

## 2020-11-02 RX ORDER — ONDANSETRON 2 MG/ML
INJECTION INTRAMUSCULAR; INTRAVENOUS PRN
Status: DISCONTINUED | OUTPATIENT
Start: 2020-11-02 | End: 2020-11-02 | Stop reason: SDUPTHER

## 2020-11-02 RX ORDER — TOBRAMYCIN AND DEXAMETHASONE 3; 1 MG/ML; MG/ML
1 SUSPENSION/ DROPS OPHTHALMIC PRN
Status: COMPLETED | OUTPATIENT
Start: 2020-11-02 | End: 2020-11-02

## 2020-11-02 RX ORDER — FLURBIPROFEN SODIUM 0.3 MG/ML
1 SOLUTION/ DROPS OPHTHALMIC PRN
Status: COMPLETED | OUTPATIENT
Start: 2020-11-02 | End: 2020-11-02

## 2020-11-02 RX ORDER — GLYCOPYRROLATE 1 MG/5 ML
SYRINGE (ML) INTRAVENOUS PRN
Status: DISCONTINUED | OUTPATIENT
Start: 2020-11-02 | End: 2020-11-02 | Stop reason: SDUPTHER

## 2020-11-02 RX ORDER — BALANCED SALT SOLUTION ENRICHED WITH BICARBONATE, DEXTROSE, AND GLUTATHIONE
KIT INTRAOCULAR PRN
Status: DISCONTINUED | OUTPATIENT
Start: 2020-11-02 | End: 2020-11-02 | Stop reason: ALTCHOICE

## 2020-11-02 RX ORDER — CEFAZOLIN SODIUM 1 G/3ML
INJECTION, POWDER, FOR SOLUTION INTRAMUSCULAR; INTRAVENOUS PRN
Status: DISCONTINUED | OUTPATIENT
Start: 2020-11-02 | End: 2020-11-02 | Stop reason: ALTCHOICE

## 2020-11-02 RX ORDER — DEXAMETHASONE SODIUM PHOSPHATE 10 MG/ML
INJECTION, SOLUTION INTRAMUSCULAR; INTRAVENOUS PRN
Status: DISCONTINUED | OUTPATIENT
Start: 2020-11-02 | End: 2020-11-02 | Stop reason: ALTCHOICE

## 2020-11-02 RX ORDER — DEXAMETHASONE SODIUM PHOSPHATE 4 MG/ML
INJECTION, SOLUTION INTRA-ARTICULAR; INTRALESIONAL; INTRAMUSCULAR; INTRAVENOUS; SOFT TISSUE PRN
Status: DISCONTINUED | OUTPATIENT
Start: 2020-11-02 | End: 2020-11-02 | Stop reason: SDUPTHER

## 2020-11-02 RX ORDER — SODIUM CHLORIDE 9 MG/ML
INJECTION, SOLUTION INTRAVENOUS CONTINUOUS PRN
Status: DISCONTINUED | OUTPATIENT
Start: 2020-11-02 | End: 2020-11-02 | Stop reason: SDUPTHER

## 2020-11-02 RX ORDER — ATROPINE SULFATE 10 MG/ML
1 SOLUTION/ DROPS OPHTHALMIC PRN
Status: DISCONTINUED | OUTPATIENT
Start: 2020-11-02 | End: 2020-11-02 | Stop reason: HOSPADM

## 2020-11-02 RX ORDER — ATROPINE SULFATE 10 MG/ML
SOLUTION/ DROPS OPHTHALMIC PRN
Status: DISCONTINUED | OUTPATIENT
Start: 2020-11-02 | End: 2020-11-02 | Stop reason: ALTCHOICE

## 2020-11-02 RX ORDER — PROPOFOL 10 MG/ML
INJECTION, EMULSION INTRAVENOUS PRN
Status: DISCONTINUED | OUTPATIENT
Start: 2020-11-02 | End: 2020-11-02 | Stop reason: SDUPTHER

## 2020-11-02 RX ADMIN — DEXAMETHASONE SODIUM PHOSPHATE 10 MG: 4 INJECTION, SOLUTION INTRAMUSCULAR; INTRAVENOUS at 13:44

## 2020-11-02 RX ADMIN — TOBRAMYCIN AND DEXAMETHASONE 1 DROP: 3; 1 SUSPENSION/ DROPS OPHTHALMIC at 12:15

## 2020-11-02 RX ADMIN — PROPOFOL 50 MG: 10 INJECTION, EMULSION INTRAVENOUS at 13:38

## 2020-11-02 RX ADMIN — Medication 1 DROP: at 12:20

## 2020-11-02 RX ADMIN — LIDOCAINE HYDROCHLORIDE 20 MG: 10 INJECTION, SOLUTION EPIDURAL; INFILTRATION; INTRACAUDAL; PERINEURAL at 13:38

## 2020-11-02 RX ADMIN — SODIUM CHLORIDE: 9 INJECTION, SOLUTION INTRAVENOUS at 13:35

## 2020-11-02 RX ADMIN — Medication 1 DROP: at 12:10

## 2020-11-02 RX ADMIN — TOBRAMYCIN AND DEXAMETHASONE 1 DROP: 3; 1 SUSPENSION/ DROPS OPHTHALMIC at 12:10

## 2020-11-02 RX ADMIN — Medication 0.2 MG: at 13:39

## 2020-11-02 RX ADMIN — ONDANSETRON 4 MG: 2 INJECTION INTRAMUSCULAR; INTRAVENOUS at 13:44

## 2020-11-02 RX ADMIN — TOBRAMYCIN AND DEXAMETHASONE 1 DROP: 3; 1 SUSPENSION/ DROPS OPHTHALMIC at 12:20

## 2020-11-02 RX ADMIN — Medication 1 DROP: at 12:15

## 2020-11-02 RX ADMIN — ATROPINE SULFATE 1 DROP: 10 SOLUTION/ DROPS OPHTHALMIC at 12:10

## 2020-11-02 RX ADMIN — PHENYLEPHRINE HYDROCHLORIDE 1 DROP: 100 SOLUTION/ DROPS OPHTHALMIC at 12:10

## 2020-11-02 RX ADMIN — PHENYLEPHRINE HYDROCHLORIDE 1 DROP: 100 SOLUTION/ DROPS OPHTHALMIC at 12:20

## 2020-11-02 RX ADMIN — PHENYLEPHRINE HYDROCHLORIDE 1 DROP: 100 SOLUTION/ DROPS OPHTHALMIC at 12:15

## 2020-11-02 ASSESSMENT — PAIN - FUNCTIONAL ASSESSMENT: PAIN_FUNCTIONAL_ASSESSMENT: 0-10

## 2020-11-02 NOTE — ANESTHESIA PRE PROCEDURE
Department of Anesthesiology  Preprocedure Note       Name:  Donovan Larson   Age:  82 y.o.  :  1935                                          MRN:  [id number]         Date:  2020      Surgeon: Roxanne Sweet):  Alexander Champagne MD    Procedure: Procedure(s):  RIGHT EYE PARS PLANA  VITRECTOMY 25 GAUGE    Medications prior to admission:   Prior to Admission medications    Medication Sig Start Date End Date Taking? Authorizing Provider   famotidine (PEPCID) 20 MG tablet TAKE 1 TABLET BY MOUTH  TWICE DAILY 10/26/20  Yes Maria Elena Middleton MD   oxybutynin (DITROPAN-XL) 5 MG extended release tablet TAKE 1 TABLET BY MOUTH  EVERY MORNING 10/20/20  Yes Maria Elena Middleton MD   furosemide (LASIX) 20 MG tablet TAKE 1 TABLET BY MOUTH  DAILY 10/20/20  Yes Maria Elena Middleton MD   atorvastatin (LIPITOR) 20 MG tablet TAKE 1 TABLET BY MOUTH  DAILY  Patient taking differently: Take 20 mg by mouth nightly  10/20/20  Yes Maria Elena Middleton MD   cloNIDine (CATAPRES) 0.1 MG tablet TAKE 1 TABLET BY MOUTH  TWICE DAILY  Patient taking differently: nightly TAKE 1 TABLET BY MOUTH  TWICE DAILY 10/20/20  Yes Maria Elena Middleton MD   enalapril (VASOTEC) 20 MG tablet TAKE 1 TABLET BY MOUTH  TWICE DAILY 10/20/20  Yes Maria Elena Middleton MD   NIFEdipine (PROCARDIA XL) 30 MG extended release tablet TAKE 1 TABLET BY MOUTH  DAILY 10/20/20  Yes Maria Elena Middleton MD   levothyroxine (SYNTHROID) 50 MCG tablet TAKE 1 TABLET BY MOUTH  DAILY 10/20/20  Yes Maria Elena Middleton MD   gabapentin (NEURONTIN) 100 MG capsule TAKE 1 CAPSULE TWICE DAILY.  3/13/20 10/29/20 Yes Maria Elena Middleton MD   busPIRone (BUSPAR) 5 MG tablet TAKE 1 TABLET DAILY AS    NEEDED. 20  Yes Maria Elena Middleton MD   aspirin 81 MG tablet Take 1 tablet by mouth nightly  10/14/08  Yes Historical Provider, MD   guanFACINE (TENEX) 1 MG tablet nightly  6/10/18  Yes Historical Provider, MD   Multiple Vitamins-Minerals (PRESERVISION AREDS) TABS Take by mouth 2 times daily   Yes Historical Provider, MD   Biotin 14396 MCG TABS Take 1 tablet by mouth every morning    Yes Historical Provider, MD   Calcium Polycarbophil (FIBER) 625 MG TABS Take 2 tablets by mouth every morning    Yes Historical Provider, MD   vitamin B-12 (CYANOCOBALAMIN) 1000 MCG tablet Take 2,500 mcg by mouth every morning Instructed to hold 5 days pre-op 1/9/18  Yes Edgardo Olson MD   nitroGLYCERIN (NITROSTAT) 0.4 MG SL tablet Place 1 tablet under the tongue every 5 minutes as needed for Chest pain 3/29/18   Edgardo Olson MD       Current medications:    Current Facility-Administered Medications   Medication Dose Route Frequency Provider Last Rate Last Dose    lactated ringers infusion   Intravenous Continuous Alyssia Fraire MD        sodium chloride flush 0.9 % injection 10 mL  10 mL Intravenous 2 times per day Alyssia Fraire MD        sodium chloride flush 0.9 % injection 10 mL  10 mL Intravenous PRN Alyssia Fraire MD        atropine 1 % ophthalmic solution 1 drop  1 drop Right Eye PRN Alyssia Fraire MD   1 drop at 11/02/20 1210       Allergies:  No Known Allergies    Problem List:    Patient Active Problem List   Diagnosis Code    Renal cyst N28.1    Coronary artery disease involving native coronary artery of native heart I25.10    Hyperlipidemia E78.5    Essential hypertension I10    Neuropathy of lower extremity G57.90    CKD (chronic kidney disease) stage 3, GFR 30-59 ml/min N18.30    Malignant basal cell neoplasm of skin C44.91    Paresthesia of foot R20.2    Bradycardia on ECG R00.1    Paresthesia of left leg R03.2    Diastolic dysfunction E30.39    Hypothyroid E03.9    Vertigo R42    Pre-syncope R55    Hx of CABG Z95.1    Lumbosacral radiculopathy at S1 M54.17    Bradycardia R00.1    Abnormal EKG R94.31    Pharyngoesophageal dysphagia R13.14    Precordial pain R07.2    Hyperparathyroidism ([address] E21.3    Aortic ectasia ([address] I77.819       Past Medical History:        Diagnosis Date  Aortic ectasia (HCC) 09/2020    3.7 cm a sending aorta; ct reviewed per Dr Samira Jaramillo cell cancer 1/2013    nose; Dr Helen Whitney Coronary artery disease     bypass 2007; follows with Dr Tierra Le yearly    Diastolic dysfunction 6570    stage 3; follows with Dr. Tierra Le yearly    Dysphagia     Hyperlipidemia     Hypertension     Hypothyroid     Impaired glucose tolerance     Liver cyst     Lumbosacral radiculopathy at S1 12/2015    left sided    Macular degeneration 6/13    Renal cyst     seen by Dr Lala Vallejo; benign    Vertigo 2/2015       Past Surgical History:        Procedure Laterality Date    APPENDECTOMY      CARDIAC SURGERY  2007    double bypass @ [facility] (Dr. Vamshi Torrez)   [address]      right    COLONOSCOPY  2007    Dr Magalie Emerson      partial (ovaries remain)       Social History:    Social History     Tobacco Use    Smoking status: Former Smoker    Smokeless tobacco: Never Used    Tobacco comment: quit in her late 19's   Substance Use Topics    Alcohol use: Yes     Comment: rare                                Counseling given: Not Answered  Comment: quit in her late 19's      Vital Signs (Current):   Vitals:    10/27/20 1515 11/02/20 1209   BP:  (!) 156/70   Pulse:  (!) 46   Resp:  14   Temp:  97.1 °F (36.2 °C)   TempSrc:  Temporal   SpO2:  97%   Weight: 198 lb (89.8 kg) 198 lb (89.8 kg)   Height: 5' 3\" (1.6 m) 5' 3\" (1.6 m)                                              BP Readings from Last 3 Encounters:   11/02/20 (!) 156/70   10/29/20 124/60   10/07/20 120/60       NPO Status: Time of last liquid consumption: 2359                        Time of last solid consumption: 1800                        Date of last liquid consumption: 11/01/20                        Date of last solid food consumption: 11/01/20    BMI:   Wt Readings from Last 3 Encounters:   11/02/20 198 lb (89.8 kg)   10/29/20 196 lb 9.6 oz (89.2 kg)   10/07/20 198 lb (89.8 kg)     Body mass index is 35.07 kg/m². CBC:   Lab Results   Component Value Date    WBC 5.9 01/09/2020    RBC 4.50 01/09/2020    HGB 12.9 01/09/2020    HCT 42.6 01/09/2020    MCV 94.7 01/09/2020    RDW 14.3 01/09/2020     01/09/2020       CMP:   Lab Results   Component Value Date     01/09/2020    K 4.2 01/09/2020    K 3.7 09/05/2019     01/09/2020    CO2 24 01/09/2020    BUN 20 01/09/2020    CREATININE 1.4 01/09/2020    GFRAA 43 01/09/2020    LABGLOM 36 01/09/2020    GLUCOSE 118 01/09/2020    GLUCOSE 113 04/15/2012    PROT 6.8 01/09/2020    CALCIUM 9.9 01/09/2020    BILITOT 0.4 01/09/2020    ALKPHOS 152 01/09/2020    AST 15 01/09/2020    ALT 12 01/09/2020       POC Tests: No results for input(s): POCGLU, POCNA, POCK, POCCL, POCBUN, POCHEMO, POCHCT in the last 72 hours. Coags:   Lab Results   Component Value Date    PROTIME 11.1 03/07/2016    PROTIME 11.1 04/15/2012    INR 1.0 03/07/2016    APTT 29.6 03/07/2016       HCG (If Applicable): No results found for: PREGTESTUR, PREGSERUM, HCG, HCGQUANT     ABGs: No results found for: PHART, PO2ART, NON7PZH, URA5HVB, BEART, P9LVWPXH     Type & Screen (If Applicable):  No results found for: LABABO, LABRH    Drug/Infectious Status (If Applicable):  No results found for: HIV, HEPCAB    COVID-19 Screening (If Applicable): No results found for: COVID19      Anesthesia Evaluation  Patient summary reviewed no history of anesthetic complications:   Airway: Mallampati: III  TM distance: <3 FB   Neck ROM: full  Mouth opening: > = 3 FB Dental: normal exam         Pulmonary: breath sounds clear to auscultation                             Cardiovascular:    (+) hypertension:, CAD:, CABG/stent:, hyperlipidemia        Rhythm: regular             Beta Blocker:  Not on Beta Blocker         Neuro/Psych:   (+) neuromuscular disease:, depression/anxiety             GI/Hepatic/Renal:   (+) liver disease (liver cyst):,          ROS comment: Dysphagia .    Endo/Other: (+) hypothyroidism: arthritis:., .                 Abdominal:           Vascular: negative vascular ROS. Anesthesia Plan      MAC     ASA 3       Induction: intravenous. Anesthetic plan and risks discussed with patient. Plan discussed with CRNA.             304 Jeff Dolan DO   11/2/2020

## 2020-11-02 NOTE — PROGRESS NOTES
Patient discharged home with son, instructions and follow up information provided, they verbalize understanding of discharge instructions and have no further questions at this time.
Per COVID testing guidelines, pt does not require COVID test prior to procedure.
survey after surgery we would greatly appreciate your comments    [] Parent/guardian of a minor must accompany their child and remain on the premises  the entire time they are under our care     [] Pediatric patients may bring favorite toy, blanket or comfort item with them    [] A caregiver or family member must remain with the patient during their stay if they are mentally handicapped, have dementia, disoriented or unable to use a call light or would be a safety concern if left unattended    [x] Please notify surgeon if you develop any illness between now and time of surgery (cold, cough, sore throat, fever, nausea, vomiting) or any signs of infections  including skin, wounds, and dental.    [] Other instructions    EDUCATIONAL MATERIALS PROVIDED:    [] PAT Preoperative Education Packet/Booklet     [] Medication List    [] Fluoroscopy Information Pamphlet    [] Transfusion bracelet applied with instructions    [] Joint replacement video reviewed    [] Shower with antibacterial soap and use CHG wipes provided the evening before surgery as instructed

## 2020-11-02 NOTE — BRIEF OP NOTE
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

## 2020-11-02 NOTE — ANESTHESIA POSTPROCEDURE EVALUATION
Department of Anesthesiology  Postprocedure Note    Patient: Yolanda Peraza  MRN: [id number]  YOB: 1935  Date of evaluation: 11/2/2020  Time:  3:36 PM     Procedure Summary     Date:  11/02/20 Room / Location:  [facility] OR [address] [place] [address]    Anesthesia Start:  1005 Anesthesia Stop:  6139    Procedure:  RIGHT EYE PARS PLANA  VITRECTOMY 25 GAUGE (Right Eye) Diagnosis:  (VITREAL MACULAR TRACTION RIGHT EYE)    Surgeon:  Bekah Palmer MD Responsible Provider:  Grant Sharif DO    Anesthesia Type:  MAC ASA Status:  3          Anesthesia Type: No value filed. Jailene Phase I: Jailene Score: 10    Jailene Phase II: Jailene Score: 10    Last vitals: Reviewed and per EMR flowsheets.        Anesthesia Post Evaluation    Patient location during evaluation: PACU  Patient participation: complete - patient participated  Level of consciousness: awake and alert  Airway patency: patent  Nausea & Vomiting: no nausea and no vomiting  Complications: no  Cardiovascular status: hemodynamically stable  Respiratory status: acceptable  Hydration status: euvolemic

## 2020-11-03 NOTE — OP NOTE
[id number] [address]                                OPERATIVE REPORT    PATIENT NAME: Mallie Jeans                     :        1935  MED REC NO:   [id number]                            ROOM:  ACCOUNT NO:   [de-identified]                           ADMIT DATE: 2020  PROVIDER:     Germaine Harp MD    DATE OF PROCEDURE:  2020    SURGEON:  Germaine Harp MD    ASSISTANT:  Alana Stafford. PREOPERATIVE DIAGNOSIS:  Vitreomacular traction - epiretinal membrane on  the right eye. POSTOPERATIVE DIAGNOSIS:  Vitreomacular traction - epiretinal membrane  on the right eye. PROCEDURE PERFORMED:  25-G vitrectomy - epiretinal membrane peel. ANESTHESIA:  MAC.    ESTIMATED BLOOD LOSS:  None. COMPLICATIONS:  None. DESCRIPTION OF PROCEDURE:  After informed consent was obtained and  suitable preoperative medication was administered, the patient was  brought into the operating room and put in the usual supine position. A  1:1 mixture of lidocaine with Marcaine was administered in a retrobulbar  manner. The right eye was then prepped and draped in the usual sterile  fashion for intraocular surgery. 5% povidone-iodine was instilled in  the fornix. A 25-G infusion cannula was secured to the globe  inferotemporally 3 mm posterior to the limbus. After its correct  position was confirmed in the vitreous cavity, the infusion cannula was  opened. Two additional sclerotomy trocars were secured to the globe 3  mm posterior to the limbus superotemporally and superonasally. Pars  plana vitrectomy was performed. Intravitreal Kenalog was injected. The  posterior hyaloid was particularly adherent around the optic disc, and  it was elevated anterior to the equator. Pars plana vitrectomy was  performed, and the vitreous was shaved with the assistance of scleral  depression. No retinal defects were identified. All three sclerotomy  cannulas were removed, and all wounds were found to be watertight. Subconjunctival antibiotics and steroids were injected. TobraDex drops  and atropine ointment were placed in the eye. The eye was covered with  a sterile eye patch and a Conroy shield. There were no intraoperative  complications. The patient was brought into the recovery area in stable  condition. DISCHARGE NOTE:  The patient was discharged home in stable condition. Instructions were given to return to the office the next day for postop  care.         Jenny Healy MD    D: 11/02/2020 14:45:22       T: 11/02/2020 14:52:01     [email or web address]  Job#: [id number]     Doc#: [id number]    CC:

## 2020-11-24 ENCOUNTER — TELEPHONE (OUTPATIENT)
Dept: FAMILY MEDICINE CLINIC | Age: 85
End: 2020-11-24

## 2020-12-01 ENCOUNTER — TELEPHONE (OUTPATIENT)
Dept: FAMILY MEDICINE CLINIC | Age: 85
End: 2020-12-01

## 2020-12-01 NOTE — TELEPHONE ENCOUNTER
Patient has lose of taste and smell. Says this is not new as she has this when she has sinus issues. She describes them as mild. Her quarantine will be up on 12/5 according to the health department from exposure to son who was covid positive. She is wondering if she should be tested and where or when. There is an order in her chart from 10/7. Please advise.

## 2020-12-07 DIAGNOSIS — Z01.818 PRE-OP EXAM: ICD-10-CM

## 2020-12-07 DIAGNOSIS — Z01.812 ENCOUNTER FOR PREOPERATIVE SCREENING LABORATORY TESTING FOR COVID-19 VIRUS: ICD-10-CM

## 2020-12-07 DIAGNOSIS — Z20.822 ENCOUNTER FOR PREOPERATIVE SCREENING LABORATORY TESTING FOR COVID-19 VIRUS: ICD-10-CM

## 2020-12-08 LAB
SARS-COV-2: NOT DETECTED
SOURCE: NORMAL

## 2020-12-16 ENCOUNTER — PATIENT MESSAGE (OUTPATIENT)
Dept: FAMILY MEDICINE CLINIC | Age: 85
End: 2020-12-16

## 2020-12-16 RX ORDER — FAMOTIDINE 20 MG/1
TABLET, FILM COATED ORAL
Qty: 180 TABLET | Refills: 3 | Status: SHIPPED
Start: 2020-12-16 | End: 2021-11-22

## 2020-12-16 NOTE — TELEPHONE ENCOUNTER
From: Christie Leonardo  To: Christophe Barker MD  Sent: 12/16/2020 12:50 PM EST  Subject: Prescription Question    Would you please send a prescription for Famotidine to [facility]? [facility] is out of stock and they don't know when they will be getting more. Thank you very much, appreciate it.    Kathy Mendes

## 2021-01-20 ENCOUNTER — IMMUNIZATION (OUTPATIENT)
Dept: PRIMARY CARE CLINIC | Age: 86
End: 2021-01-20
Payer: MEDICARE

## 2021-01-20 DIAGNOSIS — Z23 NEED FOR VACCINATION: Primary | ICD-10-CM

## 2021-01-20 PROCEDURE — 91300 COVID-19, PFIZER VACCINE 30MCG/0.3ML DOSE: CPT | Performed by: NURSE PRACTITIONER

## 2021-01-20 PROCEDURE — 0001A COVID-19, PFIZER VACCINE 30MCG/0.3ML DOSE: CPT | Performed by: NURSE PRACTITIONER

## 2021-01-22 ENCOUNTER — OFFICE VISIT (OUTPATIENT)
Dept: FAMILY MEDICINE CLINIC | Age: 86
End: 2021-01-22
Payer: MEDICARE

## 2021-01-22 VITALS
HEIGHT: 63 IN | SYSTOLIC BLOOD PRESSURE: 139 MMHG | OXYGEN SATURATION: 97 % | RESPIRATION RATE: 16 BRPM | TEMPERATURE: 97.7 F | DIASTOLIC BLOOD PRESSURE: 62 MMHG | HEART RATE: 46 BPM | WEIGHT: 191 LBS | BODY MASS INDEX: 33.84 KG/M2

## 2021-01-22 DIAGNOSIS — E78.2 MIXED HYPERLIPIDEMIA: ICD-10-CM

## 2021-01-22 DIAGNOSIS — N18.30 STAGE 3 CHRONIC KIDNEY DISEASE, UNSPECIFIED WHETHER STAGE 3A OR 3B CKD (HCC): ICD-10-CM

## 2021-01-22 DIAGNOSIS — E21.3 HYPERPARATHYROIDISM (HCC): ICD-10-CM

## 2021-01-22 DIAGNOSIS — I77.819 AORTIC ECTASIA (HCC): ICD-10-CM

## 2021-01-22 DIAGNOSIS — E03.9 ACQUIRED HYPOTHYROIDISM: ICD-10-CM

## 2021-01-22 DIAGNOSIS — I10 BENIGN ESSENTIAL HYPERTENSION: ICD-10-CM

## 2021-01-22 DIAGNOSIS — E78.2 MIXED HYPERLIPIDEMIA: Primary | ICD-10-CM

## 2021-01-22 PROCEDURE — 4040F PNEUMOC VAC/ADMIN/RCVD: CPT | Performed by: FAMILY MEDICINE

## 2021-01-22 PROCEDURE — G8427 DOCREV CUR MEDS BY ELIG CLIN: HCPCS | Performed by: FAMILY MEDICINE

## 2021-01-22 PROCEDURE — 1123F ACP DISCUSS/DSCN MKR DOCD: CPT | Performed by: FAMILY MEDICINE

## 2021-01-22 PROCEDURE — 1036F TOBACCO NON-USER: CPT | Performed by: FAMILY MEDICINE

## 2021-01-22 PROCEDURE — G8417 CALC BMI ABV UP PARAM F/U: HCPCS | Performed by: FAMILY MEDICINE

## 2021-01-22 PROCEDURE — G8484 FLU IMMUNIZE NO ADMIN: HCPCS | Performed by: FAMILY MEDICINE

## 2021-01-22 PROCEDURE — G8510 SCR DEP NEG, NO PLAN REQD: HCPCS | Performed by: FAMILY MEDICINE

## 2021-01-22 PROCEDURE — 36415 COLL VENOUS BLD VENIPUNCTURE: CPT | Performed by: FAMILY MEDICINE

## 2021-01-22 PROCEDURE — 1090F PRES/ABSN URINE INCON ASSESS: CPT | Performed by: FAMILY MEDICINE

## 2021-01-22 PROCEDURE — G8400 PT W/DXA NO RESULTS DOC: HCPCS | Performed by: FAMILY MEDICINE

## 2021-01-22 PROCEDURE — 99214 OFFICE O/P EST MOD 30 MIN: CPT | Performed by: FAMILY MEDICINE

## 2021-01-22 ASSESSMENT — PATIENT HEALTH QUESTIONNAIRE - PHQ9: SUM OF ALL RESPONSES TO PHQ QUESTIONS 1-9: 0

## 2021-01-22 NOTE — PROGRESS NOTES
CC   Chief Complaint   Patient presents with    Hypertension    Coronary Artery Disease     HPI:   Patient comes in today for the below issue(s). Routine follow-up of her chronic health issues. Cardiac issues-CAD, hypertension, aortic ectasia, hypertension, hyperlipidemia, diastolic dysfunction  She reports doing relatively well  States she is compliant with her medications  Saw cardiology in the fall and was given a 1 year follow-up  Denies chest pains  Blood pressures seem reasonably well controlled. She still has some occasional bradycardia that can be at times symptomatic  No syncope or near syncope  Lower extremity swelling is stable  Aortic ectasia was just identified in the fall and will be likely followed with serial CT scans or echo in the fall. CKD and secondary hyperparathyroidism with hypercalcemia  Follow-up  Sees nephrology  Notes and chart reviewed, also given a 1 year follow-up  Reports stable edema  No concerns raised otherwise    Orthopedic issues  Chronic arthralgias of the knees, chronic back pain  Issues are stable no new concerns      Review of Systems  Review of Systems   Constitutional: Positive for fatigue. Negative for chills and fever. HENT: Negative for trouble swallowing. Gastrointestinal: Negative for abdominal pain and constipation. Genitourinary: Negative for difficulty urinating.      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 Zaida Ashley yearly    Dysphagia     Hyperlipidemia     Hypertension     Hypothyroid     Impaired glucose tolerance     Liver cyst     Lumbosacral radiculopathy at S1 12/2015    left sided    Macular degeneration 6/13    Renal cyst seen by Dr Napier Im; benign    Vertigo 2/2015         PE:  VS:  /62   Pulse (!) 46   Temp 97.7 °F (36.5 °C) (Temporal)   Resp 16   Ht 5' 3\" (1.6 m)   Wt 191 lb (86.6 kg)   LMP  (LMP Unknown)   SpO2 97%   BMI 33.83 kg/m²   Physical Exam  Vitals signs reviewed. Constitutional:       Appearance: Normal appearance. She is not toxic-appearing. HENT:      Head: Normocephalic and atraumatic. Nose: Nose normal. No congestion. Mouth/Throat:      Mouth: Mucous membranes are moist.      Pharynx: Oropharynx is clear. Eyes:      Conjunctiva/sclera: Conjunctivae normal.   Neck:      Musculoskeletal: Neck supple. Vascular: No carotid bruit. Cardiovascular:      Rate and Rhythm: Normal rate and regular rhythm. Pulses: Normal pulses. Heart sounds: No murmur. Pulmonary:      Breath sounds: Normal breath sounds. No wheezing, rhonchi or rales. Abdominal:      General: Bowel sounds are normal. There is no distension. Tenderness: There is no abdominal tenderness. Musculoskeletal:      Right lower leg: Edema (Trace) present. Left lower leg: Edema (1+) present. Lymphadenopathy:      Cervical: No cervical adenopathy. Skin:     General: Skin is warm and dry. Neurological:      General: No focal deficit present. Mental Status: She is alert. Psychiatric:         Mood and Affect: Mood normal.         Behavior: Behavior normal.     Data: Lab work from the fall reviewed as well as consultant notes from cardiology and nephrology      Assessment (including specific orders/meds/labs):      Rolando Kellogg was seen today for hypertension and coronary artery disease. Diagnoses and all orders for this visit:    Mixed hyperlipidemia  -     Comprehensive Metabolic Panel;  Future    Acquired hypothyroidism    Hyperparathyroidism ([facility] [address]    Stage 3 chronic kidney disease, unspecified whether stage 3a or 3b CKD    Aortic ectasia (HCC)    Benign essential hypertension        Plan:

## 2021-01-23 LAB
ALBUMIN SERPL-MCNC: 3.8 G/DL (ref 3.5–5.2)
ALP BLD-CCNC: 131 U/L (ref 35–104)
ALT SERPL-CCNC: 43 U/L (ref 0–32)
ANION GAP SERPL CALCULATED.3IONS-SCNC: 9 MMOL/L (ref 7–16)
AST SERPL-CCNC: 32 U/L (ref 0–31)
BILIRUB SERPL-MCNC: 0.3 MG/DL (ref 0–1.2)
BUN BLDV-MCNC: 17 MG/DL (ref 8–23)
CALCIUM SERPL-MCNC: 9.4 MG/DL (ref 8.6–10.2)
CHLORIDE BLD-SCNC: 105 MMOL/L (ref 98–107)
CO2: 26 MMOL/L (ref 22–29)
CREAT SERPL-MCNC: 1.2 MG/DL (ref 0.5–1)
GFR AFRICAN AMERICAN: 52
GFR NON-AFRICAN AMERICAN: 43 ML/MIN/1.73
GLUCOSE BLD-MCNC: 104 MG/DL (ref 74–99)
POTASSIUM SERPL-SCNC: 4.3 MMOL/L (ref 3.5–5)
SODIUM BLD-SCNC: 140 MMOL/L (ref 132–146)
TOTAL PROTEIN: 6.6 G/DL (ref 6.4–8.3)

## 2021-01-24 PROBLEM — I13.0 BENIGN HYPERTENSIVE HEART AND KIDNEY DISEASE WITH HEART FAILURE AND WITH CHRONIC KIDNEY DISEASE STAGE I THROUGH STAGE IV, OR UNSPECIFIED(404.11): Status: ACTIVE | Noted: 2020-08-21

## 2021-01-24 PROBLEM — I51.7 LEFT VENTRICULAR HYPERTROPHY: Status: ACTIVE | Noted: 2020-08-21

## 2021-01-24 PROBLEM — E79.0 HYPERURICEMIA: Status: ACTIVE | Noted: 2020-08-21

## 2021-01-24 ASSESSMENT — ENCOUNTER SYMPTOMS
TROUBLE SWALLOWING: 0
ABDOMINAL PAIN: 0
CONSTIPATION: 0

## 2021-02-10 ENCOUNTER — IMMUNIZATION (OUTPATIENT)
Dept: PRIMARY CARE CLINIC | Age: 86
End: 2021-02-10
Payer: MEDICARE

## 2021-02-10 PROCEDURE — 91300 COVID-19, PFIZER VACCINE 30MCG/0.3ML DOSE: CPT | Performed by: NURSE PRACTITIONER

## 2021-02-10 PROCEDURE — 0002A COVID-19, PFIZER VACCINE 30MCG/0.3ML DOSE: CPT | Performed by: NURSE PRACTITIONER

## 2021-02-14 DIAGNOSIS — M54.17 LUMBOSACRAL RADICULOPATHY AT S1: ICD-10-CM

## 2021-02-14 DIAGNOSIS — Z79.899 MEDICATION MANAGEMENT: ICD-10-CM

## 2021-02-15 RX ORDER — GABAPENTIN 100 MG/1
CAPSULE ORAL
Qty: 180 CAPSULE | Refills: 0 | Status: SHIPPED
Start: 2021-02-15 | End: 2021-05-04 | Stop reason: SDUPTHER

## 2021-05-02 DIAGNOSIS — M54.17 LUMBOSACRAL RADICULOPATHY AT S1: ICD-10-CM

## 2021-05-02 DIAGNOSIS — Z79.899 MEDICATION MANAGEMENT: ICD-10-CM

## 2021-05-03 RX ORDER — GABAPENTIN 100 MG/1
CAPSULE ORAL
Qty: 180 CAPSULE | Refills: 3 | OUTPATIENT
Start: 2021-05-03

## 2021-05-25 ENCOUNTER — OFFICE VISIT (OUTPATIENT)
Dept: FAMILY MEDICINE CLINIC | Age: 86
End: 2021-05-25
Payer: MEDICARE

## 2021-05-25 VITALS
HEIGHT: 63 IN | SYSTOLIC BLOOD PRESSURE: 138 MMHG | HEART RATE: 45 BPM | TEMPERATURE: 97.1 F | BODY MASS INDEX: 33.31 KG/M2 | DIASTOLIC BLOOD PRESSURE: 67 MMHG | WEIGHT: 188 LBS | RESPIRATION RATE: 16 BRPM | OXYGEN SATURATION: 98 %

## 2021-05-25 DIAGNOSIS — Z00.00 ROUTINE GENERAL MEDICAL EXAMINATION AT A HEALTH CARE FACILITY: Primary | ICD-10-CM

## 2021-05-25 DIAGNOSIS — I10 ESSENTIAL HYPERTENSION: ICD-10-CM

## 2021-05-25 DIAGNOSIS — E78.2 MIXED HYPERLIPIDEMIA: ICD-10-CM

## 2021-05-25 DIAGNOSIS — E03.9 ACQUIRED HYPOTHYROIDISM: ICD-10-CM

## 2021-05-25 DIAGNOSIS — N18.30 STAGE 3 CHRONIC KIDNEY DISEASE, UNSPECIFIED WHETHER STAGE 3A OR 3B CKD (HCC): ICD-10-CM

## 2021-05-25 DIAGNOSIS — R73.09 ELEVATED GLUCOSE: ICD-10-CM

## 2021-05-25 DIAGNOSIS — M17.0 PRIMARY OSTEOARTHRITIS OF BOTH KNEES: ICD-10-CM

## 2021-05-25 DIAGNOSIS — R00.1 BRADYCARDIA: ICD-10-CM

## 2021-05-25 DIAGNOSIS — R10.10 PAIN OF UPPER ABDOMEN: ICD-10-CM

## 2021-05-25 PROBLEM — I13.0 BENIGN HYPERTENSIVE HEART AND KIDNEY DISEASE WITH HEART FAILURE AND WITH CHRONIC KIDNEY DISEASE STAGE I THROUGH STAGE IV, OR UNSPECIFIED(404.11): Status: RESOLVED | Noted: 2020-08-21 | Resolved: 2021-05-25

## 2021-05-25 LAB
BASOPHILS ABSOLUTE: 0.05 E9/L (ref 0–0.2)
BASOPHILS RELATIVE PERCENT: 0.9 % (ref 0–2)
EOSINOPHILS ABSOLUTE: 0.2 E9/L (ref 0.05–0.5)
EOSINOPHILS RELATIVE PERCENT: 3.6 % (ref 0–6)
HCT VFR BLD CALC: 41.2 % (ref 34–48)
HEMOGLOBIN: 12.9 G/DL (ref 11.5–15.5)
IMMATURE GRANULOCYTES #: 0.01 E9/L
IMMATURE GRANULOCYTES %: 0.2 % (ref 0–5)
LYMPHOCYTES ABSOLUTE: 1.94 E9/L (ref 1.5–4)
LYMPHOCYTES RELATIVE PERCENT: 34.6 % (ref 20–42)
MCH RBC QN AUTO: 29.5 PG (ref 26–35)
MCHC RBC AUTO-ENTMCNC: 31.3 % (ref 32–34.5)
MCV RBC AUTO: 94.1 FL (ref 80–99.9)
MONOCYTES ABSOLUTE: 0.42 E9/L (ref 0.1–0.95)
MONOCYTES RELATIVE PERCENT: 7.5 % (ref 2–12)
NEUTROPHILS ABSOLUTE: 2.99 E9/L (ref 1.8–7.3)
NEUTROPHILS RELATIVE PERCENT: 53.2 % (ref 43–80)
PDW BLD-RTO: 15 FL (ref 11.5–15)
PLATELET # BLD: 207 E9/L (ref 130–450)
PMV BLD AUTO: 11.8 FL (ref 7–12)
RBC # BLD: 4.38 E12/L (ref 3.5–5.5)
WBC # BLD: 5.6 E9/L (ref 4.5–11.5)

## 2021-05-25 PROCEDURE — 36415 COLL VENOUS BLD VENIPUNCTURE: CPT | Performed by: FAMILY MEDICINE

## 2021-05-25 PROCEDURE — 4040F PNEUMOC VAC/ADMIN/RCVD: CPT | Performed by: FAMILY MEDICINE

## 2021-05-25 PROCEDURE — 1123F ACP DISCUSS/DSCN MKR DOCD: CPT | Performed by: FAMILY MEDICINE

## 2021-05-25 PROCEDURE — G0439 PPPS, SUBSEQ VISIT: HCPCS | Performed by: FAMILY MEDICINE

## 2021-05-25 SDOH — ECONOMIC STABILITY: FOOD INSECURITY: WITHIN THE PAST 12 MONTHS, THE FOOD YOU BOUGHT JUST DIDN'T LAST AND YOU DIDN'T HAVE MONEY TO GET MORE.: NEVER TRUE

## 2021-05-25 ASSESSMENT — LIFESTYLE VARIABLES
HOW MANY STANDARD DRINKS CONTAINING ALCOHOL DO YOU HAVE ON A TYPICAL DAY: ONE OR TWO
AUDIT-C TOTAL SCORE: 0
HOW OFTEN DO YOU HAVE SIX OR MORE DRINKS ON ONE OCCASION: NEVER
AUDIT TOTAL SCORE: 1
HOW OFTEN DO YOU HAVE A DRINK CONTAINING ALCOHOL: MONTHLY OR LESS
HOW OFTEN DURING THE LAST YEAR HAVE YOU NEEDED AN ALCOHOLIC DRINK FIRST THING IN THE MORNING TO GET YOURSELF GOING AFTER A NIGHT OF HEAVY DRINKING: NEVER
HOW MANY STANDARD DRINKS CONTAINING ALCOHOL DO YOU HAVE ON A TYPICAL DAY: 0
HOW OFTEN DO YOU HAVE A DRINK CONTAINING ALCOHOL: 1
HAS A RELATIVE, FRIEND, DOCTOR, OR ANOTHER HEALTH PROFESSIONAL EXPRESSED CONCERN ABOUT YOUR DRINKING OR SUGGESTED YOU CUT DOWN: NO
HOW OFTEN DURING THE LAST YEAR HAVE YOU FAILED TO DO WHAT WAS NORMALLY EXPECTED FROM YOU BECAUSE OF DRINKING: NEVER
HOW OFTEN DURING THE LAST YEAR HAVE YOU HAD A FEELING OF GUILT OR REMORSE AFTER DRINKING: 0
AUDIT TOTAL SCORE: 0
HOW OFTEN DURING THE LAST YEAR HAVE YOU BEEN UNABLE TO REMEMBER WHAT HAPPENED THE NIGHT BEFORE BECAUSE YOU HAD BEEN DRINKING: NEVER
HOW OFTEN DURING THE LAST YEAR HAVE YOU FAILED TO DO WHAT WAS NORMALLY EXPECTED FROM YOU BECAUSE OF DRINKING: 0
HOW OFTEN DURING THE LAST YEAR HAVE YOU HAD A FEELING OF GUILT OR REMORSE AFTER DRINKING: NEVER
HOW OFTEN DURING THE LAST YEAR HAVE YOU FOUND THAT YOU WERE NOT ABLE TO STOP DRINKING ONCE YOU HAD STARTED: 0
HOW OFTEN DURING THE LAST YEAR HAVE YOU FOUND THAT YOU WERE NOT ABLE TO STOP DRINKING ONCE YOU HAD STARTED: NEVER
HAS A RELATIVE, FRIEND, DOCTOR, OR ANOTHER HEALTH PROFESSIONAL EXPRESSED CONCERN ABOUT YOUR DRINKING OR SUGGESTED YOU CUT DOWN: 0
HAVE YOU OR SOMEONE ELSE BEEN INJURED AS A RESULT OF YOUR DRINKING: NO

## 2021-05-25 ASSESSMENT — SOCIAL DETERMINANTS OF HEALTH (SDOH): HOW HARD IS IT FOR YOU TO PAY FOR THE VERY BASICS LIKE FOOD, HOUSING, MEDICAL CARE, AND HEATING?: NOT HARD AT ALL

## 2021-05-25 ASSESSMENT — PATIENT HEALTH QUESTIONNAIRE - PHQ9
2. FEELING DOWN, DEPRESSED OR HOPELESS: 0
SUM OF ALL RESPONSES TO PHQ QUESTIONS 1-9: 0
SUM OF ALL RESPONSES TO PHQ9 QUESTIONS 1 & 2: 0

## 2021-05-25 NOTE — PATIENT INSTRUCTIONS
cholesterol, this type of cholesterol actually carries cholesterol away from your arteries and may, therefore, help lower your risk of having a heart attack. You want this level to be high (ideally greater than 60). It is a risk to have a level less than 40. You can raise this good cholesterol by eating olive oil, canola oil, avocados, or nuts. Exercise raises this level, too. Fat    Fat is calorie dense and packs a lot of calories into a small amount of food. Even though fats should be limited due to their high calorie content, not all fats are bad. In fact, some fats are quite healthful. Fat can be broken down into four main types. The good-for-you fats are:   Monounsaturated fat  found in oils such as olive and canola, avocados, and nuts and natural nut butters; can decrease cholesterol levels, while keeping levels of HDL cholesterol high   Polyunsaturated fat  found in oils such as safflower, sunflower, soybean, corn, and sesame; can decrease total cholesterol and LDL cholesterol   Omega-3 fatty acids  particularly those found in fatty fish (such as salmon, trout, tuna, mackerel, herring, and sardines); can decrease risk of arrhythmias, decrease triglyceride levels, and slightly lower blood pressure   The fats that you want to limit are:   Saturated fat  found in animal products, many fast foods, and a few vegetables; increases total blood cholesterol, including LDL levels   Animal fats that are saturated include: butter, lard, whole-milk dairy products, meat fat, and poultry skin   Vegetable fats that are saturated include: hydrogenated shortening, palm oil, coconut oil, cocoa butter   Hydrogenated or trans fat  found in margarine and vegetable shortening, most shelf stable snack foods, and fried foods; increases LDL and decreases HDL     It is generally recommended that you limit your total fat for the day to less than 30% of your total calories.  If you follow an 1800-calorie heart healthy diet, for example, this would mean 60 grams of fat or less per day. Saturated fat and trans fat in your diet raises your blood cholesterol the most, much more than dietary cholesterol does. For this reason, on a heart-healthy diet, less than 7% of your calories should come from saturated fat and ideally 0% from trans fat. On an 1800-calorie diet, this translates into less than 14 grams of saturated fat per day, leaving 46 grams of fat to come from mono- and polyunsaturated fats.    Food Choices on a Heart Healthy Diet   Food Category   Foods Recommended   Foods to Avoid   Grains   Breads and rolls without salted tops Most dry and cooked cereals Unsalted crackers and breadsticks Low-sodium or homemade breadcrumbs or stuffing All rice and pastas   Breads, rolls, and crackers with salted tops High-fat baked goods (eg, muffins, donuts, pastries) Quick breads, self-rising flour, and biscuit mixes Regular bread crumbs Instant hot cereals Commercially prepared rice, pasta, or stuffing mixes   Vegetables   Most fresh, frozen, and low-sodium canned vegetables Low-sodium and salt-free vegetable juices Canned vegetables if unsalted or rinsed   Regular canned vegetables and juices, including sauerkraut and pickled vegetables Frozen vegetables with sauces Commercially prepared potato and vegetable mixes   Fruits   Most fresh, frozen, and canned fruits All fruit juices   Fruits processed with salt or sodium   Milk   Nonfat or low-fat (1%) milk Nonfat or low-fat yogurt Cottage cheese, low-fat ricotta, cheeses labeled as low-fat and low-sodium   Whole milk Reduced-fat (2%) milk Malted and chocolate milk Full fat yogurt Most cheeses (unless low-fat and low salt) Buttermilk (no more than 1 cup per week)   Meats and Beans   Lean cuts of fresh or frozen beef, veal, lamb, or pork (look for the word loin) Fresh or frozen poultry without the skin Fresh or frozen fish and some shellfish Egg whites and egg substitutes (Limit whole eggs to three per week) Tofu Nuts or seeds (unsalted, dry-roasted), low-sodium peanut butter Dried peas, beans, and lentils   Any smoked, cured, salted, or canned meat, fish, or poultry (including pelaez, chipped beef, cold cuts, hot dogs, sausages, sardines, and anchovies) Poultry skins Breaded and/or fried fish or meats Canned peas, beans, and lentils Salted nuts   Fats and Oils   Olive oil and canola oil Low-sodium, low-fat salad dressings and mayonnaise   Butter, margarine, coconut and palm oils, pelaez fat   Snacks, Sweets, and Condiments   Low-sodium or unsalted versions of broths, soups, soy sauce, and condiments Pepper, herbs, and spices; vinegar, lemon, or lime juice Low-fat frozen desserts (yogurt, sherbet, fruit bars) Sugar, cocoa powder, honey, syrup, jam, and preserves Low-fat, trans-fat free cookies, cakes, and pies James and animal crackers, fig bars, jaciel snaps   High-fat desserts Broth, soups, gravies, and sauces, made from instant mixes or other high-sodium ingredients Salted snack foods Canned olives Meat tenderizers, seasoning salt, and most flavored vinegars   Beverages   Low-sodium carbonated beverages Tea and coffee in moderation Soy milk   Commercially softened water   Suggestions   Make whole grains, fruits, and vegetables the base of your diet. Choose heart-healthy fats such as canola, olive, and flaxseed oil, and foods high in heart-healthy fats, such as nuts, seeds, soybeans, tofu, and fish. Eat fish at least twice per week; the fish highest in omega-3 fatty acids and lowest in mercury include salmon, herring, mackerel, sardines, and canned chunk light tuna. If you eat fish less than twice per week or have high triglycerides, talk to your doctor about taking fish oil supplements. Read food labels.    For products low in fat and cholesterol, look for fat free, low-fat, cholesterol free, saturated fat free, and trans fat freeAlso scan the Nutrition Facts Label, which lists saturated fat, trans fat, and cholesterol amounts. For products low in sodium, look for sodium free, very low sodium, low sodium, no added salt, and unsalted   Skip the salt when cooking or at the table; if food needs more flavor, get creative and try out different herbs and spices. Garlic and onion also add substantial flavor to foods. Trim any visible fat off meat and poultry before cooking, and drain the fat off after dykes. Use cooking methods that require little or no added fat, such as grilling, boiling, baking, poaching, broiling, roasting, steaming, stir-frying, and sauting. Avoid fast food and convenience food. They tend to be high in saturated and trans fat and have a lot of added salt. Talk to a registered dietitian for individualized diet advice. Last Reviewed: March 2011 Amelia Tinajero MS, MPH, RD   Updated: 3/29/2011   ·     Heart-Healthy Diet   Sodium, Fat, and Cholesterol Controlled Diet       What Is a Heart Healthy Diet? A heart-healthy diet is one that limits sodium , certain types of fat , and cholesterol . This type of diet is recommended for:   People with any form of cardiovascular disease (eg, coronary heart disease , peripheral vascular disease , previous heart attack , previous stroke )   People with risk factors for cardiovascular disease, such as high blood pressure , high cholesterol , or diabetes   Anyone who wants to lower their risk of developing cardiovascular disease   Sodium    Sodium is a mineral found in many foods. In general, most people consume much more sodium than they need. Diets high in sodium can increase blood pressure and lead to edema (water retention). On a heart-healthy diet, you should consume no more than 2,300 mg (milligrams) of sodium per dayabout the amount in one teaspoon of table salt. The foods highest in sodium include table salt (about 50% sodium), processed foods, convenience foods, and preserved foods.    Cholesterol    Cholesterol is a fat-like, waxy substance in your blood. Our bodies make some cholesterol. It is also found in animal products, with the highest amounts in fatty meat, egg yolks, whole milk, cheese, shellfish, and organ meats. On a heart-healthy diet, you should limit your cholesterol intake to less than 200 mg per day. It is normal and important to have some cholesterol in your bloodstream. But too much cholesterol can cause plaque to build up within your arteries, which can eventually lead to a heart attack or stroke. The two types of cholesterol that are most commonly referred to are:   Low-density lipoprotein (LDL) cholesterol  Also known as bad cholesterol, this is the cholesterol that tends to build up along your arteries. Bad cholesterol levels are increased by eating fats that are saturated or hydrogenated. Optimal level of this cholesterol is less than 100. Over 130 starts to get risky for heart disease. High-density lipoprotein (HDL) cholesterol  Also known as good cholesterol, this type of cholesterol actually carries cholesterol away from your arteries and may, therefore, help lower your risk of having a heart attack. You want this level to be high (ideally greater than 60). It is a risk to have a level less than 40. You can raise this good cholesterol by eating olive oil, canola oil, avocados, or nuts. Exercise raises this level, too. Fat    Fat is calorie dense and packs a lot of calories into a small amount of food. Even though fats should be limited due to their high calorie content, not all fats are bad. In fact, some fats are quite healthful. Fat can be broken down into four main types.    The good-for-you fats are:   Monounsaturated fat  found in oils such as olive and canola, avocados, and nuts and natural nut butters; can decrease cholesterol levels, while keeping levels of HDL cholesterol high   Polyunsaturated fat  found in oils such as safflower, sunflower, soybean, corn, and sesame; can decrease total cholesterol Vegetables   Most fresh, frozen, and low-sodium canned vegetables Low-sodium and salt-free vegetable juices Canned vegetables if unsalted or rinsed   Regular canned vegetables and juices, including sauerkraut and pickled vegetables Frozen vegetables with sauces Commercially prepared potato and vegetable mixes   Fruits   Most fresh, frozen, and canned fruits All fruit juices   Fruits processed with salt or sodium   Milk   Nonfat or low-fat (1%) milk Nonfat or low-fat yogurt Cottage cheese, low-fat ricotta, cheeses labeled as low-fat and low-sodium   Whole milk Reduced-fat (2%) milk Malted and chocolate milk Full fat yogurt Most cheeses (unless low-fat and low salt) Buttermilk (no more than 1 cup per week)   Meats and Beans   Lean cuts of fresh or frozen beef, veal, lamb, or pork (look for the word loin) Fresh or frozen poultry without the skin Fresh or frozen fish and some shellfish Egg whites and egg substitutes (Limit whole eggs to three per week) Tofu Nuts or seeds (unsalted, dry-roasted), low-sodium peanut butter Dried peas, beans, and lentils   Any smoked, cured, salted, or canned meat, fish, or poultry (including pelaez, chipped beef, cold cuts, hot dogs, sausages, sardines, and anchovies) Poultry skins Breaded and/or fried fish or meats Canned peas, beans, and lentils Salted nuts   Fats and Oils   Olive oil and canola oil Low-sodium, low-fat salad dressings and mayonnaise   Butter, margarine, coconut and palm oils, pelaez fat   Snacks, Sweets, and Condiments   Low-sodium or unsalted versions of broths, soups, soy sauce, and condiments Pepper, herbs, and spices; vinegar, lemon, or lime juice Low-fat frozen desserts (yogurt, sherbet, fruit bars) Sugar, cocoa powder, honey, syrup, jam, and preserves Low-fat, trans-fat free cookies, cakes, and pies James and animal crackers, fig bars, jaciel snaps   High-fat desserts Broth, soups, gravies, and sauces, made from instant mixes or other high-sodium ingredients Salted snack foods Canned olives Meat tenderizers, seasoning salt, and most flavored vinegars   Beverages   Low-sodium carbonated beverages Tea and coffee in moderation Soy milk   Commercially softened water   Suggestions   Make whole grains, fruits, and vegetables the base of your diet. Choose heart-healthy fats such as canola, olive, and flaxseed oil, and foods high in heart-healthy fats, such as nuts, seeds, soybeans, tofu, and fish. Eat fish at least twice per week; the fish highest in omega-3 fatty acids and lowest in mercury include salmon, herring, mackerel, sardines, and canned chunk light tuna. If you eat fish less than twice per week or have high triglycerides, talk to your doctor about taking fish oil supplements. Read food labels. For products low in fat and cholesterol, look for fat free, low-fat, cholesterol free, saturated fat free, and trans fat freeAlso scan the Nutrition Facts Label, which lists saturated fat, trans fat, and cholesterol amounts. For products low in sodium, look for sodium free, very low sodium, low sodium, no added salt, and unsalted   Skip the salt when cooking or at the table; if food needs more flavor, get creative and try out different herbs and spices. Garlic and onion also add substantial flavor to foods. Trim any visible fat off meat and poultry before cooking, and drain the fat off after dykes. Use cooking methods that require little or no added fat, such as grilling, boiling, baking, poaching, broiling, roasting, steaming, stir-frying, and sauting. Avoid fast food and convenience food. They tend to be high in saturated and trans fat and have a lot of added salt. Talk to a registered dietitian for individualized diet advice.       Last Reviewed: March 2011 Glenn Woodard MS, MPH, RD   Updated: 3/29/2011   ·     Keeping Home a [facility]       As we get older, changes in balance, gait, strength, vision, hearing, and cognition make even the most youthful senior more prone to accidents. Falls are one of the leading health risks for older people. This increased risk of falling is related to:   Aging process (eg, decreased muscle strength, slowed reflexes)   Higher incidence of chronic health problems (eg, arthritis, diabetes) that may limit mobility, agility or sensory awareness   Side effects of medicine (eg, dizziness, blurred vision)especially medicines like prescription pain medicines and drugs used to treat mental health conditions   Depending on the brittleness of your bones, the consequences of a fall can be serious and long lasting. Home Life   Research by the Association of Aging [facility]) shows that some home accidents among older adults can be prevented by making simple lifestyle changes and basic modifications and repairs to the home environment. Here are some lifestyle changes that experts recommend:   Have your hearing and vision checked regularly. Be sure to wear prescription glasses that are right for you. Speak to your doctor or pharmacist about the possible side effects of your medicines. A number of medicines can cause dizziness. If you have problems with sleep, talk to your doctor. Limit your intake of alcohol. If necessary, use a cane or walker to help maintain your balance. Wear supportive, rubber-soled shoes, even at home. If you live in a region that gets wintry weather, you may want to put special cleats on your shoes to prevent you from slipping on the snow and ice. Exercise regularly to help maintain muscle tone, agility, and balance. Always hold the banister when going up or down stairs. Also, use  bars when getting in or out of the bath or shower, or using the toilet. To avoid dizziness, get up slowly from a lying down position. Sit up first, dangling your legs for a minute or two before rising to a standing position.    Overall Home Safety Check   According to the Consumer Product Safety Commision's \"Older Consumer Home Safety Checklist,\" it is important to check for potential hazards in each room. And remember, proper lighting is an essential factor in home safety. If you cannot see clearly, you are more likely to fall. Important questions to ask yourself include:   Are lamp, electric, extension, and telephone cords placed out of the flow of traffic and maintained in good condition? Have frayed cords been replaced? Are all small rugs and runners slip resistant? If not, you can secure them to the floor with a special double-sided carpet tape. Are smoke detectors properly locatedone on every floor of your home and one outside of every sleeping area? Are they in good working order? Are batteries replaced at least once a year? Do you have a well-maintained carbon monoxide detector outside every sleeping are in your home? Does your furniture layout leave plenty of space to maneuver between and around chairs, tables, beds, and sofas? Are hallways, stairs and passages between rooms well lit? Can you reach a lamp without getting out of bed? Are floor surfaces well maintained? Shag rugs, high-pile carpeting, tile floors, and polished wood floors can be particularly slippery. Stairs should always have handrails and be carpeted or fitted with a non-skid tread. Is your telephone easily reachable. Is the cord safely tucked away? Room by Room   According to the Association of Aging, bathrooms and ramirez are the two most potentially hazardous rooms in your home. In the Kitchen    Be sure your stove is in proper working order and always make sure burners and the oven are off before you go out or go to sleep. Keep pots on the back burners, turn handles away from the front of the stove, and keep stove clean and free of grease build-up. Kitchen ventilation systems and range exhausts should be working properly.     Keep flammable objects such as towels and pot holders away from the cooking area except when in use. Make sure kitchen curtains are tied back. Move cords and appliances away from the sink and hot surfaces. If extension cords are needed, install wiring guides so they do not hang over the sink, range, or working areas. Look for coffee pots, kettles and toaster ovens with automatic shut-offs. Keep a mop handy in the kitchen so you can wipe up spills instantly. You should also have a small fire extinguisher. Arrange your kitchen with frequently used items on lower shelves to avoid the need to stand on a stepstool to reach them. Make sure countertops are well-lit to avoid injuries while cutting and preparing food. In the Bathroom    Use a non-slip mat or decals in the tub and shower, since wet, soapy tile or porcelain surfaces are extremely slippery. Make sure bathroom rugs are non-skid or tape them firmly to the floor. Bathtubs should have at least one, preferably two, grab bars, firmly attached to structural supports in the wall. (Do not use built-in soap holders or glass shower doors as grab bars.)    Tub seats fitted with non-slip material on the legs allow you to wash sitting down. For people with limited mobility, bathtub transfer benches allow you to slide safely into the tub. Raised toilet seats and toilet safety rails are helpful for those with knee or hip problems. In the [facility]    Make sure you use a nightlight and that the area around your bed is clear of potential obstacles. Be careful with electric blankets and never go to sleep with a heating pad, which can cause serious burns even if on a low setting. Use fire-resistant mattress covers and pillows, and NEVER smoke in bed. Keep a phone next to the bed that is programmed to dial 911 at the push of a button. If you have a chronic condition, you may want to sign on with an automatic call-in service.  Typically the system includes a small pendant that connects directly to an emergency medical voice-response

## 2021-05-25 NOTE — PROGRESS NOTES
Medicare Annual Wellness Visit  Name: Davina Vásquez Date: 2021   MRN: [id number] Sex: Female   Age: 82 y.o. Ethnicity: Non-/Non    : 1935 Race: Brent Marie is here for Medicare AWV    Screenings for behavioral, psychosocial and functional/safety risks, and cognitive dysfunction are all negative except as indicated below. These results, as well as other patient data from the [address] form, are documented in Flowsheets linked to this Encounter. Interval hx  No new issues identified. Chronic cold sensation and dry mouth, attributable to her meds. Unchanged. Not worse. Cardiac issues are stable. Denies CP. Still has chronic bradycardia- HR at home 45-60, most in the 50's. BP are acceptable but seem to be creeping up into the high 130's over 80's. 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 Denies instability    CKD is stable and will need labs checked today    Concerns today  Asking if can discontinue mammograms    abd pain  Has had a longstanding history of RLQ pain  Intermittent  Grabbing pain, worse with movement, alleviates with rest    R big toe question  Intermittent swelling, redness along medial aspect from nail bed to the distal toe  No pain but occasional discomfort noted. No Known Allergies      Prior to Visit Medications    Medication Sig Taking? Authorizing Provider   gabapentin (NEURONTIN) 100 MG capsule TAKE 1 CAPSULE BY MOUTH  TWICE DAILY Yes Wesley Milner MD   famotidine (PEPCID) 20 MG tablet TAKE 1 TABLET BY MOUTH  TWICE DAILY Yes Wesley Milner MD   busPIRone (BUSPAR) 5 MG tablet TAKE 1 TABLET DAILY AS    NEEDED.  Yes Wesley Milner MD   oxybutynin (DITROPAN-XL) 5 MG extended release tablet TAKE 1 TABLET BY MOUTH  EVERY MORNING Yes Wesley Milner MD   furosemide (LASIX) 20 MG tablet TAKE 1 TABLET BY MOUTH  DAILY Yes Wesley Milner MD   atorvastatin (LIPITOR) 20 MG tablet TAKE 1 TABLET BY MOUTH  DAILY  Patient taking differently: Take 20 mg by mouth nightly  Yes Rajiv Davidson MD   cloNIDine (CATAPRES) 0.1 MG tablet TAKE 1 TABLET BY MOUTH  TWICE DAILY  Patient taking differently: nightly TAKE 1 TABLET BY MOUTH  TWICE DAILY Yes Rajiv Davidson MD   enalapril (VASOTEC) 20 MG tablet TAKE 1 TABLET BY MOUTH  TWICE DAILY Yes Rajiv Davidson MD   NIFEdipine (PROCARDIA XL) 30 MG extended release tablet TAKE 1 TABLET BY MOUTH  DAILY Yes Rajiv Davidson MD   levothyroxine (SYNTHROID) 50 MCG tablet TAKE 1 TABLET BY MOUTH  DAILY Yes Rajiv Davidson MD   aspirin 81 MG tablet Take 1 tablet by mouth nightly  Yes Historical Provider, MD   guanFACINE (TENEX) 1 MG tablet nightly  Yes Historical Provider, MD   Multiple Vitamins-Minerals (PRESERVISION AREDS) TABS Take by mouth 2 times daily Yes Historical Provider, MD   nitroGLYCERIN (NITROSTAT) 0.4 MG SL tablet Place 1 tablet under the tongue every 5 minutes as needed for Chest pain Yes Rajiv Davidson MD   Biotin 40421 MCG TABS Take 1 tablet by mouth every morning  Yes Historical Provider, MD   Calcium Polycarbophil (FIBER) 625 MG TABS Take 2 tablets by mouth every morning  Yes Historical Provider, MD   vitamin B-12 (CYANOCOBALAMIN) 1000 MCG tablet Take 2,500 mcg by mouth every morning Instructed to hold 5 days pre-op  Patient not taking: Reported on 5/25/2021  Rajiv Davidson MD         Past Medical History:   Diagnosis Date    Aortic ectasia ([facility] [address] 09/2020    3.7 cm a sending aorta; ct reviewed per Dr Barbra Calle 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 Izabela Hardy yearly    Dysphagia     Hyperlipidemia     Hypertension     Hypothyroid     Impaired glucose tolerance     Liver cyst     Lumbosacral radiculopathy at S1 12/2015    left sided    Macular degeneration 6/13    Renal cyst     seen by Dr Asad Rodriguez; benign    Vertigo 2/2015       Past Surgical History:   Procedure Laterality Date    APPENDECTOMY      CARDIAC SURGERY  2007    double bypass @ [facility] [address] (Dr. Huseyin Coto)   [address]      right    COLONOSCOPY  2007    Dr Perkins Severe      partial (ovaries remain)    VITRECTOMY Right 11/2/2020    RIGHT EYE PARS PLANA  VITRECTOMY 25 GAUGE performed by Ashlie Wilson MD at [facility] OR         Family History   Problem Relation Age of Onset    Heart Disease Mother     Cancer Father         lung       CareTeam (Including outside providers/suppliers regularly involved in providing care):   Patient Care Team:  Leti Duenas MD as PCP - Isis Lanier MD as PCP - [facility]  Brianna Berger MD as Consulting Physician (Cardiac Electrophysiology)  Sebastian Sanchez MD as Consulting Physician (Cardiology)  Aaron Frankel, MD as Surgeon (Trauma Surgery)    Wt Readings from Last 3 Encounters:   05/25/21 188 lb (85.3 kg)   01/22/21 191 lb (86.6 kg)   11/02/20 198 lb (89.8 kg)     Vitals:    05/25/21 1102 05/25/21 1108   BP: (!) 145/73 138/67   Pulse: (!) 45 (!) 45   Resp: 16    Temp: 97.1 °F (36.2 °C)    TempSrc: Temporal    SpO2: 98%    Weight: 188 lb (85.3 kg)    Height: 5' 3\" (1.6 m)      Body mass index is 33.3 kg/m². Based upon direct observation of the patient, evaluation of cognition reveals recent and remote memory intact. Physical Exam  Vitals reviewed. Constitutional:       General: She is not in acute distress. Appearance: Normal appearance. HENT:      Head: Normocephalic and atraumatic. Right Ear: Tympanic membrane normal.      Left Ear: Tympanic membrane normal.      Nose: No congestion. Mouth/Throat:      Mouth: Mucous membranes are moist.      Pharynx: Oropharynx is clear. Eyes:      Conjunctiva/sclera: Conjunctivae normal.   Neck:      Vascular: No carotid bruit.    Cardiovascular: Rate and Rhythm: Regular rhythm. Bradycardia present. Pulses: Normal pulses. Heart sounds: Normal heart sounds. No murmur heard. Pulmonary:      Breath sounds: Normal breath sounds. No wheezing, rhonchi or rales. Abdominal:      General: Bowel sounds are normal. There is no distension. Tenderness: There is abdominal tenderness (TTP over R lower oblique, worse with movement). Musculoskeletal:         General: Normal range of motion. Cervical back: No tenderness. Right lower leg: Edema (tr) present. Left lower leg: Edema (  tr-1+) present. Lymphadenopathy:      Cervical: No cervical adenopathy. Skin:     General: Skin is warm and dry. Neurological:      General: No focal deficit present. Mental Status: She is alert. Deep Tendon Reflexes: Reflexes normal.   Psychiatric:         Mood and Affect: Mood normal.         Behavior: Behavior normal.           Patient's complete Health Risk Assessment and screening values have been reviewed and are found in Flowsheets. The following problems were reviewed today and where indicated follow up appointments were made and/or referrals ordered.     Positive Risk Factor Screenings with Interventions:           Health Habits/Nutrition:  Health Habits/Nutrition  Do you exercise for at least 20 minutes 2-3 times per week?: (!) No  Have you lost any weight without trying in the past 3 months?: No  Do you eat only one meal per day?: (!) Yes  Have you seen the dentist within the past year?: Yes  Body mass index: (!) 33.3  Health Habits/Nutrition Interventions:  · Inadequate physical activity:  patient is not ready to increase his/her physical activity level at this time  · Nutritional issues:  educational materials for healthy, well-balanced diet provided     Safety:  Safety  Do you have working smoke detectors?: Yes  Have all throw rugs been removed or fastened?: Yes  Do you have non-slip mats or surfaces in all bathtubs/showers?: (!) No  Do all of your stairways have a railing or banister?: Yes  Are your doorways, halls and stairs free of clutter?: Yes  Do you always fasten your seatbelt when you are in a car?: Yes  Safety Interventions:  · Patient declines any further evaluation/treatment for this issue     Personalized Preventive Plan   Current Health Maintenance Status  Immunization History   Administered Date(s) Administered    COVID-19, Mendez Peter, PF, 30mcg/0.3mL 01/20/2021, 02/10/2021    Influenza 10/04/2011, 10/11/2013    Influenza Vaccine, unspecified formulation 10/04/2011, 10/11/2013    Influenza Virus Vaccine 09/09/2020    Influenza, High Dose (Fluzone 65 yrs and older) 09/23/2014, 09/08/2015, 11/21/2016, 10/25/2017, 09/27/2018, 09/16/2019    Influenza, High-dose, Quadv, 65 yrs +, IM (Fluzone) 09/09/2020    Pneumococcal Conjugate 13-valent ([id number]) 11/16/2015    Pneumococcal Polysaccharide ([id number]) 07/12/2012, 06/04/2018    Tdap (Boostrix, Adacel) 10/04/2011    Zoster Live (Zostavax) 01/07/2016    Zoster Recombinant (Shingrix) 09/09/2020, 11/12/2020        Health Maintenance   Topic Date Due    Annual Wellness Visit (AWV)  05/13/2021    DTaP/Tdap/Td vaccine (2 - Td) 10/04/2021    Lipid screen  05/25/2022    TSH testing  05/25/2022    Potassium monitoring  05/25/2022    Creatinine monitoring  05/25/2022    Flu vaccine  Completed    Shingles Vaccine  Completed    Pneumococcal 65+ years Vaccine  Completed    COVID-19 Vaccine  Completed    DEXA (modify frequency per FRAX score)  Addressed    Hepatitis A vaccine  Aged Out    Hepatitis B vaccine  Aged Out    Hib vaccine  Aged Out    Meningococcal (ACWY) vaccine  Aged Out     Recommendations for Clip Due: see orders and patient instructions/AVS.  . Recommended screening schedule for the next 5-10 years is provided to the patient in written form: see Patient Instructions/AVS.    Loran Soulier was seen today for medicare awv.     Diagnoses and all orders for this visit:    Routine general medical examination at a health care facility    Mixed hyperlipidemia    Acquired hypothyroidism    Stage 3 chronic kidney disease, unspecified whether stage 3a or 3b CKD    Essential hypertension  -     CBC Auto Differential; Future  -     Comprehensive Metabolic Panel; Future  -     Lipid Panel; Future  -     TSH without Reflex; Future    Elevated glucose  -     Comprehensive Metabolic Panel; Future  -     Hemoglobin A1C; Future    Bradycardia    Pain of upper abdomen    Primary osteoarthritis of both knees                 Plan  Gaps identified and addressed as above    HTN at goal but watch for continued increases. Bradycardia is stable    Sess cardio as scheduled or sooner if issues change    Declines work up of knees    Likely abd pain is muscle strain (?oblique). Monitor for now. Checking routine labs    See specialists as scheduled. Call if issues in interim. She verbalized comfort and understanding of instructions. Return in about 6 months (around 11/25/2021), or if symptoms worsen or fail to improve, for Follow-up chronic issues, HTN.      Electronically signed by Phyllis Smith MD on 5/26/2021 at 8:36 AM

## 2021-05-26 PROBLEM — M17.0 PRIMARY OSTEOARTHRITIS OF BOTH KNEES: Status: ACTIVE | Noted: 2021-05-26

## 2021-05-26 LAB
ALBUMIN SERPL-MCNC: 3.9 G/DL (ref 3.5–5.2)
ALP BLD-CCNC: 117 U/L (ref 35–104)
ALT SERPL-CCNC: 18 U/L (ref 0–32)
ANION GAP SERPL CALCULATED.3IONS-SCNC: 13 MMOL/L (ref 7–16)
AST SERPL-CCNC: 18 U/L (ref 0–31)
BILIRUB SERPL-MCNC: 0.4 MG/DL (ref 0–1.2)
BUN BLDV-MCNC: 20 MG/DL (ref 6–23)
CALCIUM SERPL-MCNC: 9.9 MG/DL (ref 8.6–10.2)
CHLORIDE BLD-SCNC: 105 MMOL/L (ref 98–107)
CHOLESTEROL, TOTAL: 129 MG/DL (ref 0–199)
CO2: 25 MMOL/L (ref 22–29)
CREAT SERPL-MCNC: 1.2 MG/DL (ref 0.5–1)
GFR AFRICAN AMERICAN: 52
GFR NON-AFRICAN AMERICAN: 43 ML/MIN/1.73
GLUCOSE BLD-MCNC: 98 MG/DL (ref 74–99)
HBA1C MFR BLD: 6.3 % (ref 4–5.6)
HDLC SERPL-MCNC: 55 MG/DL
LDL CHOLESTEROL CALCULATED: 55 MG/DL (ref 0–99)
POTASSIUM SERPL-SCNC: 4.4 MMOL/L (ref 3.5–5)
SODIUM BLD-SCNC: 143 MMOL/L (ref 132–146)
TOTAL PROTEIN: 6.6 G/DL (ref 6.4–8.3)
TRIGL SERPL-MCNC: 97 MG/DL (ref 0–149)
TSH SERPL DL<=0.05 MIU/L-ACNC: 2.41 UIU/ML (ref 0.27–4.2)
VLDLC SERPL CALC-MCNC: 19 MG/DL

## 2021-06-03 ENCOUNTER — OFFICE VISIT (OUTPATIENT)
Dept: FAMILY MEDICINE CLINIC | Age: 86
End: 2021-06-03
Payer: MEDICARE

## 2021-06-03 VITALS
TEMPERATURE: 97 F | SYSTOLIC BLOOD PRESSURE: 138 MMHG | DIASTOLIC BLOOD PRESSURE: 78 MMHG | HEART RATE: 74 BPM | RESPIRATION RATE: 16 BRPM | HEIGHT: 63 IN | OXYGEN SATURATION: 97 % | BODY MASS INDEX: 33.13 KG/M2 | WEIGHT: 187 LBS

## 2021-06-03 DIAGNOSIS — M25.562 ACUTE PAIN OF LEFT KNEE: ICD-10-CM

## 2021-06-03 DIAGNOSIS — M71.22 BAKER CYST, LEFT: Primary | ICD-10-CM

## 2021-06-03 DIAGNOSIS — M17.0 OSTEOARTHRITIS OF BOTH KNEES, UNSPECIFIED OSTEOARTHRITIS TYPE: ICD-10-CM

## 2021-06-03 PROCEDURE — 1123F ACP DISCUSS/DSCN MKR DOCD: CPT | Performed by: FAMILY MEDICINE

## 2021-06-03 PROCEDURE — 99213 OFFICE O/P EST LOW 20 MIN: CPT | Performed by: FAMILY MEDICINE

## 2021-06-03 PROCEDURE — 4040F PNEUMOC VAC/ADMIN/RCVD: CPT | Performed by: FAMILY MEDICINE

## 2021-06-03 PROCEDURE — 1036F TOBACCO NON-USER: CPT | Performed by: FAMILY MEDICINE

## 2021-06-03 PROCEDURE — 96372 THER/PROPH/DIAG INJ SC/IM: CPT | Performed by: FAMILY MEDICINE

## 2021-06-03 PROCEDURE — G8400 PT W/DXA NO RESULTS DOC: HCPCS | Performed by: FAMILY MEDICINE

## 2021-06-03 PROCEDURE — 1090F PRES/ABSN URINE INCON ASSESS: CPT | Performed by: FAMILY MEDICINE

## 2021-06-03 PROCEDURE — G8427 DOCREV CUR MEDS BY ELIG CLIN: HCPCS | Performed by: FAMILY MEDICINE

## 2021-06-03 PROCEDURE — G8417 CALC BMI ABV UP PARAM F/U: HCPCS | Performed by: FAMILY MEDICINE

## 2021-06-03 RX ORDER — LIDOCAINE HYDROCHLORIDE 10 MG/ML
1 INJECTION, SOLUTION EPIDURAL; INFILTRATION; INTRACAUDAL; PERINEURAL ONCE
Status: COMPLETED | OUTPATIENT
Start: 2021-06-03 | End: 2021-06-03

## 2021-06-03 RX ORDER — TRIAMCINOLONE ACETONIDE 40 MG/ML
40 INJECTION, SUSPENSION INTRA-ARTICULAR; INTRAMUSCULAR ONCE
Status: COMPLETED | OUTPATIENT
Start: 2021-06-03 | End: 2021-06-03

## 2021-06-03 RX ADMIN — LIDOCAINE HYDROCHLORIDE 1 ML: 10 INJECTION, SOLUTION EPIDURAL; INFILTRATION; INTRACAUDAL; PERINEURAL at 15:12

## 2021-06-03 RX ADMIN — TRIAMCINOLONE ACETONIDE 40 MG: 40 INJECTION, SUSPENSION INTRA-ARTICULAR; INTRAMUSCULAR at 15:13

## 2021-06-03 ASSESSMENT — ENCOUNTER SYMPTOMS
EYES NEGATIVE: 1
ALLERGIC/IMMUNOLOGIC NEGATIVE: 1
GASTROINTESTINAL NEGATIVE: 1
RESPIRATORY NEGATIVE: 1

## 2021-06-03 NOTE — PROGRESS NOTES
at S1 12/2015    left sided    Macular degeneration 6/13    Renal cyst     seen by Dr Brandon Schreiber; benign    Vertigo 2/2015       Past Surgical History:        Procedure Laterality Date    APPENDECTOMY      CARDIAC SURGERY  2007    double bypass @ [facility] [address] (Dr. Iliana Doe)   [address]      right    COLONOSCOPY  2007    Dr Sirisha Howell      partial (ovaries remain)    VITRECTOMY Right 11/2/2020    RIGHT EYE PARS PLANA  VITRECTOMY 25 GAUGE performed by Shelbie Roque MD at [facility] OR       Allergies:    Patient has no known allergies. Social History:   Social History     Socioeconomic History    Marital status:      Spouse name: Not on file    Number of children: Not on file    Years of education: Not on file    Highest education level: Not on file   Occupational History    Not on file   Tobacco Use    Smoking status: Former Smoker    Smokeless tobacco: Never Used    Tobacco comment: quit in her late 19's   Vaping Use    Vaping Use: Never used   Substance and Sexual Activity    Alcohol use: Yes     Comment: rare    Drug use: No    Sexual activity: Not on file   Other Topics Concern    Not on file   Social History Narrative    Not on file     Social Determinants of Health     Financial Resource Strain: Low Risk     Difficulty of Paying Living Expenses: Not hard at all   Food Insecurity: No Food Insecurity    Worried About [address] in the Last Year: Never true    [facility] in the Last Year: Never true   Transportation Needs:     Lack of Transportation (Medical):      Lack of Transportation (Non-Medical):    Physical Activity:     Days of Exercise per Week:     Minutes of Exercise per Session:    Stress:     Feeling of Stress :    Social Connections:     Frequency of Communication with Friends and Family:     Frequency of Social Gatherings with Friends and Family:     Attends Jew Services:     Active Member of Clubs or Organizations: since yesterday  -No obvious history of fall or trauma  -Tenderness over medial joint line and proximal medial gastrocnemius head  -Possibly due to osteoarthritis of knee, and aggravated pain in addition due to gastrocnemius  muscle strain and Baker's cyst  -Intra-articular injection applied  -Topical NSAID given    Osteoarthritis of both knees, unspecified osteoarthritis type  -Intra-articular cortisone shot given today  -Also prescribed topical NSAID    Procedure note: Major joint intra-articular injection  -Informed consent was taken  -Landmarks for left knee injection was figured out  -Surface cleaned with antiseptic solution, surface -local anesthesia applied  -With full aseptic precaution, 40 mg Kenalog and 1 mL of 1% lidocaine was injected into the left knee  -Area sealed with Band-Aid  -No immediate postprocedure complication noted. Medical Decision Making  Number and Complexity of Problems Addressed:   Acute Uncomplicated Illnesses or Injuries: Left knee pain/osteoarthritis of knee  Level of Problems Addressed:  Low (2+ self-limited or minor problems / 1 stable chronic illness / 1 acute, uncomplicated illness or injury)  Amount and/or Complexity of Data to be Reviewed and Analyzed:    Limited (1 of 2 Categories) Category 1 (Any two of the following, can duplicate): External Note from Unique Source, Review Results of Unique Test, Order Unique Test / Category 2 (Assessment Requiring Independent Historian(s))  Risk of Complications and/or Morbidity or Mortality of Patient Management:   Low risk of morbidity from additional diagnostic testing or treatment  Today's visit has a medical decision making level of Low. I encourage further reading and education about your health conditions. Information on many healthconditions is provided by the American Academy of Family Physicians: https://familydoctor. org/  Please bring any questions to me at your next visit.     Return to Office: No follow-ups on file.    Medication List:    Current Outpatient Medications   Medication Sig Dispense Refill    gabapentin (NEURONTIN) 100 MG capsule TAKE 1 CAPSULE BY MOUTH  TWICE DAILY 180 capsule 0    famotidine (PEPCID) 20 MG tablet TAKE 1 TABLET BY MOUTH  TWICE DAILY 180 tablet 3    busPIRone (BUSPAR) 5 MG tablet TAKE 1 TABLET DAILY AS    NEEDED. 90 tablet 3    oxybutynin (DITROPAN-XL) 5 MG extended release tablet TAKE 1 TABLET BY MOUTH  EVERY MORNING 90 tablet 3    furosemide (LASIX) 20 MG tablet TAKE 1 TABLET BY MOUTH  DAILY 90 tablet 3    atorvastatin (LIPITOR) 20 MG tablet TAKE 1 TABLET BY MOUTH  DAILY (Patient taking differently: Take 20 mg by mouth nightly ) 90 tablet 3    cloNIDine (CATAPRES) 0.1 MG tablet TAKE 1 TABLET BY MOUTH  TWICE DAILY (Patient taking differently: nightly TAKE 1 TABLET BY MOUTH  TWICE DAILY) 180 tablet 3    enalapril (VASOTEC) 20 MG tablet TAKE 1 TABLET BY MOUTH  TWICE DAILY 180 tablet 3    NIFEdipine (PROCARDIA XL) 30 MG extended release tablet TAKE 1 TABLET BY MOUTH  DAILY 90 tablet 3    levothyroxine (SYNTHROID) 50 MCG tablet TAKE 1 TABLET BY MOUTH  DAILY 90 tablet 3    aspirin 81 MG tablet Take 1 tablet by mouth nightly       guanFACINE (TENEX) 1 MG tablet nightly       Multiple Vitamins-Minerals (PRESERVISION AREDS) TABS Take by mouth 2 times daily      nitroGLYCERIN (NITROSTAT) 0.4 MG SL tablet Place 1 tablet under the tongue every 5 minutes as needed for Chest pain 25 tablet 3    Biotin 36685 MCG TABS Take 1 tablet by mouth every morning       Calcium Polycarbophil (FIBER) 625 MG TABS Take 2 tablets by mouth every morning       vitamin B-12 (CYANOCOBALAMIN) 1000 MCG tablet Take 2,500 mcg by mouth every morning Instructed to hold 5 days pre-op 30 tablet 5     No current facility-administered medications for this visit. Nadia Valiente MD       This document may have been prepared at least partiallythrough the use of voice recognition software.  Although effort is taken to assure the accuracy of this document, it is possible that grammatical, syntax,  or spelling errors may occur.

## 2021-06-03 NOTE — PROGRESS NOTES
[address]  Precepting Note    Subjective: Increased knee pain  Known OA  Acutely having posterior medial L knee  Denies injury or trauma  No new instability or falls  Having some swelling     ROS otherwise negative    Past medical, surgical, family and social history were reviewed, non-contributory, and unchanged unless otherwise stated. Objective:    /78   Pulse 74   Temp 97 °F (36.1 °C) (Temporal)   Resp 16   Ht 5' 3\" (1.6 m)   Wt 187 lb (84.8 kg)   LMP  (LMP Unknown)   SpO2 97%   BMI 33.13 kg/m²     Exam is as noted by resident with the following changes, additions or corrections:    General:  NAD; alert & oriented x 3   MS: L knee with swelling   +patellar grind   +Baker's cyst   +TTP over gastroc    Assessment/Plan:    L knee pain   New Baker's cyst   gastroc strain   Steroid injection in office today- done under my direct supervision. See resident note for details. Topical voltaren      Attending Physician Statement  I have reviewed the chart, including any radiology or labs, and have seen the patient with the resident(s). I personally reviewed and performed key elements of the history and exam.  I agree with the assessment, plan and orders as documented by the resident. Please refer to the resident note for additional information.       Electronically signed by Manjinder Smith MD on 6/3/2021 at 2:40 PM

## 2021-08-09 ENCOUNTER — HOSPITAL ENCOUNTER (OUTPATIENT)
Age: 86
Discharge: HOME | End: 2021-08-09
Payer: MEDICARE

## 2021-08-09 ENCOUNTER — OFFICE VISIT (OUTPATIENT)
Dept: FAMILY MEDICINE CLINIC | Age: 86
End: 2021-08-09
Payer: MEDICARE

## 2021-08-09 VITALS
RESPIRATION RATE: 20 BRPM | WEIGHT: 183.8 LBS | BODY MASS INDEX: 32.57 KG/M2 | DIASTOLIC BLOOD PRESSURE: 93 MMHG | HEIGHT: 63 IN | SYSTOLIC BLOOD PRESSURE: 156 MMHG | HEART RATE: 66 BPM | OXYGEN SATURATION: 98 % | TEMPERATURE: 97.8 F

## 2021-08-09 DIAGNOSIS — N18.30 STAGE 3 CHRONIC KIDNEY DISEASE, UNSPECIFIED WHETHER STAGE 3A OR 3B CKD (HCC): ICD-10-CM

## 2021-08-09 DIAGNOSIS — R09.82 POSTNASAL DRIP: Primary | ICD-10-CM

## 2021-08-09 DIAGNOSIS — J02.9 SORE THROAT: ICD-10-CM

## 2021-08-09 LAB
ANION GAP SERPL CALCULATED.3IONS-SCNC: 13 MMOL/L (ref 7–16)
BUN BLDV-MCNC: 11 MG/DL (ref 6–23)
CALCIUM SERPL-MCNC: 9.6 MG/DL (ref 8.6–10.2)
CHLORIDE BLD-SCNC: 102 MMOL/L (ref 98–107)
CO2: 27 MMOL/L (ref 22–29)
CREAT SERPL-MCNC: 1.1 MG/DL (ref 0.5–1)
GFR AFRICAN AMERICAN: 57
GFR NON-AFRICAN AMERICAN: 47 ML/MIN/1.73
GLUCOSE BLD-MCNC: 111 MG/DL (ref 74–99)
POTASSIUM SERPL-SCNC: 3.7 MMOL/L (ref 3.5–5)
S PYO AG THROAT QL: NORMAL
SODIUM BLD-SCNC: 142 MMOL/L (ref 132–146)

## 2021-08-09 PROCEDURE — G8417 CALC BMI ABV UP PARAM F/U: HCPCS | Performed by: FAMILY MEDICINE

## 2021-08-09 PROCEDURE — 87880 STREP A ASSAY W/OPTIC: CPT | Performed by: FAMILY MEDICINE

## 2021-08-09 PROCEDURE — 1123F ACP DISCUSS/DSCN MKR DOCD: CPT | Performed by: FAMILY MEDICINE

## 2021-08-09 PROCEDURE — 80048 BASIC METABOLIC PNL TOTAL CA: CPT

## 2021-08-09 PROCEDURE — 1090F PRES/ABSN URINE INCON ASSESS: CPT | Performed by: FAMILY MEDICINE

## 2021-08-09 PROCEDURE — G8427 DOCREV CUR MEDS BY ELIG CLIN: HCPCS | Performed by: FAMILY MEDICINE

## 2021-08-09 PROCEDURE — 4040F PNEUMOC VAC/ADMIN/RCVD: CPT | Performed by: FAMILY MEDICINE

## 2021-08-09 PROCEDURE — G8400 PT W/DXA NO RESULTS DOC: HCPCS | Performed by: FAMILY MEDICINE

## 2021-08-09 PROCEDURE — 36415 COLL VENOUS BLD VENIPUNCTURE: CPT

## 2021-08-09 PROCEDURE — 1036F TOBACCO NON-USER: CPT | Performed by: FAMILY MEDICINE

## 2021-08-09 PROCEDURE — 99213 OFFICE O/P EST LOW 20 MIN: CPT | Performed by: FAMILY MEDICINE

## 2021-08-09 RX ORDER — CETIRIZINE HYDROCHLORIDE 5 MG/1
5 TABLET ORAL DAILY
Qty: 30 TABLET | Refills: 1 | Status: SHIPPED
Start: 2021-08-09 | End: 2022-09-23

## 2021-08-09 RX ORDER — FLUTICASONE PROPIONATE 50 MCG
2 SPRAY, SUSPENSION (ML) NASAL DAILY
Qty: 3 BOTTLE | Refills: 1 | Status: SHIPPED
Start: 2021-08-09 | End: 2022-09-23

## 2021-08-09 NOTE — PROGRESS NOTES
8/9/2021    Meggan Haddad is a 82 y.o. femalehere for:    HPI:  CC- postnasal drip  Started Friday afternoon  then cough to get it out- irritating throat. cough has been productive of white/milky sputum   Sore throat currently with hoarseness of the voice  Dysphagia food and liquid. No hearburn  No congestion  No sinus pressure   No headache  No chest pain or sob  Not eating at all- drinking very little . But has not lost any weight   No allergies  No fever or chills    Cough has improved. Unchanged otherwise for other symptoms  Used to do flonase- bot does not like sprays  Has not tried anything this time     BP Readings from Last 3 Encounters:   08/09/21 (!) 156/93   06/03/21 138/78   05/25/21 138/67     Current Outpatient Medications   Medication Sig Dispense Refill    gabapentin (NEURONTIN) 100 MG capsule Take 1 capsule by mouth 2 times daily for 90 days. TAKE 1 CAPSULE BY MOUTH  TWICE DAILY 180 capsule 0    famotidine (PEPCID) 20 MG tablet TAKE 1 TABLET BY MOUTH  TWICE DAILY 180 tablet 3    busPIRone (BUSPAR) 5 MG tablet TAKE 1 TABLET DAILY AS    NEEDED.  90 tablet 3    oxybutynin (DITROPAN-XL) 5 MG extended release tablet TAKE 1 TABLET BY MOUTH  EVERY MORNING 90 tablet 3    furosemide (LASIX) 20 MG tablet TAKE 1 TABLET BY MOUTH  DAILY 90 tablet 3    atorvastatin (LIPITOR) 20 MG tablet TAKE 1 TABLET BY MOUTH  DAILY (Patient taking differently: Take 20 mg by mouth nightly ) 90 tablet 3    cloNIDine (CATAPRES) 0.1 MG tablet TAKE 1 TABLET BY MOUTH  TWICE DAILY (Patient taking differently: nightly TAKE 1 TABLET BY MOUTH  TWICE DAILY) 180 tablet 3    enalapril (VASOTEC) 20 MG tablet TAKE 1 TABLET BY MOUTH  TWICE DAILY 180 tablet 3    NIFEdipine (PROCARDIA XL) 30 MG extended release tablet TAKE 1 TABLET BY MOUTH  DAILY 90 tablet 3    levothyroxine (SYNTHROID) 50 MCG tablet TAKE 1 TABLET BY MOUTH  DAILY 90 tablet 3    aspirin 81 MG tablet Take 1 tablet by mouth nightly       guanFACINE (TENEX) 1 MG tablet nightly       Multiple Vitamins-Minerals (PRESERVISION AREDS) TABS Take by mouth 2 times daily      nitroGLYCERIN (NITROSTAT) 0.4 MG SL tablet Place 1 tablet under the tongue every 5 minutes as needed for Chest pain 25 tablet 3    Biotin 55155 MCG TABS Take 1 tablet by mouth every morning       Calcium Polycarbophil (FIBER) 625 MG TABS Take 2 tablets by mouth every morning       vitamin B-12 (CYANOCOBALAMIN) 1000 MCG tablet Take 2,500 mcg by mouth every morning Instructed to hold 5 days pre-op 30 tablet 5     No current facility-administered medications for this visit.       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 Radha Gloria yearly    Dysphagia     Hyperlipidemia     Hypertension     Hypothyroid     Impaired glucose tolerance     Liver cyst     Lumbosacral radiculopathy at S1 12/2015    left sided    Macular degeneration 6/13    Renal cyst     seen by Dr David Leon; benign    Vertigo 2/2015     Past Surgical History:   Procedure Laterality Date    APPENDECTOMY      CARDIAC SURGERY  2007    double bypass @ [facility] (Dr. Luke Ambrosio)   [address]      right    COLONOSCOPY  2007    Dr Ruthie Babcock      partial (ovaries remain)    VITRECTOMY Right 11/2/2020    RIGHT EYE PARS PLANA  VITRECTOMY 25 GAUGE performed by Bhumika Patten MD at [facility] OR       Family History:      Problem Relation Age of Onset    Heart Disease Mother     Cancer Father         lung       Social History:  Social History     Tobacco Use    Smoking status: Former Smoker    Smokeless tobacco: Never Used    Tobacco comment: quit in her late 19's   Substance Use Topics    Alcohol use: Mucous membranes are moist.      Pharynx: Posterior oropharyngeal erythema present. No oropharyngeal exudate. Eyes:      General:         Right eye: No discharge. Left eye: No discharge. Cardiovascular:      Rate and Rhythm: Normal rate and regular rhythm. Heart sounds: Normal heart sounds. No murmur heard. Pulmonary:      Effort: No respiratory distress. Breath sounds: Normal breath sounds. No wheezing or rhonchi. Abdominal:      General: Abdomen is flat. There is no distension. Palpations: Abdomen is soft. Tenderness: There is no abdominal tenderness. Musculoskeletal:      Cervical back: Normal range of motion and neck supple. No rigidity. Right lower leg: No edema. Left lower leg: No edema. Lymphadenopathy:      Cervical: No cervical adenopathy. Skin:     Findings: No rash. Neurological:      General: No focal deficit present. Mental Status: She is alert and oriented to person, place, and time. Psychiatric:         Mood and Affect: Mood normal.         Behavior: Behavior normal.         Assessment/Plan:  Thony Monday was seen today for cough, congestion, drainage and pharyngitis. Diagnoses and all orders for this visit:    Postnasal drip  Advised on using flonase regularly every day and start Zyrtec.   -     fluticasone (FLONASE) 50 MCG/ACT nasal spray; 2 sprays by Each Nostril route daily  -     cetirizine (ZYRTEC) 5 MG tablet; Take 1 tablet by mouth daily    Sore throat  Strep negative. Likely due to irritation from coughing from postnasal drip. Denies heartburn. -     POCT rapid strep A    Stage 3 chronic kidney disease, unspecified whether stage 3a or 3b CKD ([facility] [address]  -     BASIC METABOLIC PANEL; Future        Follow up:  Prn     Patient agrees with the above stated plan.       Carlos Lopez MD  PGY-3 Family Medicine

## 2021-08-09 NOTE — PROGRESS NOTES
Subjective:    Sumanth Shrestha is here for post nasal drip associated with cough that occurs yearly. She is hoarse and the throat is irritated. She has CKD. She hasn't eaten for the past two days. She uses Flonase with good results usually. ROS: Otherwise negative    Patient Active Problem List   Diagnosis    Renal cyst    Coronary artery disease involving native coronary artery of native heart    Hyperlipidemia    Essential hypertension    Neuropathy of lower extremity    CKD (chronic kidney disease) stage 3, GFR 30-59 ml/min (HCC)    Malignant basal cell neoplasm of skin    Paresthesia of foot    Bradycardia on ECG    Paresthesia of left leg    Diastolic dysfunction    Hypothyroid    Vertigo    Pre-syncope    Hx of CABG    Lumbosacral radiculopathy at S1    Bradycardia    Abnormal EKG    Pharyngoesophageal dysphagia    Precordial pain    Hyperparathyroidism ([address]    Aortic ectasia (HCC)    Vitreomacular traction syndrome of right eye    Epiretinal membrane, right eye    Left ventricular hypertrophy    Hyperuricemia    Primary osteoarthritis of both knees       Past medical, surgical, family and social history were reviewed, non-contributory, and unchanged unless otherwise stated. Objective:    BP (!) 156/93   Pulse 66   Temp 97.8 °F (36.6 °C)   Resp 20   Ht 5' 3\" (1.6 m)   Wt 183 lb 12.8 oz (83.4 kg)   LMP  (LMP Unknown)   SpO2 98%   BMI 32.56 kg/m²     Exam is as noted by resident with the following changes, additions or corrections:      Assessment/Plan:        Sumanth Shrestha was seen today for cough, congestion, drainage and pharyngitis. Diagnoses and all orders for this visit:    Postnasal drip  -     fluticasone (FLONASE) 50 MCG/ACT nasal spray; 2 sprays by Each Nostril route daily  -     cetirizine (ZYRTEC) 5 MG tablet;  Take 1 tablet by mouth daily    Sore throat  -     POCT rapid strep A    Stage 3 chronic kidney disease, unspecified whether stage 3a or 3b CKD ([address]  -     BASIC

## 2021-08-16 ASSESSMENT — ENCOUNTER SYMPTOMS
NAUSEA: 0
COUGH: 1
SINUS PRESSURE: 0
RHINORRHEA: 0
SINUS PAIN: 0
TROUBLE SWALLOWING: 1
SORE THROAT: 1
ABDOMINAL PAIN: 0
VOICE CHANGE: 1
VOMITING: 0
SHORTNESS OF BREATH: 0
WHEEZING: 0

## 2021-08-19 ENCOUNTER — OFFICE VISIT (OUTPATIENT)
Dept: FAMILY MEDICINE CLINIC | Age: 86
End: 2021-08-19
Payer: MEDICARE

## 2021-08-19 VITALS
TEMPERATURE: 97.2 F | HEART RATE: 60 BPM | HEIGHT: 63 IN | SYSTOLIC BLOOD PRESSURE: 145 MMHG | BODY MASS INDEX: 32.43 KG/M2 | WEIGHT: 183 LBS | OXYGEN SATURATION: 98 % | DIASTOLIC BLOOD PRESSURE: 89 MMHG | RESPIRATION RATE: 16 BRPM

## 2021-08-19 DIAGNOSIS — M25.561 ACUTE PAIN OF RIGHT KNEE: Primary | ICD-10-CM

## 2021-08-19 PROCEDURE — G8417 CALC BMI ABV UP PARAM F/U: HCPCS | Performed by: FAMILY MEDICINE

## 2021-08-19 PROCEDURE — G8427 DOCREV CUR MEDS BY ELIG CLIN: HCPCS | Performed by: FAMILY MEDICINE

## 2021-08-19 PROCEDURE — 1123F ACP DISCUSS/DSCN MKR DOCD: CPT | Performed by: FAMILY MEDICINE

## 2021-08-19 PROCEDURE — 99213 OFFICE O/P EST LOW 20 MIN: CPT | Performed by: FAMILY MEDICINE

## 2021-08-19 PROCEDURE — 1090F PRES/ABSN URINE INCON ASSESS: CPT | Performed by: FAMILY MEDICINE

## 2021-08-19 PROCEDURE — 4040F PNEUMOC VAC/ADMIN/RCVD: CPT | Performed by: FAMILY MEDICINE

## 2021-08-19 PROCEDURE — G8400 PT W/DXA NO RESULTS DOC: HCPCS | Performed by: FAMILY MEDICINE

## 2021-08-19 PROCEDURE — 1036F TOBACCO NON-USER: CPT | Performed by: FAMILY MEDICINE

## 2021-08-19 RX ORDER — KETOROLAC TROMETHAMINE 4 MG/ML
SOLUTION/ DROPS OPHTHALMIC
COMMUNITY
End: 2021-08-19 | Stop reason: ALTCHOICE

## 2021-08-19 RX ORDER — OFLOXACIN 3 MG/ML
SOLUTION/ DROPS OPHTHALMIC
COMMUNITY
End: 2021-08-19 | Stop reason: ALTCHOICE

## 2021-08-19 RX ORDER — PREDNISOLONE ACETATE 10 MG/ML
SUSPENSION/ DROPS OPHTHALMIC
COMMUNITY
End: 2021-08-19 | Stop reason: ALTCHOICE

## 2021-08-19 RX ORDER — HYDROCHLOROTHIAZIDE 25 MG/1
1 TABLET ORAL DAILY
COMMUNITY
End: 2021-08-19 | Stop reason: ALTCHOICE

## 2021-08-19 ASSESSMENT — ENCOUNTER SYMPTOMS
COLOR CHANGE: 1
ABDOMINAL PAIN: 0
SHORTNESS OF BREATH: 0
COUGH: 0

## 2021-08-19 NOTE — PROGRESS NOTES
8/19/2021    Yolanda Peraza is a 82 y.o. femalehere for:    HPI:  CC-  Knee pain after fall  Fell on a flat service onto her knees, tripped over wires outside her home on the bricks  Happened 3 days ago  Did not feel dizzy or lightheaded prior to the fall. No chest pain or shortness of breath. Did not hit head or lose consciousness   No numbness or weakness, but now she has knee pain bilaterally. Right is worse than left. Saw swelling of the knee which improved a little bit but is still there and some bruising afterwards bilaterally  Able to bear weight  Patient states that she is actually feeling better, pain has improved as well and the swelling. She was actually debating if she needed to be seen today because she already saw improvement    Patient states she had DEXA scan done 7 years ago and it was normal. Denies any history of fractures  Hx of DVT . Also has osteoarthritis  No blood thinners except for baby aspirin        BP Readings from Last 3 Encounters:   08/19/21 (!) 145/89   08/09/21 (!) 156/93   06/03/21 138/78     Current Outpatient Medications   Medication Sig Dispense Refill    fluticasone (FLONASE) 50 MCG/ACT nasal spray 2 sprays by Each Nostril route daily 3 Bottle 1    cetirizine (ZYRTEC) 5 MG tablet Take 1 tablet by mouth daily 30 tablet 1    gabapentin (NEURONTIN) 100 MG capsule Take 1 capsule by mouth 2 times daily for 90 days. TAKE 1 CAPSULE BY MOUTH  TWICE DAILY 180 capsule 0    famotidine (PEPCID) 20 MG tablet TAKE 1 TABLET BY MOUTH  TWICE DAILY 180 tablet 3    busPIRone (BUSPAR) 5 MG tablet TAKE 1 TABLET DAILY AS    NEEDED.  90 tablet 3    oxybutynin (DITROPAN-XL) 5 MG extended release tablet TAKE 1 TABLET BY MOUTH  EVERY MORNING 90 tablet 3    furosemide (LASIX) 20 MG tablet TAKE 1 TABLET BY MOUTH  DAILY 90 tablet 3    atorvastatin (LIPITOR) 20 MG tablet TAKE 1 TABLET BY MOUTH  DAILY (Patient taking differently: Take 20 mg by mouth nightly ) 90 tablet 3    cloNIDine (CATAPRES) 0.1 MG tablet TAKE 1 TABLET BY MOUTH  TWICE DAILY (Patient taking differently: nightly TAKE 1 TABLET BY MOUTH  TWICE DAILY) 180 tablet 3    enalapril (VASOTEC) 20 MG tablet TAKE 1 TABLET BY MOUTH  TWICE DAILY 180 tablet 3    NIFEdipine (PROCARDIA XL) 30 MG extended release tablet TAKE 1 TABLET BY MOUTH  DAILY 90 tablet 3    levothyroxine (SYNTHROID) 50 MCG tablet TAKE 1 TABLET BY MOUTH  DAILY 90 tablet 3    aspirin 81 MG tablet Take 1 tablet by mouth nightly       guanFACINE (TENEX) 1 MG tablet nightly       Multiple Vitamins-Minerals (PRESERVISION AREDS) TABS Take by mouth 2 times daily      Biotin 46833 MCG TABS Take 1 tablet by mouth every morning       Calcium Polycarbophil (FIBER) 625 MG TABS Take 2 tablets by mouth every morning       nitroGLYCERIN (NITROSTAT) 0.4 MG SL tablet Place 1 tablet under the tongue every 5 minutes as needed for Chest pain (Patient not taking: Reported on 8/19/2021) 25 tablet 3    vitamin B-12 (CYANOCOBALAMIN) 1000 MCG tablet Take 2,500 mcg by mouth every morning Instructed to hold 5 days pre-op (Patient not taking: Reported on 8/19/2021) 30 tablet 5     No current facility-administered medications for this visit.       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 Connie Delaney yearly    Dysphagia     Hyperlipidemia     Hypertension     Hypothyroid     Impaired glucose tolerance     Liver cyst     Lumbosacral radiculopathy at S1 12/2015    left sided    Macular degeneration 6/13    Renal cyst     seen by Dr Anay Recinos; benign    Vertigo 2/2015     Past Surgical History:   Procedure Laterality Date    APPENDECTOMY      CARDIAC SURGERY  2007    double bypass @ [facility] (Dr. Shoshana Wallace)   [address]      right    COLONOSCOPY  2007    Dr Keri Mesa      partial (ovaries remain)    VITRECTOMY Right 11/2/2020    RIGHT EYE PARS PLANA  VITRECTOMY 22 GAUGE performed by Linda Delaney MD at [address] [place] [address] History:      Problem Relation Age of Onset    Heart Disease Mother     Cancer Father         lung       Social History:  Social History     Tobacco Use    Smoking status: Former Smoker    Smokeless tobacco: Never Used    Tobacco comment: quit in her late 19's   Substance Use Topics    Alcohol use: Yes     Comment: rare       Immunization History   Administered Date(s) Administered    COVID-19, Andrea LagosTIM, 30mcg/0.3mL 01/20/2021, 02/10/2021    Influenza 10/04/2011, 10/11/2013    Influenza Vaccine, unspecified formulation 10/04/2011, 10/11/2013    Influenza Virus Vaccine 09/09/2020    Influenza, High Dose (Fluzone 65 yrs and older) 10/04/2011, 10/11/2013, 09/23/2014, 09/08/2015, 11/21/2016, 10/25/2017, 09/27/2018, 09/16/2019    Influenza, High-dose, Quadv, 65 yrs +, IM (Fluzone) 09/09/2020    Pneumococcal Conjugate 13-valent ([id number]) 11/16/2015    Pneumococcal Polysaccharide ([id number]) 07/12/2012, 06/04/2018    Tdap (Boostrix, Adacel) 10/04/2011    Zoster Live (Zostavax) 01/07/2016    Zoster Recombinant (Shingrix) 09/09/2020, 11/12/2020       Review of Systems   Constitutional: Negative for chills and fever. HENT: Negative for congestion. Eyes: Negative for visual disturbance. Respiratory: Negative for cough and shortness of breath. Cardiovascular: Negative for chest pain. Gastrointestinal: Negative for abdominal pain. Musculoskeletal: Positive for arthralgias (Bilateral knee pain after the fall). Negative for gait problem. Skin: Positive for color change (Bruising after the fall). Negative for rash. Neurological: Negative for weakness and numbness.        VS:  BP (!) 145/89   Pulse 60   Temp 97.2 °F (36.2 °C) (Temporal)   Resp 16   Ht 5' 3\" (1.6 m)   Wt 183 lb (83 kg)   LMP  (LMP Unknown)   SpO2 98%   Breastfeeding No   BMI 32.42 kg/m²     Physical Exam  Vitals reviewed. Constitutional:       General: She is not in acute distress. Appearance: Normal appearance. She is not ill-appearing. HENT:      Head: Normocephalic and atraumatic. Cardiovascular:      Rate and Rhythm: Normal rate and regular rhythm. Heart sounds: Normal heart sounds. Pulmonary:      Effort: Pulmonary effort is normal. No respiratory distress. Breath sounds: Normal breath sounds. No wheezing or rhonchi. Abdominal:      General: There is no distension. Palpations: Abdomen is soft. Tenderness: There is no abdominal tenderness. Musculoskeletal:      Cervical back: Normal range of motion and neck supple. No rigidity. Comments: Mild bruising noted over both knees  Mild swelling over right knee. No effusion. No instability. Does have some tenderness on palpation over patella but mostly tenderness even on palpation of the skin/swelling anterior part  Range of motion is intact on both knees  Patient able to bear weight. Not limping/no gait issues   Neurological:      Mental Status: She is alert. Assessment/Plan:  1. Acute pain of right knee  Acute on chronic from the traumatic fall. Reassuring findings on exam with no issues with bearing weight. Already improving clinically. Patient has not even used anything for the pain because she feels much better. Reassurance given and discussed when to come back if new symptoms or worsening symptoms develop. Follow up: As needed if symptoms worsen or fail to improve    Patient agrees with the above stated plan.     Nghia Russell MD  PGY-3 Family Medicine

## 2021-08-19 NOTE — PROGRESS NOTES
Subjective:    Tatiana Mathew is here for bilateral knee pain right greater than the left. She tripped and fell. The swelling that occurred is receding and the pain is less. She was worried about a DVT and this is what brought her in. ROS: Otherwise negative    Patient Active Problem List   Diagnosis    Renal cyst    Coronary artery disease involving native coronary artery of native heart    Hyperlipidemia    Essential hypertension    Neuropathy of lower extremity    CKD (chronic kidney disease) stage 3, GFR 30-59 ml/min (HCC)    Malignant basal cell neoplasm of skin    Paresthesia of foot    Bradycardia on ECG    Paresthesia of left leg    Diastolic dysfunction    Hypothyroid    Vertigo    Pre-syncope    Hx of CABG    Lumbosacral radiculopathy at S1    Bradycardia    Abnormal EKG    Pharyngoesophageal dysphagia    Precordial pain    Hyperparathyroidism ([address]    Aortic ectasia (HCC)    Vitreomacular traction syndrome of right eye    Epiretinal membrane, right eye    Left ventricular hypertrophy    Hyperuricemia    Primary osteoarthritis of both knees       Past medical, surgical, family and social history were reviewed, non-contributory, and unchanged unless otherwise stated. Objective:    BP (!) 145/89   Pulse 60   Temp 97.2 °F (36.2 °C) (Temporal)   Resp 16   Ht 5' 3\" (1.6 m)   Wt 183 lb (83 kg)   LMP  (LMP Unknown)   SpO2 98%   Breastfeeding No   BMI 32.42 kg/m²     Exam is as noted by resident with the following changes, additions or corrections:      Assessment/Plan:        Tatiana Mathew was seen today for fall and knee pain. Diagnoses and all orders for this visit:    Acute pain of right knee           Attending Physician Statement    I have reviewed the chart, including any radiology or labs. I have discussed the case, including pertinent history and exam findings with the resident. I agree with the assessment, plan and orders as documented by the resident.   Please refer to the resident note for additional information.       Electronically signed by Rob Wilkins DO on 8/19/2021 at 11:48 AM

## 2021-08-28 DIAGNOSIS — Z79.899 MEDICATION MANAGEMENT: ICD-10-CM

## 2021-08-28 DIAGNOSIS — E78.2 MIXED HYPERLIPIDEMIA: ICD-10-CM

## 2021-08-28 DIAGNOSIS — E03.9 ACQUIRED HYPOTHYROIDISM: ICD-10-CM

## 2021-08-28 DIAGNOSIS — I10 ESSENTIAL HYPERTENSION: ICD-10-CM

## 2021-08-30 RX ORDER — CLONIDINE HYDROCHLORIDE 0.1 MG/1
TABLET ORAL
Qty: 180 TABLET | Refills: 3 | Status: SHIPPED
Start: 2021-08-30 | End: 2022-08-16

## 2021-08-30 RX ORDER — LEVOTHYROXINE SODIUM 0.05 MG/1
50 TABLET ORAL DAILY
Qty: 90 TABLET | Refills: 3 | Status: SHIPPED
Start: 2021-08-30 | End: 2022-08-16

## 2021-08-30 RX ORDER — OXYBUTYNIN CHLORIDE 5 MG/1
TABLET, EXTENDED RELEASE ORAL
Qty: 90 TABLET | Refills: 3 | Status: SHIPPED
Start: 2021-08-30 | End: 2022-07-06

## 2021-08-30 RX ORDER — FUROSEMIDE 20 MG/1
20 TABLET ORAL DAILY
Qty: 90 TABLET | Refills: 3 | Status: SHIPPED
Start: 2021-08-30 | End: 2022-08-16

## 2021-08-30 RX ORDER — ENALAPRIL MALEATE 20 MG/1
TABLET ORAL
Qty: 180 TABLET | Refills: 3 | Status: SHIPPED
Start: 2021-08-30 | End: 2022-08-16

## 2021-08-30 RX ORDER — ATORVASTATIN CALCIUM 20 MG/1
20 TABLET, FILM COATED ORAL NIGHTLY
Qty: 90 TABLET | Refills: 3 | Status: SHIPPED
Start: 2021-08-30 | End: 2022-08-16

## 2021-08-30 RX ORDER — NIFEDIPINE 30 MG/1
TABLET, EXTENDED RELEASE ORAL
Qty: 90 TABLET | Refills: 3 | Status: SHIPPED
Start: 2021-08-30 | End: 2022-08-16

## 2021-09-02 ENCOUNTER — OFFICE VISIT (OUTPATIENT)
Dept: FAMILY MEDICINE CLINIC | Age: 86
End: 2021-09-02
Payer: MEDICARE

## 2021-09-02 VITALS
BODY MASS INDEX: 32.25 KG/M2 | TEMPERATURE: 97.3 F | WEIGHT: 182 LBS | RESPIRATION RATE: 16 BRPM | HEART RATE: 52 BPM | DIASTOLIC BLOOD PRESSURE: 66 MMHG | HEIGHT: 63 IN | SYSTOLIC BLOOD PRESSURE: 131 MMHG

## 2021-09-02 DIAGNOSIS — M71.50 TRAUMATIC BURSITIS: Primary | ICD-10-CM

## 2021-09-02 PROCEDURE — 1036F TOBACCO NON-USER: CPT | Performed by: FAMILY MEDICINE

## 2021-09-02 PROCEDURE — 1123F ACP DISCUSS/DSCN MKR DOCD: CPT | Performed by: FAMILY MEDICINE

## 2021-09-02 PROCEDURE — 99213 OFFICE O/P EST LOW 20 MIN: CPT | Performed by: FAMILY MEDICINE

## 2021-09-02 PROCEDURE — 1090F PRES/ABSN URINE INCON ASSESS: CPT | Performed by: FAMILY MEDICINE

## 2021-09-02 PROCEDURE — G8400 PT W/DXA NO RESULTS DOC: HCPCS | Performed by: FAMILY MEDICINE

## 2021-09-02 PROCEDURE — G8417 CALC BMI ABV UP PARAM F/U: HCPCS | Performed by: FAMILY MEDICINE

## 2021-09-02 PROCEDURE — G8427 DOCREV CUR MEDS BY ELIG CLIN: HCPCS | Performed by: FAMILY MEDICINE

## 2021-09-02 PROCEDURE — 4040F PNEUMOC VAC/ADMIN/RCVD: CPT | Performed by: FAMILY MEDICINE

## 2021-09-02 RX ORDER — PREDNISONE 20 MG/1
40 TABLET ORAL DAILY
Qty: 8 TABLET | Refills: 0 | Status: SHIPPED | OUTPATIENT
Start: 2021-09-02 | End: 2021-09-06

## 2021-09-02 NOTE — PROGRESS NOTES
CC   Chief Complaint   Patient presents with    Knee Pain     after fall several weeks ago     HPI:   Patient comes in today for the below issue(s). Recheck of knee pain  Seen about 2 weeks ago by colleague- see that note  Lawrence Li onto both knees  ROM has remained intact  Pain and swelling of inferior R knee is still present  No restriction of activity  Seeing bruising now    Left knee sore and swollen but not changing color     Review of Systems  Review of Systems   Constitutional: Negative for fatigue. Gastrointestinal: Negative for nausea and vomiting. Genitourinary: Negative for difficulty urinating. Musculoskeletal: Positive for arthralgias. 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 Kp Araiza yearly    Dysphagia     Hyperlipidemia     Hypertension     Hypothyroid     Impaired glucose tolerance     Liver cyst     Lumbosacral radiculopathy at S1 12/2015    left sided    Macular degeneration 6/13    Renal cyst     seen by Dr Mariam Le; benign    Vertigo 2/2015         PE:  VS:  /66   Pulse 52   Temp 97.3 °F (36.3 °C) (Temporal)   Resp 16   Ht 5' 3\" (1.6 m)   Wt 182 lb (82.6 kg)   LMP  (LMP Unknown)   BMI 32.24 kg/m²   Physical Exam  Vitals reviewed. Constitutional:       Appearance: Normal appearance. She is not toxic-appearing. Musculoskeletal:      Right knee: Swelling, ecchymosis (patellar bursa area), bony tenderness and crepitus present. Normal range of motion. Instability Tests: Anterior drawer test negative. Posterior drawer test negative. Medial David test negative and lateral David test negative. Left knee: Swelling present.  No ecchymosis, bony tenderness or crepitus. Normal range of motion. Instability Tests: Anterior drawer test negative. Posterior drawer test negative. Neurological:      Mental Status: She is alert. Assessment (including specific orders/meds/labs):      Mckenna Souza was seen today for knee pain. Diagnoses and all orders for this visit:    Traumatic bursitis  -     predniSONE (DELTASONE) 20 MG tablet; Take 2 tablets by mouth daily for 4 days        Plan:     Likely diagnosis reviewed  discussed intact ROM and reassuring exam  Suspect traumatic bursitis due to fall. Discussed attempting aspiration. Some concern given duration that aspiration would be of limited utility. Will try oral steroids x 4 days. If not better may need imaging to evaluate further. To update early next week     Counseled regarding above diagnosis, including possible risks and complications,  especially if left uncontrolled. Counseled regarding the possible sideeffects, risks, benefits and alternatives to treatment; patient and/or guardian verbalizes understanding, agrees, feels comfortable with and wishes to proceed with above treatment plan. Call or go to ED immediately if symptoms worsen or persist. Advised patient to call with any new medication issues, and, as applicable, read all Rx info from pharmacy to assure aware ofall possible risks and side effects of medication before taking. Patient and/or guardian given opportunity to ask questions/raise concerns. She verbalized comfort and understanding of instructions. Follow Up:     Return if symptoms worsen or fail to improve. or sooner if the above issues change unexpectedly or new issues develop     This document was prepared at least partially through the use ofvoice recognition software. Although effort is taken to assure the accuracy of this document, it is possible that grammatical, syntax,  or spelling errors may occur.       Electronically signed by Kristina Leonardo MD [facility]

## 2021-09-03 ASSESSMENT — ENCOUNTER SYMPTOMS
NAUSEA: 0
VOMITING: 0

## 2021-09-07 RX ORDER — CEPHALEXIN 250 MG/1
250 CAPSULE ORAL 3 TIMES DAILY
Qty: 21 CAPSULE | Refills: 0 | Status: SHIPPED | OUTPATIENT
Start: 2021-09-07 | End: 2021-09-14

## 2021-09-15 ENCOUNTER — OFFICE VISIT (OUTPATIENT)
Dept: PODIATRY | Age: 86
End: 2021-09-15
Payer: MEDICARE

## 2021-09-15 VITALS — BODY MASS INDEX: 32.25 KG/M2 | WEIGHT: 182 LBS | HEIGHT: 63 IN

## 2021-09-15 DIAGNOSIS — L60.0 INGROWN NAIL: ICD-10-CM

## 2021-09-15 DIAGNOSIS — B35.1 TINEA UNGUIUM: Primary | ICD-10-CM

## 2021-09-15 DIAGNOSIS — M79.674 PAIN OF RIGHT GREAT TOE: ICD-10-CM

## 2021-09-15 PROCEDURE — G8427 DOCREV CUR MEDS BY ELIG CLIN: HCPCS | Performed by: PODIATRIST

## 2021-09-15 PROCEDURE — 1036F TOBACCO NON-USER: CPT | Performed by: PODIATRIST

## 2021-09-15 PROCEDURE — G8417 CALC BMI ABV UP PARAM F/U: HCPCS | Performed by: PODIATRIST

## 2021-09-15 PROCEDURE — G8400 PT W/DXA NO RESULTS DOC: HCPCS | Performed by: PODIATRIST

## 2021-09-15 PROCEDURE — 99203 OFFICE O/P NEW LOW 30 MIN: CPT | Performed by: PODIATRIST

## 2021-09-15 PROCEDURE — 4040F PNEUMOC VAC/ADMIN/RCVD: CPT | Performed by: PODIATRIST

## 2021-09-15 PROCEDURE — 1090F PRES/ABSN URINE INCON ASSESS: CPT | Performed by: PODIATRIST

## 2021-09-15 PROCEDURE — 1123F ACP DISCUSS/DSCN MKR DOCD: CPT | Performed by: PODIATRIST

## 2021-09-15 NOTE — PROGRESS NOTES
Vianey Cabral is here today for nail care. C/o pain to right great toenail.  her PCP is Burton Bales MD last OV was 2021. Vianey Socks : 1935 Sex: female  Age: 82 y.o. Patient was referred by Burton Bales MD    CC:   Likely nail fungus both feet    HPI  This pleasant 47-year-old female patient referred me today likely nail fungus both feet. Denies current topical or oral antifungals. Does have some tenderness right great toenail but no open skin lesions or abrasions. Denies history of diabetes. No additional pedal complaints today. No additional pedal complaints at this time. ROS:  Const: Denies constitutional symptoms  Musculo: Denies symptoms other than stated above  Skin: Denies symptoms other than stated above      Current Outpatient Medications:     ciclopirox (PENLAC) 8 % solution, Apply once daily all toenails. , Disp: 6 mL, Rfl: 2    gabapentin (NEURONTIN) 100 MG capsule, Take 1 capsule by mouth 2 times daily for 90 days. , Disp: 180 capsule, Rfl: 3    furosemide (LASIX) 20 MG tablet, TAKE 1 TABLET BY MOUTH  DAILY, Disp: 90 tablet, Rfl: 3    levothyroxine (SYNTHROID) 50 MCG tablet, TAKE 1 TABLET BY MOUTH  DAILY, Disp: 90 tablet, Rfl: 3    cloNIDine (CATAPRES) 0.1 MG tablet, TAKE 1 TABLET BY MOUTH  TWICE DAILY, Disp: 180 tablet, Rfl: 3    atorvastatin (LIPITOR) 20 MG tablet, Take 1 tablet by mouth nightly, Disp: 90 tablet, Rfl: 3    NIFEdipine (PROCARDIA XL) 30 MG extended release tablet, TAKE 1 TABLET BY MOUTH  DAILY, Disp: 90 tablet, Rfl: 3    oxybutynin (DITROPAN-XL) 5 MG extended release tablet, TAKE 1 TABLET BY MOUTH IN  THE MORNING, Disp: 90 tablet, Rfl: 3    enalapril (VASOTEC) 20 MG tablet, TAKE 1 TABLET BY MOUTH  TWICE DAILY, Disp: 180 tablet, Rfl: 3    fluticasone (FLONASE) 50 MCG/ACT nasal spray, 2 sprays by Each Nostril route daily, Disp: 3 Bottle, Rfl: 1    cetirizine (ZYRTEC) 5 MG tablet, Take 1 tablet by mouth daily, Disp: 30 tablet, Rfl: 1    famotidine (PEPCID) 20 MG tablet, TAKE 1 TABLET BY MOUTH  TWICE DAILY, Disp: 180 tablet, Rfl: 3    busPIRone (BUSPAR) 5 MG tablet, TAKE 1 TABLET DAILY AS    NEEDED., Disp: 90 tablet, Rfl: 3    aspirin 81 MG tablet, Take 1 tablet by mouth nightly , Disp: , Rfl:     guanFACINE (TENEX) 1 MG tablet, nightly , Disp: , Rfl:     Multiple Vitamins-Minerals (PRESERVISION AREDS) TABS, Take by mouth 2 times daily, Disp: , Rfl:     nitroGLYCERIN (NITROSTAT) 0.4 MG SL tablet, Place 1 tablet under the tongue every 5 minutes as needed for Chest pain, Disp: 25 tablet, Rfl: 3    Biotin 78332 MCG TABS, Take 1 tablet by mouth every morning , Disp: , Rfl:     Calcium Polycarbophil (FIBER) 625 MG TABS, Take 2 tablets by mouth every morning , Disp: , Rfl:     vitamin B-12 (CYANOCOBALAMIN) 1000 MCG tablet, Take 2,500 mcg by mouth every morning Instructed to hold 5 days pre-op, Disp: 30 tablet, Rfl: 5  No Known Allergies    Past Medical History:   Diagnosis Date    Aortic ectasia (HCC) 09/2020    3.7 cm a sending aorta; ct reviewed per Dr Andrés Victoria 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 Alysa Valdez yearly    Dysphagia     Hyperlipidemia     Hypertension     Hypothyroid     Impaired glucose tolerance     Liver cyst     Lumbosacral radiculopathy at S1 12/2015    left sided    Macular degeneration 6/13    Renal cyst     seen by Dr Humberto Marcano; benign    Vertigo 2/2015     There were no vitals filed for this visit. Work History/Social History: Foot and ankle history:     Focused Lower Extremity Physical Exam:    Neurovascular examination:    Dorsalis Pedis palpable bilateral.  Posterior tibialis palpable bilateral.    Capillary Refill Time:  Immediate return  Hair growth:  Symmetrical and bilateral   Skin:  Not atrophic  Edema: Mild edema bilateral feet. Mild edema bilateral ankles. Neurologic:  Light touch diminished bilateral.  Warm to coolness bilateral distal toes  Decreased epicritic sensation    Musculoskeletal/ Orthopedic examination:    Equinis: present bilateral  Dorsiflexion, plantarflexion, inversion, eversion bilateral 5 out of 5 muscle strength  Wiggling toes  Negative Homans  Mild tenderness both medial lateral border right great toenail. Dermatology examination:    Toenails 1 through 5 bilateral thickened, elongated, dystrophic, mycotic with subungual debris. Web spaces 1 through 4 bilateral clean dry and intact. No significant hyperkeratotic tissue noted foot or ankle today. No erythema. No open wounds. Assessment and Plan:  Good Lee was seen today for nail problem. Diagnoses and all orders for this visit:    Tinea unguium    Pain of right great toe    Ingrown nail    Other orders  -     ciclopirox (PENLAC) 8 % solution; Apply once daily all toenails. New referral foot ankle exam today. Clinically does present as tinea unguium. I did recommend slant back procedure medial lateral border right great toenail. No clinical signs of infection or ingrown. Did recommend Penlac 8% once daily all nails. I will follow-up in 2 months to monitor overall progression. Return in about 2 months (around 11/15/2021). Seen By:  Sara Pollack DPM      Document was created using voice recognition software. Note was reviewed however may contain grammatical errors.

## 2021-11-17 ENCOUNTER — OFFICE VISIT (OUTPATIENT)
Dept: CARDIOLOGY CLINIC | Age: 86
End: 2021-11-17
Payer: MEDICARE

## 2021-11-17 VITALS
HEART RATE: 43 BPM | DIASTOLIC BLOOD PRESSURE: 60 MMHG | BODY MASS INDEX: 32.6 KG/M2 | WEIGHT: 184 LBS | RESPIRATION RATE: 18 BRPM | SYSTOLIC BLOOD PRESSURE: 120 MMHG | HEIGHT: 63 IN

## 2021-11-17 DIAGNOSIS — Z95.1 HX OF CABG: ICD-10-CM

## 2021-11-17 DIAGNOSIS — R00.1 BRADYCARDIA: ICD-10-CM

## 2021-11-17 DIAGNOSIS — E78.2 MIXED HYPERLIPIDEMIA: ICD-10-CM

## 2021-11-17 DIAGNOSIS — I25.10 CORONARY ARTERY DISEASE INVOLVING NATIVE CORONARY ARTERY OF NATIVE HEART WITHOUT ANGINA PECTORIS: Primary | ICD-10-CM

## 2021-11-17 DIAGNOSIS — I10 ESSENTIAL HYPERTENSION: ICD-10-CM

## 2021-11-17 PROCEDURE — G8417 CALC BMI ABV UP PARAM F/U: HCPCS | Performed by: INTERNAL MEDICINE

## 2021-11-17 PROCEDURE — G8428 CUR MEDS NOT DOCUMENT: HCPCS | Performed by: INTERNAL MEDICINE

## 2021-11-17 PROCEDURE — 99214 OFFICE O/P EST MOD 30 MIN: CPT | Performed by: INTERNAL MEDICINE

## 2021-11-17 PROCEDURE — 93000 ELECTROCARDIOGRAM COMPLETE: CPT | Performed by: INTERNAL MEDICINE

## 2021-11-17 PROCEDURE — 1036F TOBACCO NON-USER: CPT | Performed by: INTERNAL MEDICINE

## 2021-11-17 PROCEDURE — 1123F ACP DISCUSS/DSCN MKR DOCD: CPT | Performed by: INTERNAL MEDICINE

## 2021-11-17 PROCEDURE — 1090F PRES/ABSN URINE INCON ASSESS: CPT | Performed by: INTERNAL MEDICINE

## 2021-11-17 PROCEDURE — 4040F PNEUMOC VAC/ADMIN/RCVD: CPT | Performed by: INTERNAL MEDICINE

## 2021-11-17 PROCEDURE — G8484 FLU IMMUNIZE NO ADMIN: HCPCS | Performed by: INTERNAL MEDICINE

## 2021-11-17 PROCEDURE — G8400 PT W/DXA NO RESULTS DOC: HCPCS | Performed by: INTERNAL MEDICINE

## 2021-11-17 NOTE — PROGRESS NOTES
OUTPATIENT CARDIOLOGY FOLLOW-UP    Name: Kemi Espinosa    Age: 82 y.o. Date of Service: 11/17/2021    Chief Complaint: Follow-up for chest pain, CAD    Referring Physician: Karlie Mei MD    History of Present Illness:   Ms. Carole Dean is a 82 y.o. female who presented to [facility] on 9/4/19 for further evaluation of chest pain. Her PMH is outlined in detail below. She experienced an episode of chest pain at rest on 9/4/19 (mid-sternal, no radiation of the pain, no relationship to exertion, episode lasted 30-45 minutes, \"pressure\", pain subsided prior to coming to the hospital) --> she denies recent chest pain. She denies LOVE, palpitations, orthopnea, or syncope. She is currently with no active cardiac complaints at rest. SB on EKG.     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 Beatris Hays yearly    Dysphagia     Hyperlipidemia     Hypertension     Hypothyroid     Impaired glucose tolerance     Liver cyst     Lumbosacral radiculopathy at S1 12/2015    left sided    Macular degeneration 6/13    Renal cyst     seen by Dr Luis Enrique Munoz; benign    Vertigo 2/2015       Past Surgical History:  Past Surgical History:   Procedure Laterality Date    APPENDECTOMY      CARDIAC SURGERY  2007    double bypass @ [address] (Dr. Jania Portillo)   [facility] CARPAL TUNNEL RELEASE      right    COLONOSCOPY  2007    Dr Andee Goodell      partial (ovaries remain)    VITRECTOMY Right 11/2/2020    RIGHT EYE PARS PLANA  VITRECTOMY 22 GAUGE performed by Martín Nicole MD at [facility] OR     Family History:  Family History   Problem Relation Age of Onset    Heart Disease Mother     Cancer Father         lung       Social History:  Social History     Socioeconomic History    Marital status:      Spouse name: Not on file    Number of children: Not on file    Years of education: Not on file    Highest education level: Not on file   Occupational History    Not on file   Tobacco Use    Smoking status: Former Smoker    Smokeless tobacco: Never Used    Tobacco comment: quit in her late 19's   Vaping Use    Vaping Use: Never used   Substance and Sexual Activity    Alcohol use: Yes     Comment: rare    Drug use: No    Sexual activity: Not Currently   Other Topics Concern    Not on file   Social History Narrative    Not on file     Social Determinants of Health     Financial Resource Strain: Low Risk     Difficulty of Paying Living Expenses: Not hard at all   Food Insecurity: No Food Insecurity    Worried About [address] in the Last Year: Never true    [address] in the Last Year: Never true   Transportation Needs:     Lack of Transportation (Medical): Not on file    Lack of Transportation (Non-Medical):  Not on file   Physical Activity:     Days of Exercise per Week: Not on file    Minutes of Exercise per Session: Not on file   Stress:     Feeling of Stress : Not on file   Social Connections:     Frequency of Communication with Friends and Family: Not on file    Frequency of Social Gatherings with Friends and Family: Not on file    Attends Faith Services: Not on file    Active Member of Clubs or Organizations: Not on file    Attends Club or Organization Meetings: Not on file    Marital Status: Not on file   Intimate Partner Violence:     Fear of Current or Ex-Partner: Not on file    Emotionally Abused: Not on file    Physically Abused: Not on file    Sexually Abused: Not on file   Housing Stability:     Unable to Pay for Housing in the Last Year: Not on file    Number of Armando in the Last Year: Not on file    Unstable Housing in the Last Year: Not on file     Allergies:  No Known Allergies    Current Medications:  Current Outpatient Medications   Medication Sig Dispense Refill    gabapentin (NEURONTIN) 100 MG capsule Take 1 capsule by mouth 2 times daily for 90 days. 180 capsule 3    furosemide (LASIX) 20 MG tablet TAKE 1 TABLET BY MOUTH  DAILY 90 tablet 3    levothyroxine (SYNTHROID) 50 MCG tablet TAKE 1 TABLET BY MOUTH  DAILY 90 tablet 3    cloNIDine (CATAPRES) 0.1 MG tablet TAKE 1 TABLET BY MOUTH  TWICE DAILY 180 tablet 3    atorvastatin (LIPITOR) 20 MG tablet Take 1 tablet by mouth nightly 90 tablet 3    NIFEdipine (PROCARDIA XL) 30 MG extended release tablet TAKE 1 TABLET BY MOUTH  DAILY 90 tablet 3    oxybutynin (DITROPAN-XL) 5 MG extended release tablet TAKE 1 TABLET BY MOUTH IN  THE MORNING 90 tablet 3    enalapril (VASOTEC) 20 MG tablet TAKE 1 TABLET BY MOUTH  TWICE DAILY 180 tablet 3    famotidine (PEPCID) 20 MG tablet TAKE 1 TABLET BY MOUTH  TWICE DAILY 180 tablet 3    busPIRone (BUSPAR) 5 MG tablet TAKE 1 TABLET DAILY AS    NEEDED.  90 tablet 3    aspirin 81 MG tablet Take 1 tablet by mouth nightly       guanFACINE (TENEX) 1 MG tablet nightly       Multiple Vitamins-Minerals (PRESERVISION AREDS) TABS Take by mouth 2 times daily      nitroGLYCERIN (NITROSTAT) 0.4 MG SL tablet Place 1 tablet under the tongue every 5 minutes as needed for Chest pain 25 tablet 3    Biotin 81884 MCG TABS Take 1 tablet by mouth every morning       vitamin B-12 (CYANOCOBALAMIN) 1000 MCG tablet Take 2,500 mcg by mouth every morning Instructed to hold 5 days pre-op 30 tablet 5    ciclopirox (PENLAC) 8 % solution Apply once daily all toenails. 6 mL 2    fluticasone (FLONASE) 50 MCG/ACT nasal spray 2 sprays by Each Nostril route daily (Patient not taking: Reported on 11/17/2021) 3 Bottle 1    cetirizine (ZYRTEC) 5 MG tablet Take 1 tablet by mouth daily 30 tablet 1    Calcium Polycarbophil (FIBER) 625 MG TABS Take 2 tablets by mouth every morning        No current facility-administered medications for this visit. Physical Exam:  /60   Pulse (!) 43   Resp 18   Ht 5' 3\" (1.6 m)   Wt 184 lb (83.5 kg)   LMP  (LMP Unknown)   BMI 32.59 kg/m²   Wt Readings from Last 3 Encounters:   11/17/21 184 lb (83.5 kg)   09/15/21 182 lb (82.6 kg)   09/02/21 182 lb (82.6 kg)     Appearance: Awake, alert, no acute respiratory distress  Skin: Intact, no rash  Head: Normocephalic, atraumatic  Eyes: EOMI, no conjunctival erythema  ENMT: No pharyngeal erythema, MMM, no rhinorrhea  Neck: Supple, no elevated JVP, no carotid bruits  Lungs: Clear to auscultation bilaterally. No wheezes, rales, or rhonchi.   Cardiac: Bradycardic, regular rhythm, +S1S2, no murmurs apparent  Abdomen: Soft, nontender, +bowel sounds  Extremities: Moves all extremities x 4, trace lower extremity edema  Neurologic: No focal motor deficits apparent, normal mood and affect    Laboratory Tests:  Lab Results   Component Value Date    CREATININE 1.1 (H) 08/09/2021    BUN 11 08/09/2021     08/09/2021    K 3.7 08/09/2021     08/09/2021    CO2 27 08/09/2021     Lab Results   Component Value Date     (H) 04/15/2012     Lab Results   Component Value Date    WBC 5.6 05/25/2021    RBC 4.38 05/25/2021    HGB 12.9 05/25/2021    HCT 41.2 05/25/2021    MCV 94.1 05/25/2021    MCH 29.5 05/25/2021    MCHC 31.3 05/25/2021    RDW 15.0 05/25/2021     05/25/2021    MPV 11.8 05/25/2021     Lab Results   Component Value Date    MG 2.0 09/05/2019     Lab Results   Component Value Date    PROTIME 11.1 03/07/2016    PROTIME 11.1 04/15/2012    INR 1.0 03/07/2016 Lab Results   Component Value Date    TSH 2.410 05/25/2021     Lab Results   Component Value Date    TRIG 97 05/25/2021    TRIG 119 09/09/2020    TRIG 99 01/09/2020     Lab Results   Component Value Date    HDL 55 05/25/2021    HDL 48 09/09/2020    HDL 54 01/09/2020     Lab Results   Component Value Date    LDLCALC 55 05/25/2021    LDLCALC 54 09/09/2020    LDLCALC 57 01/09/2020     Lab Results   Component Value Date    MG 2.0 09/05/2019     Lab Results   Component Value Date    CKTOTAL 41 03/08/2016    CKMB 1.5 09/05/2019    TROPONINI <0.01 09/05/2019     Cardiac Tests:  ECG: SB, rate 43, 1st degree AV block, NSSTT changes    Telemetry (9/2019): SB, rate 50's    February 2015 echocardiogram: EF 60%, normal RV function, stage 2 DD, mild valve disease    Echocardiogram: 3/8/16 (read by Dr. David Reyes)  Normal left ventricle size and systolic function  Ejection fraction is visually estimated at 65%. Mild left ventricular concentric hypertrophy noted. The left atrium is mildly dilated. Mild mitral regurgitation is present. Mild tricuspid regurgitation. Abeba Fisherman nuclear stress test: 3/8/16  1. No evidence of pharmacologic stress-induced myocardial   ischemia. 2. No focal wall motion abnormalities are demonstrated. 3. The left ventricular ejection fraction is 68%. Abeba Fisherman nuclear stress test: 1/9/18  1. No reversible perfusion defect   2. Ejection fraction is 62 %. 3. No significant wall motion abnormality     Impression/Plan:  1. Chest pain -- currently CP free, negative troponin x 2 in 9/2019, negative Lexiscan nuclear stress test on 1/9/18  2. CAD/MI s/p CABG in 2007 (LIMA-LAD and SVG-LCx. Negative stress test > 3 years ago per patient and negative Lexiscan nuclear stress test on 3/8/16 and 1/9/18. 3. HTN -- BP today 120/60 (BP at prior office visits 104/64, 124/60); on multiple antihypertensive agents  4. HLD -- on statin  5. Diastolic dysfunction   6.  Hypothyroidism -- normal TSH in 9/2017, 7/2019, 5/2021  7. CKD  8.  History of sinus bradycardia -- coreg dose decreased to 3.125 mg BID in 11/2016 and then subsequently discontinued, previously wore cardiac monitor, HR 43 today    - Results of prior cardiac testing outlined above / discussed options for further CAD work-up (including cardiac catheterization) pending clinical course -- decision made to continue with close monitoring  - Continue current medications (coreg previously discontinued) / again discussed option of adding imdur (she defers unless chest pain occurs more frequently)  - Discussed option of EP evaluation (she denies recent dizziness, lightheadedness, or presyncope/syncope)  - Continue home BP monitoring (home BP log reviewed today -- -150's, HR 40's-70's)    Greater than 30 minutes was spent counseling the patient, reviewing the rationale for the above recommendations and reviewing the patient's current medication list, problem list and results of all previously ordered testing.     Elli Du MD  [facility]) Cardiology

## 2021-11-21 DIAGNOSIS — R13.14 PHARYNGOESOPHAGEAL DYSPHAGIA: ICD-10-CM

## 2021-11-21 DIAGNOSIS — Z79.899 MEDICATION MANAGEMENT: ICD-10-CM

## 2021-11-22 RX ORDER — FAMOTIDINE 20 MG/1
TABLET, FILM COATED ORAL
Qty: 180 TABLET | Refills: 3 | Status: SHIPPED
Start: 2021-11-22 | End: 2022-08-21 | Stop reason: SDUPTHER

## 2021-12-08 ENCOUNTER — PROCEDURE VISIT (OUTPATIENT)
Dept: PODIATRY | Age: 86
End: 2021-12-08
Payer: MEDICARE

## 2021-12-08 VITALS — BODY MASS INDEX: 32.6 KG/M2 | WEIGHT: 184 LBS | HEIGHT: 63 IN

## 2021-12-08 DIAGNOSIS — B35.1 TINEA UNGUIUM: Primary | ICD-10-CM

## 2021-12-08 DIAGNOSIS — L60.0 INGROWN NAIL: ICD-10-CM

## 2021-12-08 DIAGNOSIS — M79.674 PAIN OF RIGHT GREAT TOE: ICD-10-CM

## 2021-12-08 DIAGNOSIS — G60.8 HEREDITARY SENSORY NEUROPATHY: ICD-10-CM

## 2021-12-08 PROCEDURE — G8400 PT W/DXA NO RESULTS DOC: HCPCS | Performed by: PODIATRIST

## 2021-12-08 PROCEDURE — 1123F ACP DISCUSS/DSCN MKR DOCD: CPT | Performed by: PODIATRIST

## 2021-12-08 PROCEDURE — 1090F PRES/ABSN URINE INCON ASSESS: CPT | Performed by: PODIATRIST

## 2021-12-08 PROCEDURE — G8484 FLU IMMUNIZE NO ADMIN: HCPCS | Performed by: PODIATRIST

## 2021-12-08 PROCEDURE — G8417 CALC BMI ABV UP PARAM F/U: HCPCS | Performed by: PODIATRIST

## 2021-12-08 PROCEDURE — 4040F PNEUMOC VAC/ADMIN/RCVD: CPT | Performed by: PODIATRIST

## 2021-12-08 PROCEDURE — 1036F TOBACCO NON-USER: CPT | Performed by: PODIATRIST

## 2021-12-08 PROCEDURE — G8427 DOCREV CUR MEDS BY ELIG CLIN: HCPCS | Performed by: PODIATRIST

## 2021-12-08 PROCEDURE — 99213 OFFICE O/P EST LOW 20 MIN: CPT | Performed by: PODIATRIST

## 2021-12-08 NOTE — PROGRESS NOTES
Patient is in today for nail care. Recheck  great toe. Patient denies pain. Pcp is Amalia Do MD  Last ov 2021        Lissette Young : 1935 Sex: female  Age: 82 y.o. Patient was referred by Amalia Do MD    CC:   Follow-up right great toenail pain and likely nail fungus    HPI  Presents today right great toenail pain and likely nail fungus. Denies any redness or drainage. Denies any pain left the right foot today. Very pleased with progression. States she has been doing very well since last visit. Has been using topical Penlac 8% tolerating well. No additional pedal complaints today. ROS:  Const: Denies constitutional symptoms  Musculo: Denies symptoms other than stated above  Skin: Denies symptoms other than stated above      Current Outpatient Medications:     famotidine (PEPCID) 20 MG tablet, TAKE 1 TABLET BY MOUTH  TWICE DAILY, Disp: 180 tablet, Rfl: 3    ciclopirox (PENLAC) 8 % solution, Apply once daily all toenails. , Disp: 6 mL, Rfl: 2    furosemide (LASIX) 20 MG tablet, TAKE 1 TABLET BY MOUTH  DAILY, Disp: 90 tablet, Rfl: 3    levothyroxine (SYNTHROID) 50 MCG tablet, TAKE 1 TABLET BY MOUTH  DAILY, Disp: 90 tablet, Rfl: 3    cloNIDine (CATAPRES) 0.1 MG tablet, TAKE 1 TABLET BY MOUTH  TWICE DAILY, Disp: 180 tablet, Rfl: 3    atorvastatin (LIPITOR) 20 MG tablet, Take 1 tablet by mouth nightly, Disp: 90 tablet, Rfl: 3    NIFEdipine (PROCARDIA XL) 30 MG extended release tablet, TAKE 1 TABLET BY MOUTH  DAILY, Disp: 90 tablet, Rfl: 3    oxybutynin (DITROPAN-XL) 5 MG extended release tablet, TAKE 1 TABLET BY MOUTH IN  THE MORNING, Disp: 90 tablet, Rfl: 3    enalapril (VASOTEC) 20 MG tablet, TAKE 1 TABLET BY MOUTH  TWICE DAILY, Disp: 180 tablet, Rfl: 3    fluticasone (FLONASE) 50 MCG/ACT nasal spray, 2 sprays by Each Nostril route daily, Disp: 3 Bottle, Rfl: 1    cetirizine (ZYRTEC) 5 MG tablet, Take 1 tablet by mouth daily, Disp: 30 tablet, Rfl: 1    busPIRone (BUSPAR) 5 MG tablet, TAKE 1 TABLET DAILY AS    NEEDED., Disp: 90 tablet, Rfl: 3    aspirin 81 MG tablet, Take 1 tablet by mouth nightly , Disp: , Rfl:     guanFACINE (TENEX) 1 MG tablet, nightly , Disp: , Rfl:     Multiple Vitamins-Minerals (PRESERVISION AREDS) TABS, Take by mouth 2 times daily, Disp: , Rfl:     nitroGLYCERIN (NITROSTAT) 0.4 MG SL tablet, Place 1 tablet under the tongue every 5 minutes as needed for Chest pain, Disp: 25 tablet, Rfl: 3    Biotin 08705 MCG TABS, Take 1 tablet by mouth every morning , Disp: , Rfl:     Calcium Polycarbophil (FIBER) 625 MG TABS, Take 2 tablets by mouth every morning , Disp: , Rfl:     vitamin B-12 (CYANOCOBALAMIN) 1000 MCG tablet, Take 2,500 mcg by mouth every morning Instructed to hold 5 days pre-op, Disp: 30 tablet, Rfl: 5    gabapentin (NEURONTIN) 100 MG capsule, Take 1 capsule by mouth 2 times daily for 90 days. 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 Alissa Lucero yearly    Dysphagia     Hyperlipidemia     Hypertension     Hypothyroid     Impaired glucose tolerance     Liver cyst     Lumbosacral radiculopathy at S1 12/2015    left sided    Macular degeneration 6/13    Renal cyst     seen by Dr Darrell Dow; benign    Vertigo 2/2015     There were no vitals filed for this visit. Work History/Social History: Foot and ankle history:     Focused Lower Extremity Physical Exam:    Neurovascular examination:    Dorsalis Pedis palpable bilateral.  Posterior tibialis palpable bilateral.    Capillary Refill Time:  Immediate return  Hair growth:  Symmetrical and bilateral   Skin:  Not atrophic  Edema: Mild edema bilateral feet. Mild edema bilateral ankles. Neurologic:  Light touch diminished bilateral.  Warm to coolness bilateral distal toes  Decreased epicritic sensation    Musculoskeletal/ Orthopedic examination:    Equinis: present bilateral  Dorsiflexion, plantarflexion, inversion, eversion bilateral 5 out of 5 muscle strength  Wiggling toes  Negative Homans  No significant tenderness right great toe none today. Dermatology examination: Thickened mycotic right great toenail. Proximal clearing noted. Mild incurvation medial and lateral noted. No erythema. No open wounds. Assessment and Plan:  Jorden Ruvalcaba was seen today for nail problem. Diagnoses and all orders for this visit:    Tinea unguium    Pain of right great toe    Ingrown nail    Hereditary sensory neuropathy      I did perform slant back procedure medial and lateral border right great toenail. Tolerated procedure well. Pain-free progressing well. Continue topical Penlac 8% once daily. Proper application discussed. I will follow-up 4 months to monitor progression. Return in about 4 months (around 4/8/2022). Seen By:  Harleen Agosto DPM      Document was created using voice recognition software. Note was reviewed however may contain grammatical errors.

## 2022-03-02 ENCOUNTER — OFFICE VISIT (OUTPATIENT)
Dept: FAMILY MEDICINE CLINIC | Age: 87
End: 2022-03-02
Payer: MEDICARE

## 2022-03-02 VITALS
RESPIRATION RATE: 14 BRPM | TEMPERATURE: 97.9 F | SYSTOLIC BLOOD PRESSURE: 134 MMHG | DIASTOLIC BLOOD PRESSURE: 78 MMHG | HEART RATE: 54 BPM | WEIGHT: 186 LBS | HEIGHT: 63 IN | OXYGEN SATURATION: 98 % | BODY MASS INDEX: 32.96 KG/M2

## 2022-03-02 DIAGNOSIS — I10 ESSENTIAL HYPERTENSION: Primary | ICD-10-CM

## 2022-03-02 DIAGNOSIS — N18.30 STAGE 3 CHRONIC KIDNEY DISEASE, UNSPECIFIED WHETHER STAGE 3A OR 3B CKD (HCC): ICD-10-CM

## 2022-03-02 DIAGNOSIS — H53.8 BLURRED VISION: ICD-10-CM

## 2022-03-02 DIAGNOSIS — E21.3 HYPERPARATHYROIDISM (HCC): ICD-10-CM

## 2022-03-02 DIAGNOSIS — E03.9 ACQUIRED HYPOTHYROIDISM: ICD-10-CM

## 2022-03-02 DIAGNOSIS — I10 ESSENTIAL HYPERTENSION: ICD-10-CM

## 2022-03-02 DIAGNOSIS — R00.1 BRADYCARDIA: ICD-10-CM

## 2022-03-02 DIAGNOSIS — I77.819 AORTIC ECTASIA (HCC): ICD-10-CM

## 2022-03-02 DIAGNOSIS — Z79.899 MEDICATION MANAGEMENT: ICD-10-CM

## 2022-03-02 LAB
BASOPHILS ABSOLUTE: 0.05 E9/L (ref 0–0.2)
BASOPHILS RELATIVE PERCENT: 0.8 % (ref 0–2)
EOSINOPHILS ABSOLUTE: 0.29 E9/L (ref 0.05–0.5)
EOSINOPHILS RELATIVE PERCENT: 4.5 % (ref 0–6)
HCT VFR BLD CALC: 43.7 % (ref 34–48)
HEMOGLOBIN: 13.3 G/DL (ref 11.5–15.5)
IMMATURE GRANULOCYTES #: 0.01 E9/L
IMMATURE GRANULOCYTES %: 0.2 % (ref 0–5)
LYMPHOCYTES ABSOLUTE: 2.21 E9/L (ref 1.5–4)
LYMPHOCYTES RELATIVE PERCENT: 34.1 % (ref 20–42)
MCH RBC QN AUTO: 28.9 PG (ref 26–35)
MCHC RBC AUTO-ENTMCNC: 30.4 % (ref 32–34.5)
MCV RBC AUTO: 94.8 FL (ref 80–99.9)
MONOCYTES ABSOLUTE: 0.54 E9/L (ref 0.1–0.95)
MONOCYTES RELATIVE PERCENT: 8.3 % (ref 2–12)
NEUTROPHILS ABSOLUTE: 3.38 E9/L (ref 1.8–7.3)
NEUTROPHILS RELATIVE PERCENT: 52.1 % (ref 43–80)
PDW BLD-RTO: 14.7 FL (ref 11.5–15)
PLATELET # BLD: 224 E9/L (ref 130–450)
PMV BLD AUTO: 12.2 FL (ref 7–12)
RBC # BLD: 4.61 E12/L (ref 3.5–5.5)
WBC # BLD: 6.5 E9/L (ref 4.5–11.5)

## 2022-03-02 PROCEDURE — 1123F ACP DISCUSS/DSCN MKR DOCD: CPT | Performed by: FAMILY MEDICINE

## 2022-03-02 PROCEDURE — G8417 CALC BMI ABV UP PARAM F/U: HCPCS | Performed by: FAMILY MEDICINE

## 2022-03-02 PROCEDURE — G8484 FLU IMMUNIZE NO ADMIN: HCPCS | Performed by: FAMILY MEDICINE

## 2022-03-02 PROCEDURE — 4040F PNEUMOC VAC/ADMIN/RCVD: CPT | Performed by: FAMILY MEDICINE

## 2022-03-02 PROCEDURE — 1036F TOBACCO NON-USER: CPT | Performed by: FAMILY MEDICINE

## 2022-03-02 PROCEDURE — 1090F PRES/ABSN URINE INCON ASSESS: CPT | Performed by: FAMILY MEDICINE

## 2022-03-02 PROCEDURE — 99214 OFFICE O/P EST MOD 30 MIN: CPT | Performed by: FAMILY MEDICINE

## 2022-03-02 PROCEDURE — G8400 PT W/DXA NO RESULTS DOC: HCPCS | Performed by: FAMILY MEDICINE

## 2022-03-02 PROCEDURE — G8427 DOCREV CUR MEDS BY ELIG CLIN: HCPCS | Performed by: FAMILY MEDICINE

## 2022-03-02 RX ORDER — BUSPIRONE HYDROCHLORIDE 5 MG/1
TABLET ORAL
Qty: 90 TABLET | Refills: 3 | Status: SHIPPED
Start: 2022-03-02 | End: 2022-08-21 | Stop reason: SDUPTHER

## 2022-03-02 ASSESSMENT — ENCOUNTER SYMPTOMS
ABDOMINAL PAIN: 0
CONSTIPATION: 0
TROUBLE SWALLOWING: 0

## 2022-03-02 NOTE — PROGRESS NOTES
CC   Chief Complaint   Patient presents with    Hypertension    Blurred Vision     several months     HPI:   Patient comes in today for the below issue(s). Routine follow-up of her chronic health issues. Vision problems  States has known macular degeneration  Feels acuity is declining and becoming blurry  Gradual onset  States saw eye doctor and was reassured that acuity is normal  Meds are reviewed. Several meds she takes can have an effect on vision but most notable is her Ditropan. Cardiac issues-CAD, hypertension, aortic ectasia, hypertension, hyperlipidemia, diastolic dysfunction  She reports doing relatively well  States she is compliant with her medications  Saw cardiology in the fall and was given a 1 year follow-up  Denies chest pains  Blood pressure logs are again provided. Blood pressures seem reasonably well controlled. The bradycardia issues are ongoing, but she is now having some relative doubling of her heart rate at times. This does not fall into the tachycardic range, but it does represent a significant change from typical HR. Not dizzy. No syncope or near syncope  Lower extremity swelling is stable  Aortic ectasia needs followed up    CKD and secondary hyperparathyroidism with hypercalcemia  Follow-up  Sees nephrology  No concerns raised otherwise    Orthopedic issues  Chronic arthralgias of the knees, chronic back pain  Issues are chronic, unchanged - no new concerns  Not ready to consider orthopedic interventions      Review of Systems  Review of Systems   Constitutional: Positive for fatigue. Negative for chills and fever. HENT: Negative for trouble swallowing. Gastrointestinal: Negative for abdominal pain and constipation. Genitourinary: Negative for difficulty urinating.      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 Lindy Quiles yearly    Dysphagia     Hyperlipidemia     Hypertension     Hypothyroid     Impaired glucose tolerance     Liver cyst     Lumbosacral radiculopathy at S1 12/2015    left sided    Macular degeneration 6/13    Renal cyst     seen by Dr Kiya Duvall; benign    Vertigo 2/2015         PE:  VS:  /78   Pulse 54   Temp 97.9 °F (36.6 °C) (Temporal)   Resp 14   Ht 5' 3\" (1.6 m)   Wt 186 lb (84.4 kg)   LMP  (LMP Unknown)   SpO2 98%   BMI 32.95 kg/m²   Physical Exam  Vitals reviewed. Constitutional:       Appearance: Normal appearance. She is not toxic-appearing. HENT:      Head: Normocephalic and atraumatic. Nose: Nose normal. No congestion. Mouth/Throat:      Mouth: Mucous membranes are moist.      Pharynx: Oropharynx is clear. Eyes:      Conjunctiva/sclera: Conjunctivae normal.   Neck:      Vascular: No carotid bruit. Cardiovascular:      Rate and Rhythm: Normal rate and regular rhythm. Pulses: Normal pulses. Heart sounds: No murmur heard. Pulmonary:      Breath sounds: Normal breath sounds. No wheezing, rhonchi or rales. Abdominal:      General: Bowel sounds are normal. There is no distension. Tenderness: There is no abdominal tenderness. Musculoskeletal:      Cervical back: Neck supple. Right lower leg: Edema (Trace) present. Left lower leg: Edema (1+) present. Lymphadenopathy:      Cervical: No cervical adenopathy. Skin:     General: Skin is warm and dry. Neurological:      General: No focal deficit present. Mental Status: She is alert. Psychiatric:         Mood and Affect: Mood normal.         Behavior: Behavior normal.           Assessment (including specific orders/meds/labs):      [employer] was seen today for hypertension and blurred vision.     Diagnoses and all orders for this visit:    Essential hypertension  -     CBC with Auto Differential; Future  -     Comprehensive Metabolic Panel; Future  -     Lipid Panel; Future    Blurred vision    Aortic ectasia (HCC)  -     CT CHEST WO CONTRAST; Future    Hyperparathyroidism ([facility] [address]  -     Comprehensive Metabolic Panel; Future    Stage 3 chronic kidney disease, unspecified whether stage 3a or 3b CKD ([facility] [address]  -     Comprehensive Metabolic Panel; Future    Acquired hypothyroidism  -     TSH; Future    Medication management  -     busPIRone (BUSPAR) 5 MG tablet; TAKE 1 TABLET DAILY AS    NEEDED. Bradycardia        Plan:     Vision issues may be related to Ditropan. Advised holding for now, see if it improves over the next 7 to 14 days. Will check status of thyroid. Blood pressure seems acceptable. The fluctuating pulse is somewhat concerning. If it persists, she may be progressing towards relative tachybradycardia syndrome. I would have her return to see cardiology    Kidney issues including stage III kidney disease and hyperparathyroidism are clinically stable. She follows with nephrology    CT of the chest to further evaluate the aortic ectasia    Call or go to ED immediately if symptoms worsen or persist. Advised patient to call with any new medication issues, and, as applicable, read all Rx info from pharmacy to assure aware ofall possible risks and side effects of medication before taking. Patient and/or guardian given opportunity to ask questions/raise concerns. She verbalized comfort and understanding of instructions. Follow Up:     Return in about 4 months (around 7/2/2022), or if symptoms worsen or fail to improve, for Follow-up chronic issues. or sooner if the above issues change unexpectedly or new issues develop     This document was prepared at least partially through the use of[employer] recognition software.  Although effort is taken to assure the accuracy of this document, it is possible that grammatical, syntax, or spelling errors may occur.       Electronically signed by Jennifer Jaramillo MD [facility]

## 2022-03-03 ENCOUNTER — PATIENT MESSAGE (OUTPATIENT)
Dept: FAMILY MEDICINE CLINIC | Age: 87
End: 2022-03-03

## 2022-03-03 LAB
ALBUMIN SERPL-MCNC: 4 G/DL (ref 3.5–5.2)
ALP BLD-CCNC: 157 U/L (ref 35–104)
ALT SERPL-CCNC: 31 U/L (ref 0–32)
ANION GAP SERPL CALCULATED.3IONS-SCNC: 9 MMOL/L (ref 7–16)
AST SERPL-CCNC: 28 U/L (ref 0–31)
BILIRUB SERPL-MCNC: 0.2 MG/DL (ref 0–1.2)
BUN BLDV-MCNC: 31 MG/DL (ref 6–23)
CALCIUM SERPL-MCNC: 9.3 MG/DL (ref 8.6–10.2)
CHLORIDE BLD-SCNC: 102 MMOL/L (ref 98–107)
CHOLESTEROL, TOTAL: 143 MG/DL (ref 0–199)
CO2: 28 MMOL/L (ref 22–29)
CREAT SERPL-MCNC: 1.4 MG/DL (ref 0.5–1)
GFR AFRICAN AMERICAN: 43
GFR NON-AFRICAN AMERICAN: 36 ML/MIN/1.73
GLUCOSE BLD-MCNC: 101 MG/DL (ref 74–99)
HDLC SERPL-MCNC: 67 MG/DL
LDL CHOLESTEROL CALCULATED: 59 MG/DL (ref 0–99)
POTASSIUM SERPL-SCNC: 5 MMOL/L (ref 3.5–5)
SODIUM BLD-SCNC: 139 MMOL/L (ref 132–146)
TOTAL PROTEIN: 7.1 G/DL (ref 6.4–8.3)
TRIGL SERPL-MCNC: 83 MG/DL (ref 0–149)
TSH SERPL DL<=0.05 MIU/L-ACNC: 2.72 UIU/ML (ref 0.27–4.2)
VLDLC SERPL CALC-MCNC: 17 MG/DL

## 2022-03-03 NOTE — TELEPHONE ENCOUNTER
From: Justa Coker  To: Dr. Shmuel Barahona: 3/3/2022 7:44 AM EST  Subject: Test results     Not real happy with some of the test results. How do you feel about them? Should I be doing somewething different?    Thank you,  Jennifer Kim

## 2022-03-18 ENCOUNTER — HOSPITAL ENCOUNTER (OUTPATIENT)
Dept: CT IMAGING | Age: 87
Discharge: HOME | End: 2022-03-20
Payer: MEDICARE

## 2022-03-18 DIAGNOSIS — I77.819 AORTIC ECTASIA (HCC): ICD-10-CM

## 2022-03-18 PROCEDURE — 71250 CT THORAX DX C-: CPT

## 2022-04-13 ENCOUNTER — PROCEDURE VISIT (OUTPATIENT)
Dept: PODIATRY | Age: 87
End: 2022-04-13
Payer: MEDICARE

## 2022-04-13 DIAGNOSIS — L60.0 INGROWN NAIL: ICD-10-CM

## 2022-04-13 DIAGNOSIS — L84 CORNS AND CALLOSITIES: ICD-10-CM

## 2022-04-13 DIAGNOSIS — B35.1 TINEA UNGUIUM: Primary | ICD-10-CM

## 2022-04-13 DIAGNOSIS — M79.674 PAIN OF RIGHT GREAT TOE: ICD-10-CM

## 2022-04-13 DIAGNOSIS — G60.8 HEREDITARY SENSORY NEUROPATHY: ICD-10-CM

## 2022-04-13 PROCEDURE — G8400 PT W/DXA NO RESULTS DOC: HCPCS | Performed by: PODIATRIST

## 2022-04-13 PROCEDURE — 99213 OFFICE O/P EST LOW 20 MIN: CPT | Performed by: PODIATRIST

## 2022-04-13 PROCEDURE — G8417 CALC BMI ABV UP PARAM F/U: HCPCS | Performed by: PODIATRIST

## 2022-04-13 PROCEDURE — 1090F PRES/ABSN URINE INCON ASSESS: CPT | Performed by: PODIATRIST

## 2022-04-13 PROCEDURE — G8427 DOCREV CUR MEDS BY ELIG CLIN: HCPCS | Performed by: PODIATRIST

## 2022-04-13 PROCEDURE — 1123F ACP DISCUSS/DSCN MKR DOCD: CPT | Performed by: PODIATRIST

## 2022-04-13 PROCEDURE — 4040F PNEUMOC VAC/ADMIN/RCVD: CPT | Performed by: PODIATRIST

## 2022-04-13 PROCEDURE — 1036F TOBACCO NON-USER: CPT | Performed by: PODIATRIST

## 2022-04-13 NOTE — PROGRESS NOTES
Daniel Cox is here today for nail care. her PCP is Isa Duval MD last OV was 03/02/2022. CC:   Follow-up right great toenail pain    HPI  Presents today follow-up right great toenail pain. Has been using topical Penlac 8%. Denies any pain today. She is very pleased with progression. States she did have her nails done recently by her  and has been pain-free. Has still been using topical Penlac 8% and noticed improvement. No additional pedal complaints. ROS:  Const: Denies constitutional symptoms  Musculo: Denies symptoms other than stated above  Skin: Denies symptoms other than stated above      Current Outpatient Medications:     ondansetron (ZOFRAN) 4 MG tablet, Take 1 tablet by mouth 3 times daily as needed for Nausea or Vomiting, Disp: 30 tablet, Rfl: 0    busPIRone (BUSPAR) 5 MG tablet, TAKE 1 TABLET DAILY AS    NEEDED., Disp: 90 tablet, Rfl: 3    famotidine (PEPCID) 20 MG tablet, TAKE 1 TABLET BY MOUTH  TWICE DAILY, Disp: 180 tablet, Rfl: 3    ciclopirox (PENLAC) 8 % solution, Apply once daily all toenails. , Disp: 6 mL, Rfl: 2    gabapentin (NEURONTIN) 100 MG capsule, Take 1 capsule by mouth 2 times daily for 90 days. , Disp: 180 capsule, Rfl: 3    furosemide (LASIX) 20 MG tablet, TAKE 1 TABLET BY MOUTH  DAILY, Disp: 90 tablet, Rfl: 3    levothyroxine (SYNTHROID) 50 MCG tablet, TAKE 1 TABLET BY MOUTH  DAILY, Disp: 90 tablet, Rfl: 3    cloNIDine (CATAPRES) 0.1 MG tablet, TAKE 1 TABLET BY MOUTH  TWICE DAILY, Disp: 180 tablet, Rfl: 3    atorvastatin (LIPITOR) 20 MG tablet, Take 1 tablet by mouth nightly, Disp: 90 tablet, Rfl: 3    NIFEdipine (PROCARDIA XL) 30 MG extended release tablet, TAKE 1 TABLET BY MOUTH  DAILY, Disp: 90 tablet, Rfl: 3    oxybutynin (DITROPAN-XL) 5 MG extended release tablet, TAKE 1 TABLET BY MOUTH IN  THE MORNING, Disp: 90 tablet, Rfl: 3    enalapril (VASOTEC) 20 MG tablet, TAKE 1 TABLET BY MOUTH  TWICE DAILY, Disp: 180 tablet, Rfl: 3   fluticasone (FLONASE) 50 MCG/ACT nasal spray, 2 sprays by Each Nostril route daily, Disp: 3 Bottle, Rfl: 1    cetirizine (ZYRTEC) 5 MG tablet, Take 1 tablet by mouth daily, Disp: 30 tablet, Rfl: 1    aspirin 81 MG tablet, Take 1 tablet by mouth nightly , Disp: , Rfl:     guanFACINE (TENEX) 1 MG tablet, nightly , Disp: , Rfl:     Multiple Vitamins-Minerals (PRESERVISION AREDS) TABS, Take by mouth 2 times daily, Disp: , Rfl:     nitroGLYCERIN (NITROSTAT) 0.4 MG SL tablet, Place 1 tablet under the tongue every 5 minutes as needed for Chest pain, Disp: 25 tablet, Rfl: 3    Biotin 29258 MCG TABS, Take 1 tablet by mouth every morning , Disp: , Rfl:     Calcium Polycarbophil (FIBER) 625 MG TABS, Take 2 tablets by mouth every morning , Disp: , Rfl:     vitamin B-12 (CYANOCOBALAMIN) 1000 MCG tablet, Take 2,500 mcg by mouth every morning Instructed to hold 5 days pre-op, Disp: 30 tablet, Rfl: 5  No Known Allergies    Past Medical History:   Diagnosis Date    Aortic ectasia (HCC) 09/2020    3.7 cm a sending aorta; ct reviewed per Dr Nelli Catalan 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 Rosario Ferguson yearly    Dysphagia     Hyperlipidemia     Hypertension     Hypothyroid     Impaired glucose tolerance     Liver cyst     Lumbosacral radiculopathy at S1 12/2015    left sided    Macular degeneration 6/13    Renal cyst     seen by Dr Joselin Bhatia; benign    Vertigo 2/2015     There were no vitals filed for this visit. Work History/Social History: Foot and ankle history:     Focused Lower Extremity Physical Exam:    Neurovascular examination:    Dorsalis Pedis palpable bilateral.  Posterior tibialis palpable bilateral.    Capillary Refill Time:  Immediate return  Hair growth:  Symmetrical and bilateral   Skin:  Not atrophic  Edema: Mild edema bilateral feet. Mild edema bilateral ankles. Neurologic:  Light touch diminished bilateral.  Warm to coolness bilateral distal toes  Decreased epicritic sensation    Musculoskeletal/ Orthopedic examination:    Equinis: present bilateral  Dorsiflexion, plantarflexion, inversion, eversion bilateral 5 out of 5 muscle strength  Wiggling toes  Negative Homans  No pain bilateral great toenail. Dermatology examination:    No incurvation medial lateral border right great toenail. There is still mild thickening with mycotic nail left great toenail and right great toenail. Hyperkeratotic tissue medial IPJ great toe bilateral.  No open wounds. Assessment and Plan:  Tatiana Mathew was seen today for callouses and nail problem. Diagnoses and all orders for this visit:    Tinea unguium    Pain of right great toe    Ingrown nail    Hereditary sensory neuropathy    Corns and callosities      Follow-up foot ankle exam  No pain bilateral great toenail today. Topical Penlac 8% as directed. Progressing well. I did pare hyperkeratotic tissue medial IPJ great toe bilateral.  Tolerated well. She is pain-free and progressing well. I will follow-up as needed. Return in about 2 months (around 6/13/2022). Seen By:  Romelia Bae DPM      Document was created using voice recognition software. Note was reviewed however may contain grammatical errors.

## 2022-04-15 ENCOUNTER — TELEPHONE (OUTPATIENT)
Dept: FAMILY MEDICINE CLINIC | Age: 87
End: 2022-04-15

## 2022-04-15 NOTE — TELEPHONE ENCOUNTER
Manish Morales (son) calling- mom is still not feeling well (see mychart message from 4/14) and would like to know if something can be called in for her. He wanted to have her seen today but we don't have openings in our office- suggested urgent care walk in; stated he does not think his mother will go there to be seen. He'd like a call back to discuss the options he has for her.

## 2022-04-15 NOTE — TELEPHONE ENCOUNTER
I am concerned that she is not feeling better. I would want her to be seen.   I am not comfortable simply calling something in without someone assessing her

## 2022-04-18 ENCOUNTER — TELEPHONE (OUTPATIENT)
Dept: FAMILY MEDICINE CLINIC | Age: 87
End: 2022-04-18

## 2022-04-18 ENCOUNTER — OFFICE VISIT (OUTPATIENT)
Dept: FAMILY MEDICINE CLINIC | Age: 87
End: 2022-04-18
Payer: MEDICARE

## 2022-04-18 VITALS
WEIGHT: 186 LBS | DIASTOLIC BLOOD PRESSURE: 72 MMHG | HEART RATE: 82 BPM | TEMPERATURE: 97.4 F | BODY MASS INDEX: 32.96 KG/M2 | RESPIRATION RATE: 20 BRPM | OXYGEN SATURATION: 94 % | HEIGHT: 63 IN | SYSTOLIC BLOOD PRESSURE: 130 MMHG

## 2022-04-18 DIAGNOSIS — J32.9 SINOBRONCHITIS: Primary | ICD-10-CM

## 2022-04-18 DIAGNOSIS — J40 SINOBRONCHITIS: Primary | ICD-10-CM

## 2022-04-18 DIAGNOSIS — H61.21 EXCESSIVE CERUMEN IN EAR CANAL, RIGHT: ICD-10-CM

## 2022-04-18 PROCEDURE — 1090F PRES/ABSN URINE INCON ASSESS: CPT | Performed by: PHYSICIAN ASSISTANT

## 2022-04-18 PROCEDURE — 4040F PNEUMOC VAC/ADMIN/RCVD: CPT | Performed by: PHYSICIAN ASSISTANT

## 2022-04-18 PROCEDURE — 1036F TOBACCO NON-USER: CPT | Performed by: PHYSICIAN ASSISTANT

## 2022-04-18 PROCEDURE — 99213 OFFICE O/P EST LOW 20 MIN: CPT | Performed by: PHYSICIAN ASSISTANT

## 2022-04-18 PROCEDURE — G8427 DOCREV CUR MEDS BY ELIG CLIN: HCPCS | Performed by: PHYSICIAN ASSISTANT

## 2022-04-18 PROCEDURE — G8417 CALC BMI ABV UP PARAM F/U: HCPCS | Performed by: PHYSICIAN ASSISTANT

## 2022-04-18 PROCEDURE — G8400 PT W/DXA NO RESULTS DOC: HCPCS | Performed by: PHYSICIAN ASSISTANT

## 2022-04-18 PROCEDURE — 1123F ACP DISCUSS/DSCN MKR DOCD: CPT | Performed by: PHYSICIAN ASSISTANT

## 2022-04-18 PROCEDURE — 69210 REMOVE IMPACTED EAR WAX UNI: CPT | Performed by: PHYSICIAN ASSISTANT

## 2022-04-18 RX ORDER — BENZONATATE 200 MG/1
200 CAPSULE ORAL 3 TIMES DAILY PRN
Qty: 15 CAPSULE | Refills: 0 | Status: SHIPPED
Start: 2022-04-18 | End: 2022-09-23

## 2022-04-18 RX ORDER — DOXYCYCLINE HYCLATE 100 MG
100 TABLET ORAL 2 TIMES DAILY
Qty: 20 TABLET | Refills: 0 | Status: SHIPPED | OUTPATIENT
Start: 2022-04-18 | End: 2022-04-28

## 2022-04-18 NOTE — PROGRESS NOTES
Chief Complaint       Cough (x 6 days, nonproductive) and Congestion      History of Present Illness   Source of history provided by:  patient. Gautam Nayak is a 82 y.o. old female presenting to the walk in clinic for evaluation of nonproductive moist sounding cough, chest congestion, decreased hearing in the right ear, nasal congestion, and sinus pressure which has been present for the past 6 days. Denies any fever, chills, loss of taste or smell, CP, dyspnea, LE edema, abdominal pain, vomiting, rash, or lethargy. Denies any hx of asthma, COPD, or tobacco use. Patient denies recent sick exposures. Patient has been vaccinated for COVID-19 including receiving her booster dose. ROS    Unless otherwise stated in this report or unable to obtain because of the patient's clinical or mental status as evidenced by the medical record, this patients's positive and negative responses for Review of Systems, constitutional, psych, eyes, ENT, cardiovascular, respiratory, gastrointestinal, neurological, genitourinary, musculoskeletal, integument systems and systems related to the presenting problem are either stated in the preceding or were not pertinent or were negative for the symptoms and/or complaints related to the medical problem. Past Medical History:  has a past medical history of Aortic ectasia (HCC), Arthritis, Basal cell cancer, Coronary artery disease, Diastolic dysfunction, Dysphagia, Hyperlipidemia, Hypertension, Hypothyroid, Impaired glucose tolerance, Liver cyst, Lumbosacral radiculopathy at S1, Macular degeneration, Renal cyst, and Vertigo. Past Surgical History:  has a past surgical history that includes Hysterectomy; Appendectomy; Colonoscopy (2007); Carpal tunnel release; Cardiac surgery (2007); and vitrectomy (Right, 11/2/2020). Social History:  reports that she has quit smoking. She has never used smokeless tobacco. She reports current alcohol use.  She reports that she does not use drugs. Family History: family history includes Cancer in her father; Heart Disease in her mother. Allergies: Patient has no known allergies. Physical Exam         VS:  /72   Pulse 82   Temp 97.4 °F (36.3 °C) (Temporal)   Resp 20   Ht 5' 3\" (1.6 m)   Wt 186 lb (84.4 kg)   LMP  (LMP Unknown)   SpO2 94%   BMI 32.95 kg/m²    Oxygen Saturation Interpretation: Normal.    Constitutional:  Alert, development consistent with age. NAD. Head:  NC/NT. Airway patent. Moderate TTP noted over the bilateral ethmoid and maxillary sinuses. Right TM not visible due to impacted cerumen. Left TM translucent without any erythema or perforation. Canals without swelling or exudate bilaterally. Mouth: Posterior pharynx with mild erythema and clear postnasal drip. No tonsillar hypertrophy or exudate. Neck:  Normal ROM. Supple. No anterior cervical adenopathy noted. Lungs: CTAB without wheezes, rales, or rhonchi. Cough is harsh and moist sounding. Patient is breathing comfortably on exam without any signs of respiratory distress noted. CV:  Regular rate and rhythm, normal heart sounds, without pathological murmurs, ectopy, gallops, or rubs. Skin:  Normal turgor. Warm, dry, without visible rash. Lymphatic: No lymphangitis or adenopathy noted. Neurological:  Oriented. Motor functions intact. Lab / Imaging Results   (All laboratory and radiology results have been personally reviewed by myself)  Labs:  No results found for this visit on 04/18/22. Imaging: All Radiology results interpreted by Radiologist unless otherwise noted. Assessment / Plan     Impression(s):  Domo Meier was seen today for cough and congestion. Diagnoses and all orders for this visit:    Sinobronchitis  -     doxycycline hyclate (VIBRA-TABS) 100 MG tablet; Take 1 tablet by mouth 2 times daily for 10 days  -     benzonatate (TESSALON) 200 MG capsule;  Take 1 capsule by mouth 3 times daily as needed for Cough    Excessive cerumen in

## 2022-07-06 ENCOUNTER — OFFICE VISIT (OUTPATIENT)
Dept: FAMILY MEDICINE CLINIC | Age: 87
End: 2022-07-06
Payer: MEDICARE

## 2022-07-06 VITALS
HEIGHT: 63 IN | BODY MASS INDEX: 32.07 KG/M2 | RESPIRATION RATE: 16 BRPM | WEIGHT: 181 LBS | TEMPERATURE: 97.9 F | OXYGEN SATURATION: 98 % | HEART RATE: 66 BPM | SYSTOLIC BLOOD PRESSURE: 139 MMHG | DIASTOLIC BLOOD PRESSURE: 70 MMHG

## 2022-07-06 DIAGNOSIS — N18.30 STAGE 3 CHRONIC KIDNEY DISEASE, UNSPECIFIED WHETHER STAGE 3A OR 3B CKD (HCC): ICD-10-CM

## 2022-07-06 DIAGNOSIS — E21.3 HYPERPARATHYROIDISM (HCC): ICD-10-CM

## 2022-07-06 DIAGNOSIS — I10 ESSENTIAL HYPERTENSION: Primary | ICD-10-CM

## 2022-07-06 DIAGNOSIS — I77.819 AORTIC ECTASIA (HCC): ICD-10-CM

## 2022-07-06 DIAGNOSIS — E78.2 MIXED HYPERLIPIDEMIA: ICD-10-CM

## 2022-07-06 DIAGNOSIS — H53.8 BLURRED VISION: ICD-10-CM

## 2022-07-06 DIAGNOSIS — R00.1 BRADYCARDIA: ICD-10-CM

## 2022-07-06 PROCEDURE — 1090F PRES/ABSN URINE INCON ASSESS: CPT | Performed by: FAMILY MEDICINE

## 2022-07-06 PROCEDURE — 1123F ACP DISCUSS/DSCN MKR DOCD: CPT | Performed by: FAMILY MEDICINE

## 2022-07-06 PROCEDURE — G8427 DOCREV CUR MEDS BY ELIG CLIN: HCPCS | Performed by: FAMILY MEDICINE

## 2022-07-06 PROCEDURE — 3288F FALL RISK ASSESSMENT DOCD: CPT | Performed by: FAMILY MEDICINE

## 2022-07-06 PROCEDURE — 99213 OFFICE O/P EST LOW 20 MIN: CPT | Performed by: FAMILY MEDICINE

## 2022-07-06 PROCEDURE — 1036F TOBACCO NON-USER: CPT | Performed by: FAMILY MEDICINE

## 2022-07-06 PROCEDURE — G8417 CALC BMI ABV UP PARAM F/U: HCPCS | Performed by: FAMILY MEDICINE

## 2022-07-06 PROCEDURE — G8400 PT W/DXA NO RESULTS DOC: HCPCS | Performed by: FAMILY MEDICINE

## 2022-07-06 SDOH — ECONOMIC STABILITY: FOOD INSECURITY: WITHIN THE PAST 12 MONTHS, YOU WORRIED THAT YOUR FOOD WOULD RUN OUT BEFORE YOU GOT MONEY TO BUY MORE.: NEVER TRUE

## 2022-07-06 SDOH — ECONOMIC STABILITY: FOOD INSECURITY: WITHIN THE PAST 12 MONTHS, THE FOOD YOU BOUGHT JUST DIDN'T LAST AND YOU DIDN'T HAVE MONEY TO GET MORE.: NEVER TRUE

## 2022-07-06 ASSESSMENT — PATIENT HEALTH QUESTIONNAIRE - PHQ9
SUM OF ALL RESPONSES TO PHQ QUESTIONS 1-9: 0
SUM OF ALL RESPONSES TO PHQ QUESTIONS 1-9: 0
2. FEELING DOWN, DEPRESSED OR HOPELESS: 0
SUM OF ALL RESPONSES TO PHQ QUESTIONS 1-9: 0
1. LITTLE INTEREST OR PLEASURE IN DOING THINGS: 0
SUM OF ALL RESPONSES TO PHQ9 QUESTIONS 1 & 2: 0
SUM OF ALL RESPONSES TO PHQ QUESTIONS 1-9: 0

## 2022-07-06 ASSESSMENT — ENCOUNTER SYMPTOMS
WHEEZING: 0
TROUBLE SWALLOWING: 0
COUGH: 0
ABDOMINAL PAIN: 0
SHORTNESS OF BREATH: 0

## 2022-07-06 ASSESSMENT — SOCIAL DETERMINANTS OF HEALTH (SDOH): HOW HARD IS IT FOR YOU TO PAY FOR THE VERY BASICS LIKE FOOD, HOUSING, MEDICAL CARE, AND HEATING?: NOT HARD AT ALL

## 2022-07-06 NOTE — PROGRESS NOTES
Nadege Leblanc is a 82y.o. year old female Established patient, here for evaluation of the following chief complaint(s):    Chief Complaint   Patient presents with    Hypertension         Shelli Pal was seen today for hypertension. Diagnoses and all orders for this visit:    Essential hypertension    Stage 3 chronic kidney disease, unspecified whether stage 3a or 3b CKD ([address]    Hyperparathyroidism ([address]    Mixed hyperlipidemia    Bradycardia    Aortic ectasia (HCC)    Blurred vision    Blood pressure is controlled overall. She still has fluctuations, but her average numbers appear to be acceptable. Of note is more tachycardia going along with her bradycardia. I have some concern about tachybradycardia syndrome, but would likely require her having a another event monitor or Holter monitor to further assess. I will be in touch with her cardiologist to see if there is any assessment needed prior to her appointment in November. Kidney issues including stage III kidney disease and hyperparathyroidism are stable. Keep follow-up with nephrology    We discussed the aortic ectasia and its progression. We will repeat imaging next year. Signs and symptoms that suggest dissection were reviewed. We discussed the blurred vision being most likely related to macular degeneration. She does not want to restart the Ditropan at this time    Patient/guardian was given the opportunity to ask questions regarding the visit today. Questions were addressed. They verbalized comfort and understanding of the assessment and plan. Return in about 4 months (around 11/6/2022), or if symptoms worsen or fail to improve, for AWV with me (30 min). SUBJECTIVE/OBJECTIVE:    HPI    Vision problems  Tried to hold Ditropan without any benefit  Does have macular degeneration- likely issues are related to this. Continues to have visual field disruption. Still driving.     Sees ophthalmology in a few weeks      Cardiac issues-CAD, hypertension, aortic ectasia, hypertension, hyperlipidemia, diastolic dysfunction  She reports doing relatively well  States she is compliant with her medications  Saw cardiology in the fall and was given a 1 year follow-up  Denies chest pains  Blood pressure logs are again provided. Blood pressures seem reasonably well controlled. Does fluctuate into the 160's SBP but also some in the 100's SBP. The bradycardia issues are ongoing, but she is now having some tachycardia as well- into the low 100's  Does not have any symptoms when too high or too low. Not dizzy. No syncope or near syncope  Lower extremity swelling is stable- still with L>R. Thinks summer time is worse for her swelling. Aortic ectasia checked in winter- mild progression.       CKD and secondary hyperparathyroidism with hypercalcemia  Follow-up  Sees nephrology- no new notes   No concerns raised otherwise     Orthopedic issues  Chronic arthralgias of the knees, chronic back pain  Issues are chronic, unchanged - no new concerns  Not ready to consider orthopedic interventions    Review of Systems   Constitutional: Positive for fatigue. Negative for chills, fever and unexpected weight change. HENT: Negative for trouble swallowing. Respiratory: Negative for cough, shortness of breath and wheezing. Cardiovascular: Positive for leg swelling. Negative for chest pain and palpitations. Gastrointestinal: Negative for abdominal pain. Genitourinary: Negative for difficulty urinating. Objective   /70   Pulse 66   Temp 97.9 °F (36.6 °C) (Temporal)   Resp 16   Ht 5' 3\" (1.6 m)   Wt 181 lb (82.1 kg)   LMP  (LMP Unknown)   SpO2 98%   BMI 32.06 kg/m²     Physical Exam  Vitals reviewed. Constitutional:       Appearance: Normal appearance. She is not toxic-appearing. HENT:      Head: Normocephalic and atraumatic. Eyes:      Conjunctiva/sclera: Conjunctivae normal.   Neck:      Vascular: No carotid bruit.

## 2022-07-06 NOTE — Clinical Note
Our mutual patient is experiencing tachycardia into the low 100s as well as bradycardia into the 40s. She is still asymptomatic. I am not sure if we should be doing anything different with her. Scheduled to see you in November but I did not know if we should get an event monitor or Holter monitor on her sooner.

## 2022-07-20 ENCOUNTER — OFFICE VISIT (OUTPATIENT)
Dept: PODIATRY | Age: 87
End: 2022-07-20
Payer: MEDICARE

## 2022-07-20 DIAGNOSIS — M79.672 LEFT FOOT PAIN: Primary | ICD-10-CM

## 2022-07-20 DIAGNOSIS — M79.672 LEFT FOOT PAIN: ICD-10-CM

## 2022-07-20 DIAGNOSIS — M10.072 ACUTE IDIOPATHIC GOUT OF LEFT FOOT: ICD-10-CM

## 2022-07-20 LAB — URIC ACID, SERUM: 6.2 MG/DL (ref 2.4–5.7)

## 2022-07-20 PROCEDURE — G8417 CALC BMI ABV UP PARAM F/U: HCPCS | Performed by: PODIATRIST

## 2022-07-20 PROCEDURE — 99214 OFFICE O/P EST MOD 30 MIN: CPT | Performed by: PODIATRIST

## 2022-07-20 PROCEDURE — 1090F PRES/ABSN URINE INCON ASSESS: CPT | Performed by: PODIATRIST

## 2022-07-20 PROCEDURE — G8400 PT W/DXA NO RESULTS DOC: HCPCS | Performed by: PODIATRIST

## 2022-07-20 PROCEDURE — 1036F TOBACCO NON-USER: CPT | Performed by: PODIATRIST

## 2022-07-20 PROCEDURE — G8427 DOCREV CUR MEDS BY ELIG CLIN: HCPCS | Performed by: PODIATRIST

## 2022-07-20 PROCEDURE — 1123F ACP DISCUSS/DSCN MKR DOCD: CPT | Performed by: PODIATRIST

## 2022-07-20 RX ORDER — METHYLPREDNISOLONE 4 MG/1
TABLET ORAL
Qty: 1 KIT | Refills: 0 | Status: SHIPPED | OUTPATIENT
Start: 2022-07-20

## 2022-07-20 NOTE — PROGRESS NOTES
Patient in office with c/o left foot pain x 2 days. States she does not recall any injury just woke up an her foot was tender which has progressively worsened. Xrays obtained prior to apt. Tyshawn Villar MD last seen 07/06/2022. CC:   Left great toe pain    HPI  Zayda Jovel presents today for swelling of left great toe. No injury or trauma. States she did wake up with some swelling and tenderness left great toe several days ago. No recent gout flareups. Radiographs left foot obtained today. No current antibiotics or anti-inflammatories. Denies calf pain. No open wounds. ROS:  Const: Denies constitutional symptoms  Musculo: Denies symptoms other than stated above  Skin: Denies symptoms other than stated above      Current Outpatient Medications:     methylPREDNISolone (MEDROL DOSEPACK) 4 MG tablet, Take 1 luiz by mouth as directed, Disp: 1 kit, Rfl: 0    benzonatate (TESSALON) 200 MG capsule, Take 1 capsule by mouth 3 times daily as needed for Cough, Disp: 15 capsule, Rfl: 0    ondansetron (ZOFRAN) 4 MG tablet, Take 1 tablet by mouth 3 times daily as needed for Nausea or Vomiting, Disp: 30 tablet, Rfl: 0    busPIRone (BUSPAR) 5 MG tablet, TAKE 1 TABLET DAILY AS    NEEDED., Disp: 90 tablet, Rfl: 3    famotidine (PEPCID) 20 MG tablet, TAKE 1 TABLET BY MOUTH  TWICE DAILY, Disp: 180 tablet, Rfl: 3    ciclopirox (PENLAC) 8 % solution, Apply once daily all toenails. , Disp: 6 mL, Rfl: 2    gabapentin (NEURONTIN) 100 MG capsule, Take 1 capsule by mouth 2 times daily for 90 days. , Disp: 180 capsule, Rfl: 3    furosemide (LASIX) 20 MG tablet, TAKE 1 TABLET BY MOUTH  DAILY, Disp: 90 tablet, Rfl: 3    levothyroxine (SYNTHROID) 50 MCG tablet, TAKE 1 TABLET BY MOUTH  DAILY, Disp: 90 tablet, Rfl: 3    cloNIDine (CATAPRES) 0.1 MG tablet, TAKE 1 TABLET BY MOUTH  TWICE DAILY, Disp: 180 tablet, Rfl: 3    atorvastatin (LIPITOR) 20 MG tablet, Take 1 tablet by mouth nightly, Disp: 90 tablet, Rfl: 3    NIFEdipine (PROCARDIA XL) 30 MG extended release tablet, TAKE 1 TABLET BY MOUTH  DAILY, Disp: 90 tablet, Rfl: 3    enalapril (VASOTEC) 20 MG tablet, TAKE 1 TABLET BY MOUTH  TWICE DAILY, Disp: 180 tablet, Rfl: 3    fluticasone (FLONASE) 50 MCG/ACT nasal spray, 2 sprays by Each Nostril route daily, Disp: 3 Bottle, Rfl: 1    cetirizine (ZYRTEC) 5 MG tablet, Take 1 tablet by mouth daily, Disp: 30 tablet, Rfl: 1    aspirin 81 MG tablet, Take 1 tablet by mouth nightly , Disp: , Rfl:     guanFACINE (TENEX) 1 MG tablet, nightly , Disp: , Rfl:     Multiple Vitamins-Minerals (PRESERVISION AREDS) TABS, Take by mouth 2 times daily, Disp: , Rfl:     nitroGLYCERIN (NITROSTAT) 0.4 MG SL tablet, Place 1 tablet under the tongue every 5 minutes as needed for Chest pain, Disp: 25 tablet, Rfl: 3    Biotin 60754 MCG TABS, Take 1 tablet by mouth every morning , Disp: , Rfl:     Calcium Polycarbophil (FIBER) 625 MG TABS, Take 2 tablets by mouth every morning  (Patient not taking: Reported on 7/6/2022), Disp: , Rfl:     vitamin B-12 (CYANOCOBALAMIN) 1000 MCG tablet, Take 2,500 mcg by mouth every morning Instructed to hold 5 days pre-op (Patient not taking: Reported on 7/6/2022), Disp: 30 tablet, Rfl: 5  No Known Allergies    Past Medical History:   Diagnosis Date    Aortic ectasia (HCC) 09/2020    3.7 cm a sending aorta; ct reviewed per Dr Dawit Starks 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 Krysta Panchal yearly    Dysphagia     Hyperlipidemia     Hypertension     Hypothyroid     Impaired glucose tolerance     Liver cyst     Lumbosacral radiculopathy at S1 12/2015    left sided    Macular degeneration 6/13    Renal cyst     seen by Dr Irena Rosenberg; benign    Vertigo 2/2015     There were no vitals filed for this visit. Work History/Social History:    Foot and ankle history:     Focused Lower Extremity Physical Exam:    Neurovascular examination:    Dorsalis Pedis palpable bilateral.  Posterior tibialis palpable bilateral.    Capillary Refill Time:  Immediate return  Hair growth:  Symmetrical and bilateral   Skin:  Not atrophic  Edema: Mild edema bilateral feet. Mild edema bilateral ankles. Neurologic:  Light touch diminished bilateral.  Warm to coolness bilateral distal toes  Decreased epicritic sensation    Musculoskeletal/ Orthopedic examination:    Equinis: present bilateral  Dorsiflexion, plantarflexion, inversion, eversion bilateral 5 out of 5 muscle strength  Wiggling toes  Negative Homans  Tenderness to palpation overlying first metatarsal phalangeal joint left great toe. Dermatology examination:    Edema left great toe overlying first metatarsophalangeal joint. No erythema. Assessment and Plan:  Ashley Sr was seen today for foot pain. Diagnoses and all orders for this visit:    Left foot pain  -     XR FOOT LEFT (MIN 3 VIEWS); Future  -     Uric Acid; Future    Acute idiopathic gout of left foot  -     Uric Acid; Future    Other orders  -     methylPREDNISolone (MEDROL DOSEPACK) 4 MG tablet; Take 1 luiz by mouth as directed      Ashley Sr seen today likely gout flare left first metatarsophalangeal joint  Radiographs left foot reviewed    1. No acute fracture or subluxation. Minimal degenerative changes of the   1st MTP joint. 2.  Small inferior calcaneal osteophyte. 3.  Diffuse subcutaneous edema in the distal calf and dorsum of the foot. Uric acid 6.2. Medrol Dosepak take as directed. I will follow-up in office 2 weeks to monitor progression. Would consider cortisone injection if continued symptoms left great toe at level of metatarsophalangeal joint. Return in about 2 weeks (around 8/3/2022). Seen By:  Cherie Lanier DPM      Document was created using voice recognition software. Note was reviewed however may contain grammatical errors.

## 2022-08-15 DIAGNOSIS — Z79.899 MEDICATION MANAGEMENT: ICD-10-CM

## 2022-08-15 DIAGNOSIS — M54.17 LUMBOSACRAL RADICULOPATHY AT S1: ICD-10-CM

## 2022-08-15 DIAGNOSIS — I10 ESSENTIAL HYPERTENSION: ICD-10-CM

## 2022-08-15 DIAGNOSIS — E03.9 ACQUIRED HYPOTHYROIDISM: ICD-10-CM

## 2022-08-15 DIAGNOSIS — E78.2 MIXED HYPERLIPIDEMIA: ICD-10-CM

## 2022-08-16 RX ORDER — NIFEDIPINE 30 MG/1
TABLET, EXTENDED RELEASE ORAL
Qty: 90 TABLET | Refills: 3 | Status: SHIPPED | OUTPATIENT
Start: 2022-08-16

## 2022-08-16 RX ORDER — CLONIDINE HYDROCHLORIDE 0.1 MG/1
TABLET ORAL
Qty: 180 TABLET | Refills: 3 | Status: SHIPPED | OUTPATIENT
Start: 2022-08-16

## 2022-08-16 RX ORDER — FUROSEMIDE 20 MG/1
20 TABLET ORAL DAILY
Qty: 90 TABLET | Refills: 3 | Status: SHIPPED | OUTPATIENT
Start: 2022-08-16

## 2022-08-16 RX ORDER — ENALAPRIL MALEATE 20 MG/1
TABLET ORAL
Qty: 180 TABLET | Refills: 3 | Status: SHIPPED | OUTPATIENT
Start: 2022-08-16

## 2022-08-16 RX ORDER — GABAPENTIN 100 MG/1
CAPSULE ORAL
Qty: 180 CAPSULE | Refills: 3 | OUTPATIENT
Start: 2022-08-16

## 2022-08-16 RX ORDER — GABAPENTIN 100 MG/1
100 CAPSULE ORAL 2 TIMES DAILY
Qty: 180 CAPSULE | Refills: 0 | Status: SHIPPED | OUTPATIENT
Start: 2022-09-16 | End: 2022-12-15

## 2022-08-16 RX ORDER — LEVOTHYROXINE SODIUM 0.05 MG/1
50 TABLET ORAL DAILY
Qty: 90 TABLET | Refills: 3 | Status: SHIPPED | OUTPATIENT
Start: 2022-08-16

## 2022-08-16 RX ORDER — ATORVASTATIN CALCIUM 20 MG/1
TABLET, FILM COATED ORAL
Qty: 90 TABLET | Refills: 3 | Status: SHIPPED | OUTPATIENT
Start: 2022-08-16

## 2022-08-21 DIAGNOSIS — E03.9 ACQUIRED HYPOTHYROIDISM: ICD-10-CM

## 2022-08-21 DIAGNOSIS — Z79.899 MEDICATION MANAGEMENT: ICD-10-CM

## 2022-08-21 DIAGNOSIS — R13.14 PHARYNGOESOPHAGEAL DYSPHAGIA: ICD-10-CM

## 2022-08-21 DIAGNOSIS — I10 ESSENTIAL HYPERTENSION: ICD-10-CM

## 2022-08-21 DIAGNOSIS — E78.2 MIXED HYPERLIPIDEMIA: ICD-10-CM

## 2022-08-22 RX ORDER — FAMOTIDINE 20 MG/1
20 TABLET, FILM COATED ORAL 2 TIMES DAILY
Qty: 180 TABLET | Refills: 3 | Status: SHIPPED | OUTPATIENT
Start: 2022-08-22

## 2022-08-22 RX ORDER — BUSPIRONE HYDROCHLORIDE 5 MG/1
TABLET ORAL
Qty: 90 TABLET | Refills: 3 | Status: SHIPPED | OUTPATIENT
Start: 2022-08-22

## 2022-08-23 RX ORDER — CLONIDINE HYDROCHLORIDE 0.1 MG/1
TABLET ORAL
Qty: 180 TABLET | Refills: 3 | OUTPATIENT
Start: 2022-08-23

## 2022-08-23 RX ORDER — NIFEDIPINE 30 MG/1
TABLET, EXTENDED RELEASE ORAL
Qty: 90 TABLET | Refills: 3 | OUTPATIENT
Start: 2022-08-23

## 2022-08-23 RX ORDER — FUROSEMIDE 20 MG/1
20 TABLET ORAL DAILY
Qty: 90 TABLET | Refills: 3 | OUTPATIENT
Start: 2022-08-23

## 2022-08-23 RX ORDER — ENALAPRIL MALEATE 20 MG/1
TABLET ORAL
Qty: 180 TABLET | Refills: 3 | OUTPATIENT
Start: 2022-08-23

## 2022-08-23 RX ORDER — ATORVASTATIN CALCIUM 20 MG/1
TABLET, FILM COATED ORAL
Qty: 90 TABLET | Refills: 3 | OUTPATIENT
Start: 2022-08-23

## 2022-08-23 RX ORDER — LEVOTHYROXINE SODIUM 0.05 MG/1
50 TABLET ORAL DAILY
Qty: 90 TABLET | Refills: 3 | OUTPATIENT
Start: 2022-08-23

## 2022-09-23 ENCOUNTER — OFFICE VISIT (OUTPATIENT)
Dept: ORTHOPEDIC SURGERY | Age: 87
End: 2022-09-23
Payer: MEDICARE

## 2022-09-23 ENCOUNTER — TELEPHONE (OUTPATIENT)
Dept: FAMILY MEDICINE CLINIC | Age: 87
End: 2022-09-23

## 2022-09-23 VITALS — BODY MASS INDEX: 32.07 KG/M2 | HEIGHT: 63 IN | WEIGHT: 181 LBS

## 2022-09-23 DIAGNOSIS — M25.561 ACUTE PAIN OF RIGHT KNEE: Primary | ICD-10-CM

## 2022-09-23 PROCEDURE — 1123F ACP DISCUSS/DSCN MKR DOCD: CPT | Performed by: PHYSICIAN ASSISTANT

## 2022-09-23 PROCEDURE — 99213 OFFICE O/P EST LOW 20 MIN: CPT | Performed by: PHYSICIAN ASSISTANT

## 2022-09-23 NOTE — PROGRESS NOTES
[facility],[address] In Care  New Patient Note      CHIEF COMPLAINT:   Chief Complaint   Patient presents with    Knee Pain     Pt presents this AM with c/o R knee pain. Pt states that she fell yesterday. Foot was asleep and her leg gave out from beneath her. Pain is surrounding patella, but is mostly posterior and medial. Has not been taking anything for the pain. HISTORY OF PRESENT ILLNESS:                The patient is a 82 y.o. female who presents today with complaints of right knee pain that began yesterday when she fell at home. She believes she may have fallen directly onto the knee but is unsure. She localizes her pain to the anterior and lateral aspect of the right knee. She denies numbness, tingling, loss of sensation or radiation of symptoms into her toes. She denies calf pain. Pain is worse with ambulation, and knee flexion. She has not tried any at home therapies since his injury. She has never injured this knee in the past but does note she has significant osteoarthritis bilaterally.     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 Carl Sahni yearly    Dysphagia     Hyperlipidemia     Hypertension     Hypothyroid     Impaired glucose tolerance     Liver cyst     Lumbosacral radiculopathy at S1 12/2015    left sided    Macular degeneration 6/13    Renal cyst     seen by Dr Miquel Mathew; benign    Vertigo 2/2015     Past Surgical History:        Procedure Laterality Date    APPENDECTOMY      CARDIAC SURGERY  2007    double bypass @ [address] (Dr. Jamie Esposito)    Brina Ramos      right    COLONOSCOPY  2007    Dr Jag Hodgson ([address] [place] [address])      partial (ovaries remain)    VITRECTOMY Right 11/2/2020    RIGHT EYE PARS PLANA  VITRECTOMY 25 GAUGE performed by Mindy Arredondo MD at [facility] OR     Current Medications:   No current facility-administered medications for this visit. Allergies:  Patient has no known allergies. Social History:   TOBACCO:   reports that she has quit smoking. She has never used smokeless tobacco.  ETOH:   reports current alcohol use. DRUGS:   reports no history of drug use. OCCUPATION: Retired    Review of Systems   Constitutional: Negative for fever, chills, diaphoresis, appetite change and fatigue. HENT: Negative for dental issues, hearing loss and tinnitus. Negative for congestion, sinus pressure, sneezing, sore throat. Negative for headache. Eyes: Negative for visual disturbance, blurred and double vision. Negative for pain, discharge, redness and itching  Respiratory: Negative for cough, shortness of breath and wheezing. Cardiovascular: Negative for chest pain, palpitations and leg swelling. No dyspnea on exertion   Gastrointestinal:   Negative for nausea, vomiting, abdominal pain, diarrhea, constipation  or black or bloody. Hematologic\Lymphatic:  negative for bleeding, petechiae,   Genitourinary: Negative for hematuria and difficulty urinating. Musculoskeletal: Negative for neck pain and stiffness. Negative for back pain, joint swelling. + Right knee pain, antalgic gait  Skin: Negative for pallor, rash and wound. Neurological: Negative for dizziness, tremors, seizures, weakness, light-headedness, no TIA or stroke symptoms. No numbness and headaches. Psychiatric/Behavioral: Negative.      Physical Examination:   General appearance: alert, well appearing, and in no distress,  normal appearing weight  Mental status: alert, oriented to person, place, and time, normal mood, behavior, speech, dress, motor activity, and thought processes  Resp:   resp easy and unlabored, no audible wheezes note  Cardiac: distal pulses palpable, skin well perfused  Neurological: alert, oriented X3, normal speech, no focal findings or movement disorder noted, motor and sensory grossly normal bilaterally, normal muscle tone  HEENT: normochephalic atraumatic, external ears and eyes normal, sclera normal, neck supple  Extremities:   peripheral pulses normal, no edema, redness or tenderness in the calves   Skin: normal coloration, no rashes or open wounds, no suspicious skin lesions noted  Psych: Affect euthymic   Musculoskeletal:   On visual inspection there is no obvious deformity to the right knee, no erythema, edema, ecchymosis, open wounds. There is no decreased sensation to light touch throughout the entire lower extremity. Patient is grossly neurovascularly intact. Right Knee: Patient is tender to Palpation in the anterior aspect of the patella most notable on the lateral side. , not tender to palpation elsewhere throughout the knee. Active ROM Flexion 100 with discomfort, Extension -5, MMT 5/5 Flexion, 5/5 Extension  (-) Anterior/Posterior drawer, (-) Lachman, (-) Varus stress test, (-) Valgus stress test, (-) David, (-) Ballotable patella, (-) Patellar grind, (-) Saúl      Ht 5' 3\" (1.6 m)   Wt 181 lb (82.1 kg)   LMP  (LMP Unknown)   BMI 32.06 kg/m²      XR: I did obtain 4 view right knee x-rays in the clinic today which show tricompartmental osteoarthritic changes with no acute findings such as a fracture or dislocation. The imaging will be reviewed and interpreted by Radiologist.  The report was not complete at the time of this note. Please refer to Radiologist report for their interpretation. ASSESSMENT:   Diagnosis Orders   1. Acute pain of right knee  XR KNEE RIGHT (MIN 4 VIEWS)            PLAN:  Patient is a pleasant 28-year-old female who presents to the clinic today for evaluation of right knee pain that began after she fell yesterday at home. On exam she is tender to palpation at the superior lateral border of her patella.   She has limited flexion extension of the knee. Otherwise, the remainder of her knee exam is unremarkable. I did obtain 4 view right knee x-rays in the office today which are negative for an acute finding such as fracture or dislocation but does show tricompartment osteoarthritis which she was aware of. At this time I recommend we treat this conservatively. I did provide her with a home exercise program for knee osteoarthritis. I also gave her a DonJoy reaction knee brace for support and stability with ambulation. She can be weightbearing as tolerated. I did advise her it would be wise to use a walker or a cane for the next few days until her knee pain calms down. We talked about oral medications, she states she would rather take over-the-counter Tylenol for symptomatic relief. I did encourage her to try Voltaren gel over-the-counter. She can apply 4 g twice daily to her knees for symptomatic relief. Patient states she does not tolerate ice. I told her she should not use heat on her knee at this time since there is a acute injury. Patient voiced understanding and agrees with the treatment plan outlined for them in the office today. If they have any additional questions or concerns they should call the office. If the symptoms are not improving or are worsening over the next 7-10 days, patient should return to the clinic for further evaluation. Otherwise, I will see the patient back on a PRN basis. Electronically signed by Brant Acuna PA-C on 9/23/22 at 10:38 AM EDT    **This report was transcribed using voice recognition software. Every effort was made to ensure accuracy; however, inadvertent computerized transcription errors may be present. **

## 2022-09-23 NOTE — TELEPHONE ENCOUNTER
Patients son called in that Castromoutbranden fell yesterday, having a lot of right knee pain, wanted to know if you could order an x-ray for her?

## 2022-10-09 NOTE — TELEPHONE ENCOUNTER
Called back to [facility]; left a detailed message advising that Julieth Cook needs to be seen. Suggested he take her to [facility]. I understand that she is reluctant, but this is what Dr Latosha Aly would like to be done. The patient is a 65y Male complaining of urinary catheter complications.

## 2022-10-10 ENCOUNTER — E-VISIT (OUTPATIENT)
Dept: FAMILY MEDICINE CLINIC | Age: 87
End: 2022-10-10
Payer: MEDICARE

## 2022-10-10 DIAGNOSIS — J06.9 VIRAL URI: Primary | ICD-10-CM

## 2022-10-10 PROBLEM — N25.81 SECONDARY HYPERPARATHYROIDISM OF RENAL ORIGIN (HCC): Status: ACTIVE | Noted: 2022-09-20

## 2022-10-10 PROCEDURE — 99421 OL DIG E/M SVC 5-10 MIN: CPT | Performed by: FAMILY MEDICINE

## 2022-10-10 RX ORDER — CALCITRIOL 0.25 UG/1
CAPSULE, LIQUID FILLED ORAL
COMMUNITY
Start: 2022-09-22

## 2022-10-10 RX ORDER — IPRATROPIUM BROMIDE 42 UG/1
2 SPRAY, METERED NASAL 4 TIMES DAILY PRN
Qty: 15 ML | Refills: 1 | Status: SHIPPED | OUTPATIENT
Start: 2022-10-10

## 2022-10-10 RX ORDER — AMOXICILLIN 500 MG/1
1 TABLET, FILM COATED ORAL 2 TIMES DAILY
Qty: 14 TABLET | Refills: 0 | Status: SHIPPED | OUTPATIENT
Start: 2022-10-10

## 2022-10-10 ASSESSMENT — LIFESTYLE VARIABLES: SMOKING_STATUS: NO, I'VE NEVER SMOKED

## 2022-10-10 NOTE — PROGRESS NOTES
S: Symptoms as reported by patient on questionnaire  O: Not possible due to asynchronous nature of visit  A: URI, possible early sinusitis  P: Symptom relief. Will send in Rx for antibiotics with instructions not to take unless symptoms fail to improve over the next few days. Will indicate possibility of progression to more significant illness or even possible COVID infection.   If symptoms do not improve or if acutely worsens, would need evaluated    Time spent greater than 6 minutes

## 2022-11-08 DIAGNOSIS — M54.17 LUMBOSACRAL RADICULOPATHY AT S1: ICD-10-CM

## 2022-11-08 DIAGNOSIS — Z79.899 MEDICATION MANAGEMENT: ICD-10-CM

## 2022-11-10 ENCOUNTER — OFFICE VISIT (OUTPATIENT)
Dept: FAMILY MEDICINE CLINIC | Age: 87
End: 2022-11-10
Payer: MEDICARE

## 2022-11-10 VITALS
SYSTOLIC BLOOD PRESSURE: 130 MMHG | HEART RATE: 44 BPM | WEIGHT: 184 LBS | RESPIRATION RATE: 16 BRPM | BODY MASS INDEX: 32.6 KG/M2 | OXYGEN SATURATION: 97 % | DIASTOLIC BLOOD PRESSURE: 64 MMHG | HEIGHT: 63 IN | TEMPERATURE: 97.2 F

## 2022-11-10 DIAGNOSIS — I10 ESSENTIAL HYPERTENSION: ICD-10-CM

## 2022-11-10 DIAGNOSIS — Z00.00 MEDICARE ANNUAL WELLNESS VISIT, SUBSEQUENT: Primary | ICD-10-CM

## 2022-11-10 DIAGNOSIS — Z12.31 SCREENING MAMMOGRAM FOR BREAST CANCER: ICD-10-CM

## 2022-11-10 DIAGNOSIS — Z79.899 MEDICATION MANAGEMENT: ICD-10-CM

## 2022-11-10 DIAGNOSIS — M54.17 LUMBOSACRAL RADICULOPATHY AT S1: ICD-10-CM

## 2022-11-10 DIAGNOSIS — N18.30 STAGE 3 CHRONIC KIDNEY DISEASE, UNSPECIFIED WHETHER STAGE 3A OR 3B CKD (HCC): ICD-10-CM

## 2022-11-10 DIAGNOSIS — M17.0 OSTEOARTHRITIS OF BOTH KNEES, UNSPECIFIED OSTEOARTHRITIS TYPE: ICD-10-CM

## 2022-11-10 PROCEDURE — 1123F ACP DISCUSS/DSCN MKR DOCD: CPT | Performed by: FAMILY MEDICINE

## 2022-11-10 PROCEDURE — G8484 FLU IMMUNIZE NO ADMIN: HCPCS | Performed by: FAMILY MEDICINE

## 2022-11-10 PROCEDURE — 3074F SYST BP LT 130 MM HG: CPT | Performed by: FAMILY MEDICINE

## 2022-11-10 PROCEDURE — 3078F DIAST BP <80 MM HG: CPT | Performed by: FAMILY MEDICINE

## 2022-11-10 PROCEDURE — G0439 PPPS, SUBSEQ VISIT: HCPCS | Performed by: FAMILY MEDICINE

## 2022-11-10 RX ORDER — GABAPENTIN 100 MG/1
100 CAPSULE ORAL 2 TIMES DAILY
Qty: 180 CAPSULE | Refills: 0 | Status: SHIPPED | OUTPATIENT
Start: 2022-11-10 | End: 2023-02-08

## 2022-11-10 RX ORDER — GABAPENTIN 100 MG/1
CAPSULE ORAL
Qty: 180 CAPSULE | Refills: 3 | OUTPATIENT
Start: 2022-11-10

## 2022-11-10 ASSESSMENT — LIFESTYLE VARIABLES
HOW MANY STANDARD DRINKS CONTAINING ALCOHOL DO YOU HAVE ON A TYPICAL DAY: 1 OR 2
HOW OFTEN DO YOU HAVE A DRINK CONTAINING ALCOHOL: MONTHLY OR LESS

## 2022-11-10 ASSESSMENT — PATIENT HEALTH QUESTIONNAIRE - PHQ9
SUM OF ALL RESPONSES TO PHQ QUESTIONS 1-9: 0
SUM OF ALL RESPONSES TO PHQ QUESTIONS 1-9: 0
SUM OF ALL RESPONSES TO PHQ9 QUESTIONS 1 & 2: 0
SUM OF ALL RESPONSES TO PHQ QUESTIONS 1-9: 0
2. FEELING DOWN, DEPRESSED OR HOPELESS: 0
SUM OF ALL RESPONSES TO PHQ QUESTIONS 1-9: 0
1. LITTLE INTEREST OR PLEASURE IN DOING THINGS: 0

## 2022-11-10 NOTE — PATIENT INSTRUCTIONS
Personalized Preventive Plan for [facility] - 11/10/2022  Medicare offers a range of preventive health benefits. Some of the tests and screenings are paid in full while other may be subject to a deductible, co-insurance, and/or copay. Some of these benefits include a comprehensive review of your medical history including lifestyle, illnesses that may run in your family, and various assessments and screenings as appropriate. After reviewing your medical record and screening and assessments performed today your provider may have ordered immunizations, labs, imaging, and/or referrals for you. A list of these orders (if applicable) as well as your Preventive Care list are included within your After Visit Summary for your review. Other Preventive Recommendations:    A preventive eye exam performed by an eye specialist is recommended every 1-2 years to screen for glaucoma; cataracts, macular degeneration, and other eye disorders. A preventive dental visit is recommended every 6 months. Try to get at least 150 minutes of exercise per week or 10,000 steps per day on a pedometer . Order or download the FREE \"Exercise & Physical Activity: Your Everyday Guide\" from [employer]. Call 8-676.671.3757 or search [employer] online. You need 5802-7360 mg of calcium and 7428-7645 IU of vitamin D per day. It is possible to meet your calcium requirement with diet alone, but a vitamin D supplement is usually necessary to meet this goal.  When exposed to the sun, use a sunscreen that protects against both UVA and UVB radiation with an SPF of 30 or greater. Reapply every 2 to 3 hours or after sweating, drying off with a towel, or swimming. Always wear a seat belt when traveling in a car. Always wear a helmet when riding a bicycle or motorcycle.

## 2022-11-10 NOTE — PROGRESS NOTES
Medicare Annual Wellness Visit    Reanna Hyde is here for Medicare AWV and Health Maintenance (Declines flu vaccine)    Assessment & Plan   Medicare annual wellness visit, subsequent  Medication management  -     gabapentin (NEURONTIN) 100 MG capsule; Take 1 capsule by mouth 2 times daily for 90 days. , Disp-180 capsule, R-0Normal  Lumbosacral radiculopathy at S1  -     gabapentin (NEURONTIN) 100 MG capsule; Take 1 capsule by mouth 2 times daily for 90 days. , Disp-180 capsule, R-0Normal  Screening mammogram for breast cancer  -     JOSE DIGITAL SCREEN W OR WO CAD BILATERAL; Future  Essential hypertension  Stage 3 chronic kidney disease, unspecified whether stage 3a or 3b CKD (HCC)  Osteoarthritis of both knees, unspecified osteoarthritis type      Chronic issues reviewed, stable  No changes to meds made  Rx management performed. Sees specialists  Declines ortho eval for knees   HM issues overall addressed as above. Shared decision making to continue mammogram.    Controlled substances monitoring: possible medication side effects, risk of tolerance and/or dependence, and alternative treatments discussed, no signs of potential drug abuse or diversion identified, and [place]AR[place] report reviewed today- activity consistent with treatment plan. Recommendations for Preventive Services Due: see orders and patient instructions/AVS.  Recommended screening schedule for the next 5-10 years is provided to the patient in written form: see Patient Instructions/AVS.     Return in 4 months (on 3/10/2023) for Medicare Annual Wellness Visit in 1 year. Subjective     Overall reports no new issues  BP logs from home are reviewed and reassuring. Lipids are UTD  Sees renal re: CKD 3 and secondary hypoparathyroidism . Notes in chart. Chrinci OA pain in knees is ongoing. Declines further work up  LBP with radiculopathy is stable on gabapentin    Needs multiple meds refilled.     Patient's complete Health Risk Assessment and screening values have been reviewed and are found in Flowsheets. The following problems were reviewed today and where indicated follow up appointments were made and/or referrals ordered. Positive Risk Factor Screenings with Interventions:    Fall Risk:  Do you feel unsteady or are you worried about falling? : no  2 or more falls in past year?: no  Fall with injury in past year?: (!) yes   Fall Risk Interventions:    Fall was related to leg falling \"asleep\" and then standing up- gave out but no injury; declines working up further              Health Habits/Nutrition:  Physical Activity: Inactive    Days of Exercise per Week: 0 days    Minutes of Exercise per Session: 0 min     Have you lost any weight without trying in the past 3 months?: No  Body mass index: (!) 32.59  Have you seen the dentist within the past year?: Yes  Health Habits/Nutrition Interventions:  Inadequate physical activity:  patient is not ready to increase his/her physical activity level at this time    Hearing/Vision:  Do you or your family notice any trouble with your hearing that hasn't been managed with hearing aids?: No  Do you have difficulty driving, watching TV, or doing any of your daily activities because of your eyesight?: (!) Yes  Have you had an eye exam within the past year?: Yes  No results found.   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; has limited her driving to in town only    Safety:  Do you have working smoke detectors?: Yes  Do you have any tripping hazards - loose or unsecured carpets or rugs?: No  Do you have any tripping hazards - clutter in doorways, halls, or stairs?: No  Do you have either shower bars, grab bars, non-slip mats or non-slip surfaces in your shower or bathtub?: (!) No  Do all of your stairways have a railing or banister?: Yes  Do you always fasten your seatbelt when you are in a car?: Yes  Safety Interventions:  Home safety tips provided           Objective   Vitals:    11/10/22 1022   BP: 130/64   Pulse: (!) 44   Resp: 16   Temp: 97.2 °F (36.2 °C)   TempSrc: Temporal   SpO2: 97%   Weight: 184 lb (83.5 kg)   Height: 5' 3\" (1.6 m)      Body mass index is 32.59 kg/m². Physical Exam  Vitals reviewed. Constitutional:       Appearance: Normal appearance. She is not toxic-appearing. HENT:      Head: Normocephalic and atraumatic. Eyes:      Conjunctiva/sclera: Conjunctivae normal.   Neck:      Vascular: No carotid bruit. Cardiovascular:      Rate and Rhythm: Normal rate and regular rhythm. Pulses: Normal pulses. Heart sounds: No murmur heard. Pulmonary:      Breath sounds: Normal breath sounds. No wheezing, rhonchi or rales. Abdominal:      General: Bowel sounds are normal. There is no distension. Tenderness: There is no abdominal tenderness. Musculoskeletal:      Cervical back: Neck supple. Right knee: Crepitus present. Decreased range of motion. Tenderness present over the medial joint line. Right lower leg: Edema (1+) present. Left lower leg: Edema (2+) present. Lymphadenopathy:      Cervical: No cervical adenopathy. Skin:     General: Skin is warm and dry. Findings: No rash. Neurological:      General: No focal deficit present. Mental Status: She is alert. Mental status is at baseline. Psychiatric:         Mood and Affect: Mood normal.         Behavior: Behavior normal.           No Known Allergies  Prior to Visit Medications    Medication Sig Taking? Authorizing Provider   gabapentin (NEURONTIN) 100 MG capsule Take 1 capsule by mouth 2 times daily for 90 days. Yes Amna Black MD   calcitRIOL (ROCALTROL) 0.25 MCG capsule take 1 capsule (0.25mcg total) by mouth two times a week.  Yes Historical Provider, MD   ipratropium (ATROVENT) 0.06 % nasal spray 2 sprays by Each Nostril route 4 times daily as needed for Rhinitis (and congestion) Yes Amna Black MD   famotidine (PEPCID) 20 MG tablet Take 1 tablet by mouth 2 times daily Yes Jessica Scales MD   busPIRone (BUSPAR) 5 MG tablet TAKE 1 TABLET DAILY AS    NEEDED. Yes Jessica Scales MD   atorvastatin (LIPITOR) 20 MG tablet TAKE 1 TABLET BY MOUTH AT  NIGHT Yes Jessica Scales MD   furosemide (LASIX) 20 MG tablet TAKE 1 TABLET BY MOUTH  DAILY Yes Jessica Scales MD   enalapril (VASOTEC) 20 MG tablet TAKE 1 TABLET BY MOUTH  TWICE DAILY Yes Jessica Scales MD   levothyroxine (SYNTHROID) 50 MCG tablet TAKE 1 TABLET BY MOUTH  DAILY Yes Jessica Scales MD   cloNIDine (CATAPRES) 0.1 MG tablet TAKE 1 TABLET BY MOUTH  TWICE DAILY Yes Jessica Scales MD   NIFEdipine (PROCARDIA XL) 30 MG extended release tablet TAKE 1 TABLET BY MOUTH  DAILY Yes Jessica Scales MD   ciclopirox (PENLAC) 8 % solution Apply once daily all toenails.  Yes Claudine Landeros DPM   aspirin 81 MG tablet Take 1 tablet by mouth nightly  Yes Historical Provider, MD   guanFACINE (TENEX) 1 MG tablet nightly  Yes Historical Provider, MD   Multiple Vitamins-Minerals (PRESERVISION AREDS) TABS Take by mouth 2 times daily Yes Historical Provider, MD   nitroGLYCERIN (NITROSTAT) 0.4 MG SL tablet Place 1 tablet under the tongue every 5 minutes as needed for Chest pain Yes Jessica Scales MD   Biotin 27764 MCG TABS Take 1 tablet by mouth every morning  Yes Historical Provider, MD   Amoxicillin 500 MG TABS Take 1 tablet by mouth in the morning and at bedtime  Patient not taking: Reported on 11/10/2022  Jessica Scales MD   Calcium Polycarbophil (FIBER) 625 MG TABS Take 2 tablets by mouth every morning   Patient not taking: No sig reported  Historical Provider, MD   vitamin B-12 (CYANOCOBALAMIN) 1000 MCG tablet Take 2,500 mcg by mouth every morning Instructed to hold 5 days pre-op  Patient not taking: No sig reported  Jessica Scales MD       [facility] (Including outside providers/suppliers regularly involved in providing care):   Patient Care Team:  Ryann Valle Pablo Castano MD as PCP - [address] [facility] [address], [place] as PCP - [facility] Empaneled Provider  Yolis Bean MD as Consulting Physician (Cardiac Electrophysiology)  Jaylene Magaña MD as Consulting Physician (Cardiology)  Rockney Severin, MD as Surgeon (Trauma Surgery)     Reviewed and updated this visit:  Tobacco  Allergies  Meds  Med Hx  Surg Hx  Soc Hx  Fam Hx

## 2022-11-15 ENCOUNTER — OFFICE VISIT (OUTPATIENT)
Dept: CARDIOLOGY CLINIC | Age: 87
End: 2022-11-15
Payer: MEDICARE

## 2022-11-15 VITALS
RESPIRATION RATE: 16 BRPM | BODY MASS INDEX: 32.46 KG/M2 | WEIGHT: 183.2 LBS | DIASTOLIC BLOOD PRESSURE: 91 MMHG | HEART RATE: 46 BPM | SYSTOLIC BLOOD PRESSURE: 150 MMHG | HEIGHT: 63 IN

## 2022-11-15 DIAGNOSIS — R00.1 BRADYCARDIA: ICD-10-CM

## 2022-11-15 DIAGNOSIS — I10 ESSENTIAL HYPERTENSION: ICD-10-CM

## 2022-11-15 DIAGNOSIS — Z95.1 HX OF CABG: ICD-10-CM

## 2022-11-15 DIAGNOSIS — I25.10 CORONARY ARTERY DISEASE INVOLVING NATIVE CORONARY ARTERY OF NATIVE HEART WITHOUT ANGINA PECTORIS: Primary | ICD-10-CM

## 2022-11-15 DIAGNOSIS — E78.2 MIXED HYPERLIPIDEMIA: ICD-10-CM

## 2022-11-15 PROCEDURE — 1090F PRES/ABSN URINE INCON ASSESS: CPT | Performed by: INTERNAL MEDICINE

## 2022-11-15 PROCEDURE — G8484 FLU IMMUNIZE NO ADMIN: HCPCS | Performed by: INTERNAL MEDICINE

## 2022-11-15 PROCEDURE — G8400 PT W/DXA NO RESULTS DOC: HCPCS | Performed by: INTERNAL MEDICINE

## 2022-11-15 PROCEDURE — 1036F TOBACCO NON-USER: CPT | Performed by: INTERNAL MEDICINE

## 2022-11-15 PROCEDURE — 93000 ELECTROCARDIOGRAM COMPLETE: CPT | Performed by: INTERNAL MEDICINE

## 2022-11-15 PROCEDURE — 3078F DIAST BP <80 MM HG: CPT | Performed by: INTERNAL MEDICINE

## 2022-11-15 PROCEDURE — G8417 CALC BMI ABV UP PARAM F/U: HCPCS | Performed by: INTERNAL MEDICINE

## 2022-11-15 PROCEDURE — G8427 DOCREV CUR MEDS BY ELIG CLIN: HCPCS | Performed by: INTERNAL MEDICINE

## 2022-11-15 PROCEDURE — 1123F ACP DISCUSS/DSCN MKR DOCD: CPT | Performed by: INTERNAL MEDICINE

## 2022-11-15 PROCEDURE — 3074F SYST BP LT 130 MM HG: CPT | Performed by: INTERNAL MEDICINE

## 2022-11-15 PROCEDURE — 99214 OFFICE O/P EST MOD 30 MIN: CPT | Performed by: INTERNAL MEDICINE

## 2022-11-15 NOTE — PROGRESS NOTES
OUTPATIENT CARDIOLOGY FOLLOW-UP    Name: Yamileth Oakley    Age: 82 y.o. Date of Service: 11/15/2022    Chief Complaint: Follow-up for chest pain, CAD    Referring Physician: Rosy Gmabino MD    History of Present Illness:   Ms. Mami Flores is a 82 y.o. female who presented to [facility] on 9/4/19 for further evaluation of chest pain. Her PMH is outlined in detail below. She experienced an episode of chest pain at rest on 9/4/19 (mid-sternal, no radiation of the pain, no relationship to exertion, episode lasted 30-45 minutes, \"pressure\", pain subsided prior to coming to the hospital) --> she denies recent chest pain. She denies LOVE, palpitations, orthopnea, or syncope. She is currently with no active cardiac complaints at rest. SB on EKG (+history of sinus bradycardia -- \"I can't tell\"). She reports, \"I feel fine\".     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 Clarisse Sanchez yearly    Dysphagia     Hyperlipidemia     Hypertension     Hypothyroid     Impaired glucose tolerance     Liver cyst     Lumbosacral radiculopathy at S1 12/2015    left sided    Macular degeneration 6/13    Renal cyst     seen by Dr Shannon Johnson; benign    Vertigo 2/2015       Past Surgical History:  Past Surgical History:   Procedure Laterality Date    APPENDECTOMY      CARDIAC SURGERY  2007    double bypass @ [facility] (Dr. Meredith Sharpe)    [address]      right    COLONOSCOPY  2007    Dr Yamile Aceves ([facility])      partial (ovaries remain)    VITRECTOMY Right 11/2/2020    RIGHT EYE PARS PLANA  VITRECTOMY 22 GAUGE performed by Trudy Chaudhari MD at [facility] OR     Family History:  Family History   Problem Relation Age of Onset    Heart Disease Mother     Cancer Father         lung       Social History:  Social History     Socioeconomic History    Marital status:      Spouse name: Not on file    Number of children: Not on file    Years of education: Not on file    Highest education level: Not on file   Occupational History    Not on file   Tobacco Use    Smoking status: Former    Smokeless tobacco: Never    Tobacco comments:     quit in her late 19's   Vaping Use    Vaping Use: Never used   Substance and Sexual Activity    Alcohol use: Yes     Comment: rare    Drug use: No    Sexual activity: Not Currently   Other Topics Concern    Not on file   Social History Narrative    Not on file     Social Determinants of Health     Financial Resource Strain: Low Risk     Difficulty of Paying Living Expenses: Not hard at all   Food Insecurity: No Food Insecurity    Worried About Running Out of Food in the Last Year: Never true    Delma of Food in the Last Year: Never true   Transportation Needs: Not on file   Physical Activity: Inactive    Days of Exercise per Week: 0 days    Minutes of Exercise per Session: 0 min   Stress: Not on file   Social Connections: Not on file   Intimate Partner Violence: Not on file   Housing Stability: Not on file     Allergies:  No Known Allergies    Current Medications:  Current Outpatient Medications   Medication Sig Dispense Refill    gabapentin (NEURONTIN) 100 MG capsule Take 1 capsule by mouth 2 times daily for 90 days.  180 capsule 0    calcitRIOL (ROCALTROL) 0.25 MCG capsule take 1 capsule (0.25mcg total) by mouth two times a week.      famotidine (PEPCID) 20 MG tablet Take 1 tablet by mouth 2 times daily 180 tablet 3    busPIRone (BUSPAR) 5 MG tablet TAKE 1 TABLET DAILY AS    NEEDED. 90 tablet 3    atorvastatin (LIPITOR) 20 MG tablet TAKE 1 TABLET BY MOUTH AT  NIGHT 90 tablet 3    furosemide (LASIX) 20 MG tablet TAKE 1 TABLET BY MOUTH  DAILY 90 tablet 3    enalapril (VASOTEC) 20 MG tablet TAKE 1 TABLET BY MOUTH  TWICE DAILY 180 tablet 3    levothyroxine (SYNTHROID) 50 MCG tablet TAKE 1 TABLET BY MOUTH  DAILY 90 tablet 3    cloNIDine (CATAPRES) 0.1 MG tablet TAKE 1 TABLET BY MOUTH  TWICE DAILY 180 tablet 3    NIFEdipine (PROCARDIA XL) 30 MG extended release tablet TAKE 1 TABLET BY MOUTH  DAILY 90 tablet 3    ciclopirox (PENLAC) 8 % solution Apply once daily all toenails. 6 mL 2    aspirin 81 MG tablet Take 1 tablet by mouth nightly       guanFACINE (TENEX) 1 MG tablet nightly       Multiple Vitamins-Minerals (PRESERVISION AREDS) TABS Take by mouth 2 times daily      Biotin 57702 MCG TABS Take 1 tablet by mouth every morning       ipratropium (ATROVENT) 0.06 % nasal spray 2 sprays by Each Nostril route 4 times daily as needed for Rhinitis (and congestion) (Patient not taking: Reported on 11/15/2022) 15 mL 1    Amoxicillin 500 MG TABS Take 1 tablet by mouth in the morning and at bedtime (Patient not taking: Reported on 11/10/2022) 14 tablet 0    nitroGLYCERIN (NITROSTAT) 0.4 MG SL tablet Place 1 tablet under the tongue every 5 minutes as needed for Chest pain (Patient not taking: Reported on 11/15/2022) 25 tablet 3     No current facility-administered medications for this visit.      Physical Exam:  BP (!) 150/91   Pulse (!) 46   Resp 16   Ht 5' 3\" (1.6 m)   Wt 183 lb 3.2 oz (83.1 kg)   LMP  (LMP Unknown)   BMI 32.45 kg/m²   Wt Readings from Last 3 Encounters:   11/15/22 183 lb 3.2 oz (83.1 kg)   11/10/22 184 lb (83.5 kg)   09/23/22 181 lb (82.1 kg)     Appearance: Awake, alert, no acute respiratory distress  Skin: Intact, no rash  Head: Normocephalic, atraumatic  Eyes: EOMI, no conjunctival erythema  ENMT: No pharyngeal erythema, MMM, no rhinorrhea  Neck: Supple, no elevated JVP, no carotid bruits  Lungs: Clear to auscultation bilaterally. No wheezes, rales, or rhonchi.   Cardiac: Bradycardic, regular rhythm, +S1S2, no murmurs apparent  Abdomen: Soft, nontender, +bowel sounds  Extremities: Moves all extremities x 4, trace lower extremity edema  Neurologic: No focal motor deficits apparent, normal mood and affect    Laboratory Tests:  Lab Results   Component Value Date    CREATININE 1.4 (H) 03/02/2022    BUN 31 (H) 03/02/2022     03/02/2022    K 5.0 03/02/2022     03/02/2022    CO2 28 03/02/2022     Lab Results   Component Value Date     (H) 04/15/2012     Lab Results   Component Value Date/Time    WBC 6.5 03/02/2022 12:00 PM    RBC 4.61 03/02/2022 12:00 PM    HGB 13.3 03/02/2022 12:00 PM    HCT 43.7 03/02/2022 12:00 PM    MCV 94.8 03/02/2022 12:00 PM    MCH 28.9 03/02/2022 12:00 PM    MCHC 30.4 03/02/2022 12:00 PM    RDW 14.7 03/02/2022 12:00 PM     03/02/2022 12:00 PM    MPV 12.2 03/02/2022 12:00 PM     Lab Results   Component Value Date/Time    MG 2.0 09/05/2019 04:35 AM     Lab Results   Component Value Date/Time    PROTIME 11.1 03/07/2016 01:23 PM    PROTIME 11.1 04/15/2012 01:26 AM    INR 1.0 03/07/2016 01:23 PM     Lab Results   Component Value Date/Time    TSH 2.720 03/02/2022 12:00 PM     Lab Results   Component Value Date    TRIG 83 03/02/2022    TRIG 97 05/25/2021    TRIG 119 09/09/2020     Lab Results   Component Value Date    HDL 67 03/02/2022    HDL 55 05/25/2021    HDL 48 09/09/2020     Lab Results   Component Value Date    LDLCALC 59 03/02/2022    LDLCALC 55 05/25/2021    LDLCALC 54 09/09/2020     Lab Results   Component Value Date/Time    MG 2.0 09/05/2019 04:35 AM     Lab Results   Component Value Date    CKTOTAL 41 03/08/2016    CKMB 1.5 09/05/2019    TROPONINI <0.01 09/05/2019     Cardiac Tests:  ECG: SB, rate 42, 1st degree AV block, NSSTT changes    Telemetry (9/2019): SB, rate 50's    February 2015 echocardiogram: EF 60%, normal RV function, stage 2 DD, mild valve disease    Echocardiogram: 3/8/16 (read by Dr. Carlos Prince)  Normal left ventricle size and systolic function  Ejection fraction is visually estimated at 65%. Mild left ventricular concentric hypertrophy noted. The left atrium is mildly dilated. Mild mitral regurgitation is present. Mild tricuspid regurgitation. Kendra Gula nuclear stress test: 3/8/16  1. No evidence of pharmacologic stress-induced myocardial   ischemia. 2. No focal wall motion abnormalities are demonstrated. 3. The left ventricular ejection fraction is 68%. Kendra Gula nuclear stress test: 1/9/18  1. No reversible perfusion defect   2. Ejection fraction is 62 %. 3. No significant wall motion abnormality     Impression/Plan:  1. Chest pain -- currently CP free, negative troponin x 2 in 9/2019, negative Lexiscan nuclear stress test on 1/9/18  2. CAD/MI s/p CABG in 2007 (LIMA-LAD and SVG-LCx. Negative stress test ~ 10 years ago per patient and negative Lexiscan nuclear stress test on 3/8/16 and 1/9/18. 3. HTN -- BP elevated today (BP at prior office visits 104/64, 124/60, 120/60); on multiple antihypertensive agents  4. HLD -- on statin  5. Diastolic dysfunction   6. Hypothyroidism -- normal TSH in 9/2017, 7/2019, 5/2021, 3/2022  7. CKD -- most recent Cr 1.4  8.  History of sinus bradycardia -- coreg dose decreased to 3.125 mg BID in 11/2016 and then subsequently discontinued, previously wore cardiac monitor, HR 42 today HR 43 at lat office visit (\"I feel fine\")    - Results of prior cardiac testing outlined above / discussed options for further CAD work-up (including cardiac catheterization) pending clinical course -- decision made to continue with close monitoring (no recent cardiac complaints)  - Continue current medications (coreg previously discontinued) / previously discussed option of adding imdur (she deferred unless chest pain occured more frequently)  - Discussed option of EP evaluation (she denies recent dizziness, lightheadedness, or presyncope/syncope)  - Continue home BP monitoring (home BP log reviewed today -- SBP typically controlled, HR 40's-70's)  - Monitor labs    Greater than 30 minutes was spent counseling the patient, reviewing the rationale for the above recommendations and reviewing the patient's current medication list, problem list and results of all previously ordered testing.      Sammy Cool MD  [facility]) Cardiology

## 2022-12-21 ENCOUNTER — NURSE ONLY (OUTPATIENT)
Dept: FAMILY MEDICINE CLINIC | Age: 87
End: 2022-12-21

## 2022-12-21 VITALS — SYSTOLIC BLOOD PRESSURE: 139 MMHG | HEART RATE: 51 BPM | DIASTOLIC BLOOD PRESSURE: 68 MMHG

## 2022-12-21 NOTE — PROGRESS NOTES
Patient here for Blood Pressure Check  Vitals:    12/21/22 1139   BP: 139/68   Pulse: 51       If CHANGES are needed please open this encounter, place orders or make changes as needed and route back to the clinical pool.

## 2023-02-01 DIAGNOSIS — Z79.899 MEDICATION MANAGEMENT: ICD-10-CM

## 2023-02-01 DIAGNOSIS — M54.17 LUMBOSACRAL RADICULOPATHY AT S1: ICD-10-CM

## 2023-02-02 RX ORDER — GABAPENTIN 100 MG/1
CAPSULE ORAL
Qty: 180 CAPSULE | Refills: 0 | Status: SHIPPED | OUTPATIENT
Start: 2023-02-02 | End: 2023-05-03

## 2023-02-22 ENCOUNTER — OFFICE VISIT (OUTPATIENT)
Dept: FAMILY MEDICINE CLINIC | Age: 88
End: 2023-02-22

## 2023-02-22 VITALS
WEIGHT: 185 LBS | OXYGEN SATURATION: 98 % | DIASTOLIC BLOOD PRESSURE: 79 MMHG | TEMPERATURE: 97.5 F | BODY MASS INDEX: 32.78 KG/M2 | HEIGHT: 63 IN | SYSTOLIC BLOOD PRESSURE: 126 MMHG | RESPIRATION RATE: 16 BRPM | HEART RATE: 67 BPM

## 2023-02-22 DIAGNOSIS — J06.9 VIRAL URI: Primary | ICD-10-CM

## 2023-02-22 RX ORDER — AMOXICILLIN AND CLAVULANATE POTASSIUM 875; 125 MG/1; MG/1
1 TABLET, FILM COATED ORAL 2 TIMES DAILY
Qty: 14 TABLET | Refills: 0 | Status: SHIPPED | OUTPATIENT
Start: 2023-02-22 | End: 2023-03-01

## 2023-02-22 RX ORDER — IPRATROPIUM BROMIDE 42 UG/1
2 SPRAY, METERED NASAL 4 TIMES DAILY
Qty: 15 ML | Refills: 3 | Status: SHIPPED | OUTPATIENT
Start: 2023-02-22

## 2023-02-22 SDOH — ECONOMIC STABILITY: INCOME INSECURITY: HOW HARD IS IT FOR YOU TO PAY FOR THE VERY BASICS LIKE FOOD, HOUSING, MEDICAL CARE, AND HEATING?: NOT HARD AT ALL

## 2023-02-22 SDOH — ECONOMIC STABILITY: HOUSING INSECURITY
IN THE LAST 12 MONTHS, WAS THERE A TIME WHEN YOU DID NOT HAVE A STEADY PLACE TO SLEEP OR SLEPT IN A SHELTER (INCLUDING NOW)?: NO

## 2023-02-22 SDOH — ECONOMIC STABILITY: FOOD INSECURITY: WITHIN THE PAST 12 MONTHS, THE FOOD YOU BOUGHT JUST DIDN'T LAST AND YOU DIDN'T HAVE MONEY TO GET MORE.: NEVER TRUE

## 2023-02-22 SDOH — ECONOMIC STABILITY: FOOD INSECURITY: WITHIN THE PAST 12 MONTHS, YOU WORRIED THAT YOUR FOOD WOULD RUN OUT BEFORE YOU GOT MONEY TO BUY MORE.: NEVER TRUE

## 2023-02-22 ASSESSMENT — PATIENT HEALTH QUESTIONNAIRE - PHQ9
2. FEELING DOWN, DEPRESSED OR HOPELESS: 0
1. LITTLE INTEREST OR PLEASURE IN DOING THINGS: 0
SUM OF ALL RESPONSES TO PHQ QUESTIONS 1-9: 0
SUM OF ALL RESPONSES TO PHQ9 QUESTIONS 1 & 2: 0
SUM OF ALL RESPONSES TO PHQ QUESTIONS 1-9: 0

## 2023-02-22 NOTE — PROGRESS NOTES
Jeni Staples is a 82y.o. year old female Established patient, here for evaluation of the following chief complaint(s):    Chief Complaint   Patient presents with    Congestion     Day 3, mild headache    Cough         Neli Flores was seen today for congestion and cough. Diagnoses and all orders for this visit:    Viral URI  -     ipratropium (ATROVENT) 0.06 % nasal spray; 2 sprays by Each Nostril route 4 times daily    Other orders  -     amoxicillin-clavulanate (AUGMENTIN) 875-125 MG per tablet; Take 1 tablet by mouth 2 times daily for 7 days    Plan  The likely viral nature of the acute illness was discussed. Antibiotics are not clinically indicated and the patient was educated on this. However Rx for Augmentin was given in the event of failure to improve over the next 3-4 days. At that point the chance of bacterial sinusitis will increase and Abx are warranted. Symptomatic control discussed. Advised on signs and symptoms that warrant a more immediate evaluation was reviewed. Patient/guardian was given the opportunity to ask questions regarding the visit today. Questions were addressed. They verbalized comfort and understanding of the assessment and plan. Return for keep next appt as scheduled or see sooner if neded. SUBJECTIVE/OBJECTIVE:    HPI    URI  Acute onset of cough, sinus pressure, congestion, rhinorrhea. Cough is non productive  Rhinorrhea is clear. No fevers  Voice is hoarse. Throat feels sore. Has sinus pressure. Took nothing OTC or left over Rx   +ill contacts at home. Review of Systems   Constitutional:  Negative for appetite change, chills, diaphoresis and fever. Respiratory:  Negative for chest tightness, shortness of breath and wheezing. Cardiovascular:  Negative for chest pain and leg swelling (no change from baseline. ). Genitourinary:  Negative for difficulty urinating.           Objective   /79   Pulse 67   Temp 97.5 °F (36.4 °C) (Temporal)   Resp 16   Ht 5' 3\" (1.6 m)   Wt 185 lb (83.9 kg)   LMP  (LMP Unknown)   SpO2 98%   BMI 32.77 kg/m²     Physical Exam  Vitals reviewed. Constitutional:       General: She is not in acute distress. Appearance: Normal appearance. HENT:      Head: Normocephalic. Right Ear: Tympanic membrane normal.      Left Ear: Tympanic membrane normal.      Nose: Congestion and rhinorrhea present. Mouth/Throat:      Mouth: Mucous membranes are moist.      Pharynx: Oropharynx is clear. Posterior oropharyngeal erythema present. No oropharyngeal exudate. Eyes:      Conjunctiva/sclera: Conjunctivae normal.   Cardiovascular:      Rate and Rhythm: Normal rate and regular rhythm. Pulses: Normal pulses. Heart sounds: Murmur heard. Pulmonary:      Breath sounds: Normal breath sounds. No stridor. No wheezing, rhonchi or rales. Abdominal:      General: Bowel sounds are normal. There is no distension. Palpations: Abdomen is soft. Tenderness: There is no abdominal tenderness. Musculoskeletal:         General: Swelling (tr-1+ BLE edema) present. Lymphadenopathy:      Cervical: No cervical adenopathy. Skin:     General: Skin is warm and dry. Neurological:      General: No focal deficit present. Mental Status: She is alert. An electronic signature was used to authenticate this note.     --Gab Russell MD

## 2023-02-24 ENCOUNTER — TELEPHONE (OUTPATIENT)
Dept: FAMILY MEDICINE CLINIC | Age: 88
End: 2023-02-24

## 2023-02-24 ASSESSMENT — ENCOUNTER SYMPTOMS
WHEEZING: 0
CHEST TIGHTNESS: 0
SHORTNESS OF BREATH: 0

## 2023-02-24 NOTE — TELEPHONE ENCOUNTER
Patient was prescribed Paxlovid for covid but still has questions about whether it is safe for her to take. She would like to speak to Yamila Greenwood before taking.

## 2023-03-09 ENCOUNTER — OFFICE VISIT (OUTPATIENT)
Dept: FAMILY MEDICINE CLINIC | Age: 88
End: 2023-03-09

## 2023-03-09 VITALS
RESPIRATION RATE: 16 BRPM | WEIGHT: 189 LBS | DIASTOLIC BLOOD PRESSURE: 66 MMHG | SYSTOLIC BLOOD PRESSURE: 130 MMHG | BODY MASS INDEX: 33.49 KG/M2 | OXYGEN SATURATION: 98 % | HEIGHT: 63 IN | HEART RATE: 47 BPM | TEMPERATURE: 97.4 F

## 2023-03-09 DIAGNOSIS — E78.2 MIXED HYPERLIPIDEMIA: ICD-10-CM

## 2023-03-09 DIAGNOSIS — U07.1 COVID-19: ICD-10-CM

## 2023-03-09 DIAGNOSIS — N25.81 SECONDARY HYPERPARATHYROIDISM OF RENAL ORIGIN (HCC): ICD-10-CM

## 2023-03-09 DIAGNOSIS — I25.10 CORONARY ARTERY DISEASE INVOLVING NATIVE CORONARY ARTERY OF NATIVE HEART WITHOUT ANGINA PECTORIS: ICD-10-CM

## 2023-03-09 DIAGNOSIS — I77.819 AORTIC ECTASIA (HCC): ICD-10-CM

## 2023-03-09 DIAGNOSIS — H35.30 SENILE MACULAR DEGENERATION: ICD-10-CM

## 2023-03-09 DIAGNOSIS — I25.10 CORONARY ARTERY DISEASE INVOLVING NATIVE CORONARY ARTERY OF NATIVE HEART WITHOUT ANGINA PECTORIS: Primary | ICD-10-CM

## 2023-03-09 DIAGNOSIS — I10 ESSENTIAL HYPERTENSION: ICD-10-CM

## 2023-03-09 DIAGNOSIS — N18.30 STAGE 3 CHRONIC KIDNEY DISEASE, UNSPECIFIED WHETHER STAGE 3A OR 3B CKD (HCC): ICD-10-CM

## 2023-03-09 DIAGNOSIS — M17.0 OSTEOARTHRITIS OF BOTH KNEES, UNSPECIFIED OSTEOARTHRITIS TYPE: ICD-10-CM

## 2023-03-09 DIAGNOSIS — Z23 NEED FOR DIPHTHERIA-TETANUS-PERTUSSIS (TDAP) VACCINE: ICD-10-CM

## 2023-03-09 LAB
BASOPHILS ABSOLUTE: 0.05 E9/L (ref 0–0.2)
BASOPHILS RELATIVE PERCENT: 0.8 % (ref 0–2)
EOSINOPHILS ABSOLUTE: 0.18 E9/L (ref 0.05–0.5)
EOSINOPHILS RELATIVE PERCENT: 2.8 % (ref 0–6)
HCT VFR BLD CALC: 45.7 % (ref 34–48)
HEMOGLOBIN: 13.9 G/DL (ref 11.5–15.5)
IMMATURE GRANULOCYTES #: 0.02 E9/L
IMMATURE GRANULOCYTES %: 0.3 % (ref 0–5)
LYMPHOCYTES ABSOLUTE: 1.89 E9/L (ref 1.5–4)
LYMPHOCYTES RELATIVE PERCENT: 29.3 % (ref 20–42)
MCH RBC QN AUTO: 28.8 PG (ref 26–35)
MCHC RBC AUTO-ENTMCNC: 30.4 % (ref 32–34.5)
MCV RBC AUTO: 94.6 FL (ref 80–99.9)
MONOCYTES ABSOLUTE: 0.54 E9/L (ref 0.1–0.95)
MONOCYTES RELATIVE PERCENT: 8.4 % (ref 2–12)
NEUTROPHILS ABSOLUTE: 3.78 E9/L (ref 1.8–7.3)
NEUTROPHILS RELATIVE PERCENT: 58.4 % (ref 43–80)
PDW BLD-RTO: 14.6 FL (ref 11.5–15)
PLATELET # BLD: 236 E9/L (ref 130–450)
PMV BLD AUTO: 12 FL (ref 7–12)
RBC # BLD: 4.83 E12/L (ref 3.5–5.5)
WBC # BLD: 6.5 E9/L (ref 4.5–11.5)

## 2023-03-09 ASSESSMENT — ENCOUNTER SYMPTOMS
WHEEZING: 0
ABDOMINAL PAIN: 0
COUGH: 0
TROUBLE SWALLOWING: 0
SHORTNESS OF BREATH: 0

## 2023-03-09 NOTE — PROGRESS NOTES
Nadege Leblanc is a 82y.o. year old female Established patient, here for evaluation of the following chief complaint(s):    Chief Complaint   Patient presents with    Hypertension       Shelli Pal was seen today for hypertension. Diagnoses and all orders for this visit:    Coronary artery disease involving native coronary artery of native heart without angina pectoris  -     CBC with Auto Differential; Future  -     Comprehensive Metabolic Panel; Future  -     Lipid Panel; Future    Aortic ectasia (HCC)    Stage 3 chronic kidney disease, unspecified whether stage 3a or 3b CKD ([address]    Secondary hyperparathyroidism of renal origin ([address]    Need for diphtheria-tetanus-pertussis (Tdap) vaccine  -     Tdap (ADACEL) 5-2-15.5 LF-MCG/0.5 injection; Inject 0.5 mLs into the muscle once for 1 dose    Mixed hyperlipidemia    Essential hypertension    Senile macular degeneration    Osteoarthritis of both knees, unspecified osteoarthritis type    COVID-19      Plan  Has been seen by cardiology. Her chronic cardiac issues have been felt to be stable. Blood pressure is at goal.  She is going to get a new cuff for home. Checking status of routine labs as above. CKD and subsequent secondary hyperparathyroidism are unremarkable/stable. Scheduled to see her nephrologist in the next month. She will have additional labs for them    Macular degeneration as per her ophthalmologist/retinal specialist    OA of both knees is stable    Clinically improving with respect to her COVID. Patient/guardian was given the opportunity to ask questions regarding the visit today. Questions were addressed. They verbalized comfort and understanding of the assessment and plan. Return in about 4 months (around 7/9/2023) for chronic issues . SUBJECTIVE/OBJECTIVE:    HPI    Vision problems  Does have macular degeneration- likely issues are related to this. Continues to have visual field disruption. Still driving.     Sees ophthalmology regularly     COVID-19  Seen a week ago for viral illness, later tested positive for COVID after previously testing negative  Tolerating Paxlovid doing well no complaints  No fever sweats or chills  Congestion and cough have improved      Cardiac issues-CAD, hypertension, aortic ectasia, hypertension, hyperlipidemia, diastolic dysfunction  She reports doing relatively well  States she is compliant with her medications  Saw cardiology in the fall and was given a 1 year follow-up  Denies chest pains  Blood pressure logs are again provided. Blood pressures seem reasonably well controlled. Logs provided are incomplete- delfino broke no readings over past month or so. States bradycardia is still present, but no further tachycardias  Not dizzy. No syncope or near syncope  Lower extremity swelling is stable- still with L>R. Thinks summer time is worse for her swelling. Aortic ectasia checked by echo, stable     CKD and secondary hyperparathyroidism with hypercalcemia  Follow-up  Sees nephrology- no new notes; sees them later this sring   No concerns raised otherwise     Orthopedic issues  Chronic arthralgias of the knees, chronic back pain  Issues are chronic, unchanged - no new concerns  Not ready to consider orthopedic interventions    Review of Systems   Constitutional:  Positive for fatigue. Negative for chills, fever and unexpected weight change. HENT:  Negative for trouble swallowing. Respiratory:  Negative for cough, shortness of breath and wheezing. Cardiovascular:  Positive for leg swelling. Negative for chest pain and palpitations. Gastrointestinal:  Negative for abdominal pain. Genitourinary:  Negative for difficulty urinating. Objective   /66   Pulse (!) 47   Temp 97.4 °F (36.3 °C) (Temporal)   Resp 16   Ht 5' 3\" (1.6 m)   Wt 189 lb (85.7 kg)   LMP  (LMP Unknown)   SpO2 98%   BMI 33.48 kg/m²     Physical Exam  Vitals reviewed.    Constitutional: Appearance: Normal appearance. She is not toxic-appearing. HENT:      Head: Normocephalic and atraumatic. Nose: Nose normal. No congestion. Mouth/Throat:      Mouth: Mucous membranes are moist.      Pharynx: Oropharynx is clear. Eyes:      Conjunctiva/sclera: Conjunctivae normal.   Neck:      Vascular: No carotid bruit. Cardiovascular:      Rate and Rhythm: Normal rate and regular rhythm. Pulses: Normal pulses. Heart sounds: No murmur heard. Pulmonary:      Breath sounds: Normal breath sounds. No wheezing, rhonchi or rales. Abdominal:      General: Bowel sounds are normal. There is no distension. Tenderness: There is no abdominal tenderness. Musculoskeletal:      Cervical back: Neck supple. Right lower leg: Edema (tr) present. Left lower leg: Edema (tr) present. Lymphadenopathy:      Cervical: No cervical adenopathy. Skin:     General: Skin is warm and dry. Findings: No rash. Neurological:      General: No focal deficit present. Mental Status: She is alert. Mental status is at baseline. Psychiatric:         Mood and Affect: Mood normal.         Behavior: Behavior normal.            An electronic signature was used to authenticate this note.     --Tyshawn Villar MD

## 2023-03-10 LAB
ALBUMIN SERPL-MCNC: 3.7 G/DL (ref 3.5–5.2)
ALP BLD-CCNC: 128 U/L (ref 35–104)
ALT SERPL-CCNC: 27 U/L (ref 0–32)
ANION GAP SERPL CALCULATED.3IONS-SCNC: 10 MMOL/L (ref 7–16)
AST SERPL-CCNC: 24 U/L (ref 0–31)
BILIRUB SERPL-MCNC: 0.4 MG/DL (ref 0–1.2)
BUN BLDV-MCNC: 23 MG/DL (ref 6–23)
CALCIUM SERPL-MCNC: 9.4 MG/DL (ref 8.6–10.2)
CHLORIDE BLD-SCNC: 108 MMOL/L (ref 98–107)
CHOLESTEROL, TOTAL: 152 MG/DL (ref 0–199)
CO2: 26 MMOL/L (ref 22–29)
CREAT SERPL-MCNC: 1.1 MG/DL (ref 0.5–1)
GFR SERPL CREATININE-BSD FRML MDRD: 49 ML/MIN/1.73
GLUCOSE BLD-MCNC: 109 MG/DL (ref 74–99)
HDLC SERPL-MCNC: 54 MG/DL
LDL CHOLESTEROL CALCULATED: 76 MG/DL (ref 0–99)
POTASSIUM SERPL-SCNC: 4.9 MMOL/L (ref 3.5–5)
SODIUM BLD-SCNC: 144 MMOL/L (ref 132–146)
TOTAL PROTEIN: 7 G/DL (ref 6.4–8.3)
TRIGL SERPL-MCNC: 108 MG/DL (ref 0–149)
VLDLC SERPL CALC-MCNC: 22 MG/DL

## 2023-04-25 DIAGNOSIS — M54.17 LUMBOSACRAL RADICULOPATHY AT S1: ICD-10-CM

## 2023-04-25 DIAGNOSIS — Z79.899 MEDICATION MANAGEMENT: ICD-10-CM

## 2023-04-26 RX ORDER — GABAPENTIN 100 MG/1
CAPSULE ORAL
Qty: 180 CAPSULE | Refills: 3 | OUTPATIENT
Start: 2023-04-26 | End: 2023-07-25

## 2023-04-27 ENCOUNTER — PATIENT MESSAGE (OUTPATIENT)
Dept: FAMILY MEDICINE CLINIC | Age: 88
End: 2023-04-27

## 2023-04-27 DIAGNOSIS — M54.17 LUMBOSACRAL RADICULOPATHY AT S1: ICD-10-CM

## 2023-04-27 DIAGNOSIS — Z79.899 MEDICATION MANAGEMENT: ICD-10-CM

## 2023-04-27 RX ORDER — GABAPENTIN 100 MG/1
100 CAPSULE ORAL 2 TIMES DAILY
Qty: 180 CAPSULE | Refills: 0 | OUTPATIENT
Start: 2023-04-27 | End: 2023-07-26

## 2023-04-27 NOTE — TELEPHONE ENCOUNTER
From: Bertha Dorsey  To: Dr. Maria Teresa Lopez: 4/27/2023 12:11 PM EDT  Subject: Gabapentin refill    [facility] is requesting a refill prescription.    Thank you,  John Verma

## 2023-05-05 DIAGNOSIS — Z79.899 MEDICATION MANAGEMENT: ICD-10-CM

## 2023-05-05 DIAGNOSIS — R13.14 PHARYNGOESOPHAGEAL DYSPHAGIA: ICD-10-CM

## 2023-05-05 DIAGNOSIS — E03.9 ACQUIRED HYPOTHYROIDISM: ICD-10-CM

## 2023-05-05 DIAGNOSIS — E78.2 MIXED HYPERLIPIDEMIA: ICD-10-CM

## 2023-05-05 DIAGNOSIS — I10 ESSENTIAL HYPERTENSION: ICD-10-CM

## 2023-05-08 RX ORDER — ATORVASTATIN CALCIUM 20 MG/1
20 TABLET, FILM COATED ORAL DAILY
Qty: 90 TABLET | Refills: 3 | Status: ON HOLD | OUTPATIENT
Start: 2023-05-08

## 2023-05-08 RX ORDER — ENALAPRIL MALEATE 20 MG/1
20 TABLET ORAL 2 TIMES DAILY
Qty: 180 TABLET | Refills: 3 | Status: ON HOLD | OUTPATIENT
Start: 2023-05-08

## 2023-05-08 RX ORDER — NIFEDIPINE 30 MG/1
30 TABLET, EXTENDED RELEASE ORAL DAILY
Qty: 90 TABLET | Refills: 3 | Status: ON HOLD | OUTPATIENT
Start: 2023-05-08

## 2023-05-08 RX ORDER — CLONIDINE HYDROCHLORIDE 0.1 MG/1
0.1 TABLET ORAL 2 TIMES DAILY
Qty: 180 TABLET | Refills: 3 | Status: ON HOLD | OUTPATIENT
Start: 2023-05-08

## 2023-05-08 RX ORDER — LEVOTHYROXINE SODIUM 0.05 MG/1
50 TABLET ORAL DAILY
Qty: 90 TABLET | Refills: 3 | Status: ON HOLD | OUTPATIENT
Start: 2023-05-08

## 2023-05-08 RX ORDER — FUROSEMIDE 20 MG/1
20 TABLET ORAL DAILY
Qty: 90 TABLET | Refills: 3 | Status: ON HOLD | OUTPATIENT
Start: 2023-05-08

## 2023-05-08 RX ORDER — FAMOTIDINE 20 MG/1
20 TABLET, FILM COATED ORAL 2 TIMES DAILY
Qty: 180 TABLET | Refills: 3 | Status: ON HOLD | OUTPATIENT
Start: 2023-05-08

## 2023-07-08 ENCOUNTER — HOSPITAL ENCOUNTER (INPATIENT)
Age: 88
LOS: 2 days | Discharge: HOME | DRG: 313 | End: 2023-07-10
Attending: STUDENT IN AN ORGANIZED HEALTH CARE EDUCATION/TRAINING PROGRAM
Payer: MEDICARE

## 2023-07-08 ENCOUNTER — APPOINTMENT (OUTPATIENT)
Dept: GENERAL RADIOLOGY | Age: 88
DRG: 313 | End: 2023-07-08
Payer: MEDICARE

## 2023-07-08 DIAGNOSIS — R07.9 CHEST PAIN, UNSPECIFIED TYPE: Primary | ICD-10-CM

## 2023-07-08 LAB
ANION GAP SERPL CALCULATED.3IONS-SCNC: 7 MMOL/L (ref 7–16)
BASOPHILS # BLD: 0.03 E9/L (ref 0–0.2)
BASOPHILS NFR BLD: 0.5 % (ref 0–2)
BNP BLD-MCNC: 1144 PG/ML (ref 0–450)
BUN SERPL-MCNC: 19 MG/DL (ref 6–23)
CALCIUM SERPL-MCNC: 9.4 MG/DL (ref 8.6–10.2)
CHLORIDE SERPL-SCNC: 103 MMOL/L (ref 98–107)
CO2 SERPL-SCNC: 30 MMOL/L (ref 22–29)
CREAT SERPL-MCNC: 1.1 MG/DL (ref 0.5–1)
EKG ATRIAL RATE: 49 BPM
EKG P AXIS: 85 DEGREES
EKG P-R INTERVAL: 202 MS
EKG Q-T INTERVAL: 454 MS
EKG QRS DURATION: 86 MS
EKG QTC CALCULATION (BAZETT): 410 MS
EKG R AXIS: 71 DEGREES
EKG T AXIS: 75 DEGREES
EKG VENTRICULAR RATE: 49 BPM
EOSINOPHIL # BLD: 0.13 E9/L (ref 0.05–0.5)
EOSINOPHIL NFR BLD: 2.2 % (ref 0–6)
ERYTHROCYTE [DISTWIDTH] IN BLOOD BY AUTOMATED COUNT: 13.9 FL (ref 11.5–15)
GLUCOSE SERPL-MCNC: 105 MG/DL (ref 74–99)
HCT VFR BLD AUTO: 45 % (ref 34–48)
HGB BLD-MCNC: 14.7 G/DL (ref 11.5–15.5)
IMM GRANULOCYTES # BLD: 0.03 E9/L
IMM GRANULOCYTES NFR BLD: 0.5 % (ref 0–5)
LYMPHOCYTES # BLD: 1.63 E9/L (ref 1.5–4)
LYMPHOCYTES NFR BLD: 27.9 % (ref 20–42)
MAGNESIUM SERPL-MCNC: 2.2 MG/DL (ref 1.6–2.6)
MCH RBC QN AUTO: 29.7 PG (ref 26–35)
MCHC RBC AUTO-ENTMCNC: 32.7 % (ref 32–34.5)
MCV RBC AUTO: 90.9 FL (ref 80–99.9)
MONOCYTES # BLD: 0.32 E9/L (ref 0.1–0.95)
MONOCYTES NFR BLD: 5.5 % (ref 2–12)
NEUTROPHILS # BLD: 3.7 E9/L (ref 1.8–7.3)
NEUTS SEG NFR BLD: 63.4 % (ref 43–80)
PLATELET # BLD AUTO: 209 E9/L (ref 130–450)
PMV BLD AUTO: 11.3 FL (ref 7–12)
POTASSIUM SERPL-SCNC: 3.9 MMOL/L (ref 3.5–5)
RBC # BLD AUTO: 4.95 E12/L (ref 3.5–5.5)
SODIUM SERPL-SCNC: 140 MMOL/L (ref 132–146)
TROPONIN, HIGH SENSITIVITY: 16 NG/L (ref 0–9)
TROPONIN, HIGH SENSITIVITY: 18 NG/L (ref 0–9)
WBC # BLD: 5.8 E9/L (ref 4.5–11.5)

## 2023-07-08 PROCEDURE — 6370000000 HC RX 637 (ALT 250 FOR IP): Performed by: FAMILY MEDICINE

## 2023-07-08 PROCEDURE — 6370000000 HC RX 637 (ALT 250 FOR IP)

## 2023-07-08 PROCEDURE — 99285 EMERGENCY DEPT VISIT HI MDM: CPT

## 2023-07-08 PROCEDURE — 83880 ASSAY OF NATRIURETIC PEPTIDE: CPT

## 2023-07-08 PROCEDURE — 93005 ELECTROCARDIOGRAM TRACING: CPT | Performed by: STUDENT IN AN ORGANIZED HEALTH CARE EDUCATION/TRAINING PROGRAM

## 2023-07-08 PROCEDURE — 84484 ASSAY OF TROPONIN QUANT: CPT

## 2023-07-08 PROCEDURE — 83735 ASSAY OF MAGNESIUM: CPT

## 2023-07-08 PROCEDURE — 6360000002 HC RX W HCPCS

## 2023-07-08 PROCEDURE — 93010 ELECTROCARDIOGRAM REPORT: CPT | Performed by: INTERNAL MEDICINE

## 2023-07-08 PROCEDURE — 2580000003 HC RX 258

## 2023-07-08 PROCEDURE — 71046 X-RAY EXAM CHEST 2 VIEWS: CPT

## 2023-07-08 PROCEDURE — 80048 BASIC METABOLIC PNL TOTAL CA: CPT

## 2023-07-08 PROCEDURE — 85025 COMPLETE CBC W/AUTO DIFF WBC: CPT

## 2023-07-08 PROCEDURE — 2060000000 HC ICU INTERMEDIATE R&B

## 2023-07-08 PROCEDURE — 36415 COLL VENOUS BLD VENIPUNCTURE: CPT

## 2023-07-08 RX ORDER — CLONIDINE HYDROCHLORIDE 0.1 MG/1
0.1 TABLET ORAL 2 TIMES DAILY
Status: DISCONTINUED | OUTPATIENT
Start: 2023-07-08 | End: 2023-07-10 | Stop reason: HOSPADM

## 2023-07-08 RX ORDER — NIFEDIPINE 30 MG/1
30 TABLET, FILM COATED, EXTENDED RELEASE ORAL DAILY
Status: DISCONTINUED | OUTPATIENT
Start: 2023-07-09 | End: 2023-07-10 | Stop reason: HOSPADM

## 2023-07-08 RX ORDER — BUSPIRONE HYDROCHLORIDE 5 MG/1
5 TABLET ORAL DAILY PRN
Status: DISCONTINUED | OUTPATIENT
Start: 2023-07-08 | End: 2023-07-10

## 2023-07-08 RX ORDER — POLYETHYLENE GLYCOL 3350 17 G/17G
17 POWDER, FOR SOLUTION ORAL DAILY PRN
Status: DISCONTINUED | OUTPATIENT
Start: 2023-07-08 | End: 2023-07-10 | Stop reason: HOSPADM

## 2023-07-08 RX ORDER — ASPIRIN 81 MG/1
324 TABLET, CHEWABLE ORAL ONCE
Status: COMPLETED | OUTPATIENT
Start: 2023-07-08 | End: 2023-07-08

## 2023-07-08 RX ORDER — ENOXAPARIN SODIUM 100 MG/ML
40 INJECTION SUBCUTANEOUS DAILY
Status: DISCONTINUED | OUTPATIENT
Start: 2023-07-08 | End: 2023-07-10 | Stop reason: HOSPADM

## 2023-07-08 RX ORDER — ONDANSETRON 4 MG/1
4 TABLET, ORALLY DISINTEGRATING ORAL EVERY 8 HOURS PRN
Status: DISCONTINUED | OUTPATIENT
Start: 2023-07-08 | End: 2023-07-10 | Stop reason: HOSPADM

## 2023-07-08 RX ORDER — ONDANSETRON 2 MG/ML
4 INJECTION INTRAMUSCULAR; INTRAVENOUS EVERY 6 HOURS PRN
Status: DISCONTINUED | OUTPATIENT
Start: 2023-07-08 | End: 2023-07-10 | Stop reason: HOSPADM

## 2023-07-08 RX ORDER — SODIUM CHLORIDE 9 MG/ML
INJECTION, SOLUTION INTRAVENOUS PRN
Status: DISCONTINUED | OUTPATIENT
Start: 2023-07-08 | End: 2023-07-10 | Stop reason: HOSPADM

## 2023-07-08 RX ORDER — SODIUM CHLORIDE 0.9 % (FLUSH) 0.9 %
5-40 SYRINGE (ML) INJECTION EVERY 12 HOURS SCHEDULED
Status: DISCONTINUED | OUTPATIENT
Start: 2023-07-08 | End: 2023-07-10 | Stop reason: HOSPADM

## 2023-07-08 RX ORDER — FUROSEMIDE 20 MG/1
20 TABLET ORAL DAILY
Status: DISCONTINUED | OUTPATIENT
Start: 2023-07-09 | End: 2023-07-10 | Stop reason: HOSPADM

## 2023-07-08 RX ORDER — ASPIRIN 81 MG/1
324 TABLET, CHEWABLE ORAL DAILY
Status: DISCONTINUED | OUTPATIENT
Start: 2023-07-09 | End: 2023-07-09

## 2023-07-08 RX ORDER — GABAPENTIN 100 MG/1
100 CAPSULE ORAL 2 TIMES DAILY
COMMUNITY

## 2023-07-08 RX ORDER — FAMOTIDINE 20 MG/1
20 TABLET, FILM COATED ORAL 2 TIMES DAILY
Status: DISCONTINUED | OUTPATIENT
Start: 2023-07-08 | End: 2023-07-10

## 2023-07-08 RX ORDER — ATORVASTATIN CALCIUM 40 MG/1
40 TABLET, FILM COATED ORAL DAILY
Status: DISCONTINUED | OUTPATIENT
Start: 2023-07-09 | End: 2023-07-10 | Stop reason: HOSPADM

## 2023-07-08 RX ORDER — GABAPENTIN 100 MG/1
100 CAPSULE ORAL 2 TIMES DAILY
Status: DISCONTINUED | OUTPATIENT
Start: 2023-07-08 | End: 2023-07-08

## 2023-07-08 RX ORDER — SODIUM CHLORIDE 0.9 % (FLUSH) 0.9 %
5-40 SYRINGE (ML) INJECTION PRN
Status: DISCONTINUED | OUTPATIENT
Start: 2023-07-08 | End: 2023-07-10 | Stop reason: HOSPADM

## 2023-07-08 RX ORDER — ENALAPRIL MALEATE 10 MG/1
20 TABLET ORAL 2 TIMES DAILY
Status: DISCONTINUED | OUTPATIENT
Start: 2023-07-08 | End: 2023-07-10 | Stop reason: HOSPADM

## 2023-07-08 RX ORDER — LEVOTHYROXINE SODIUM 0.05 MG/1
50 TABLET ORAL
Status: DISCONTINUED | OUTPATIENT
Start: 2023-07-09 | End: 2023-07-10 | Stop reason: HOSPADM

## 2023-07-08 RX ORDER — GABAPENTIN 100 MG/1
100 CAPSULE ORAL 2 TIMES DAILY
Status: DISCONTINUED | OUTPATIENT
Start: 2023-07-08 | End: 2023-07-10 | Stop reason: HOSPADM

## 2023-07-08 RX ORDER — ACETAMINOPHEN 325 MG/1
650 TABLET ORAL EVERY 6 HOURS PRN
Status: DISCONTINUED | OUTPATIENT
Start: 2023-07-08 | End: 2023-07-10 | Stop reason: HOSPADM

## 2023-07-08 RX ORDER — ACETAMINOPHEN 650 MG/1
650 SUPPOSITORY RECTAL EVERY 6 HOURS PRN
Status: DISCONTINUED | OUTPATIENT
Start: 2023-07-08 | End: 2023-07-10 | Stop reason: HOSPADM

## 2023-07-08 RX ADMIN — ASPIRIN 81 MG CHEWABLE TABLET 324 MG: 81 TABLET CHEWABLE at 12:27

## 2023-07-08 RX ADMIN — ENOXAPARIN SODIUM 40 MG: 100 INJECTION SUBCUTANEOUS at 20:48

## 2023-07-08 RX ADMIN — ENALAPRIL MALEATE 20 MG: 10 TABLET ORAL at 20:41

## 2023-07-08 RX ADMIN — GABAPENTIN 100 MG: 100 CAPSULE ORAL at 20:40

## 2023-07-08 RX ADMIN — FAMOTIDINE 20 MG: 20 TABLET, FILM COATED ORAL at 20:40

## 2023-07-08 RX ADMIN — CLONIDINE HYDROCHLORIDE 0.1 MG: 0.1 TABLET ORAL at 20:40

## 2023-07-08 RX ADMIN — SODIUM CHLORIDE, PRESERVATIVE FREE 10 ML: 5 INJECTION INTRAVENOUS at 20:41

## 2023-07-08 ASSESSMENT — LIFESTYLE VARIABLES
HOW OFTEN DO YOU HAVE A DRINK CONTAINING ALCOHOL: NEVER
HOW MANY STANDARD DRINKS CONTAINING ALCOHOL DO YOU HAVE ON A TYPICAL DAY: PATIENT DOES NOT DRINK

## 2023-07-08 ASSESSMENT — PAIN - FUNCTIONAL ASSESSMENT: PAIN_FUNCTIONAL_ASSESSMENT: NONE - DENIES PAIN

## 2023-07-08 ASSESSMENT — ENCOUNTER SYMPTOMS
NAUSEA: 0
SHORTNESS OF BREATH: 0
VOMITING: 0
COUGH: 0
TROUBLE SWALLOWING: 0
ABDOMINAL PAIN: 0
COLOR CHANGE: 0
DIARRHEA: 0
CONSTIPATION: 0
SORE THROAT: 0

## 2023-07-09 ENCOUNTER — APPOINTMENT (OUTPATIENT)
Dept: NUCLEAR MEDICINE | Age: 88
DRG: 313 | End: 2023-07-09
Payer: MEDICARE

## 2023-07-09 LAB
ANION GAP SERPL CALCULATED.3IONS-SCNC: 11 MMOL/L (ref 7–16)
BASOPHILS # BLD: 0.05 E9/L (ref 0–0.2)
BASOPHILS NFR BLD: 0.9 % (ref 0–2)
BUN SERPL-MCNC: 19 MG/DL (ref 6–23)
CALCIUM SERPL-MCNC: 8.9 MG/DL (ref 8.6–10.2)
CHLORIDE SERPL-SCNC: 106 MMOL/L (ref 98–107)
CO2 SERPL-SCNC: 25 MMOL/L (ref 22–29)
CREAT SERPL-MCNC: 1.2 MG/DL (ref 0.5–1)
EOSINOPHIL # BLD: 0.19 E9/L (ref 0.05–0.5)
EOSINOPHIL NFR BLD: 3.5 % (ref 0–6)
ERYTHROCYTE [DISTWIDTH] IN BLOOD BY AUTOMATED COUNT: 13.8 FL (ref 11.5–15)
GLUCOSE SERPL-MCNC: 102 MG/DL (ref 74–99)
HCT VFR BLD AUTO: 38.5 % (ref 34–48)
HGB BLD-MCNC: 12.2 G/DL (ref 11.5–15.5)
IMM GRANULOCYTES # BLD: 0.01 E9/L
IMM GRANULOCYTES NFR BLD: 0.2 % (ref 0–5)
LV EF: 69 %
LVEF MODALITY: NORMAL
LYMPHOCYTES # BLD: 2.02 E9/L (ref 1.5–4)
LYMPHOCYTES NFR BLD: 37.6 % (ref 20–42)
MCH RBC QN AUTO: 29.4 PG (ref 26–35)
MCHC RBC AUTO-ENTMCNC: 31.7 % (ref 32–34.5)
MCV RBC AUTO: 92.8 FL (ref 80–99.9)
METER GLUCOSE: 100 MG/DL (ref 74–99)
MONOCYTES # BLD: 0.47 E9/L (ref 0.1–0.95)
MONOCYTES NFR BLD: 8.8 % (ref 2–12)
NEUTROPHILS # BLD: 2.63 E9/L (ref 1.8–7.3)
NEUTS SEG NFR BLD: 49 % (ref 43–80)
PLATELET # BLD AUTO: 173 E9/L (ref 130–450)
PMV BLD AUTO: 11.5 FL (ref 7–12)
POTASSIUM SERPL-SCNC: 4.1 MMOL/L (ref 3.5–5)
RBC # BLD AUTO: 4.15 E12/L (ref 3.5–5.5)
SODIUM SERPL-SCNC: 142 MMOL/L (ref 132–146)
TSH SERPL-MCNC: 3.94 UIU/ML (ref 0.27–4.2)
WBC # BLD: 5.4 E9/L (ref 4.5–11.5)

## 2023-07-09 PROCEDURE — 78452 HT MUSCLE IMAGE SPECT MULT: CPT

## 2023-07-09 PROCEDURE — 82962 GLUCOSE BLOOD TEST: CPT

## 2023-07-09 PROCEDURE — 6360000002 HC RX W HCPCS

## 2023-07-09 PROCEDURE — 3430000000 HC RX DIAGNOSTIC RADIOPHARMACEUTICAL: Performed by: RADIOLOGY

## 2023-07-09 PROCEDURE — 85025 COMPLETE CBC W/AUTO DIFF WBC: CPT

## 2023-07-09 PROCEDURE — 99222 1ST HOSP IP/OBS MODERATE 55: CPT | Performed by: FAMILY MEDICINE

## 2023-07-09 PROCEDURE — 84443 ASSAY THYROID STIM HORMONE: CPT

## 2023-07-09 PROCEDURE — 99222 1ST HOSP IP/OBS MODERATE 55: CPT | Performed by: INTERNAL MEDICINE

## 2023-07-09 PROCEDURE — 93017 CV STRESS TEST TRACING ONLY: CPT

## 2023-07-09 PROCEDURE — 6370000000 HC RX 637 (ALT 250 FOR IP)

## 2023-07-09 PROCEDURE — 93018 CV STRESS TEST I&R ONLY: CPT | Performed by: INTERNAL MEDICINE

## 2023-07-09 PROCEDURE — 6360000002 HC RX W HCPCS: Performed by: NURSE PRACTITIONER

## 2023-07-09 PROCEDURE — A9500 TC99M SESTAMIBI: HCPCS | Performed by: RADIOLOGY

## 2023-07-09 PROCEDURE — 93016 CV STRESS TEST SUPVJ ONLY: CPT | Performed by: INTERNAL MEDICINE

## 2023-07-09 PROCEDURE — 78452 HT MUSCLE IMAGE SPECT MULT: CPT | Performed by: INTERNAL MEDICINE

## 2023-07-09 PROCEDURE — 2580000003 HC RX 258

## 2023-07-09 PROCEDURE — 36415 COLL VENOUS BLD VENIPUNCTURE: CPT

## 2023-07-09 PROCEDURE — 2060000000 HC ICU INTERMEDIATE R&B

## 2023-07-09 PROCEDURE — 80048 BASIC METABOLIC PNL TOTAL CA: CPT

## 2023-07-09 PROCEDURE — 6370000000 HC RX 637 (ALT 250 FOR IP): Performed by: FAMILY MEDICINE

## 2023-07-09 RX ORDER — TETRAKIS(2-METHOXYISOBUTYLISOCYANIDE)COPPER(I) TETRAFLUOROBORATE 1 MG/ML
30 INJECTION, POWDER, LYOPHILIZED, FOR SOLUTION INTRAVENOUS
Status: COMPLETED | OUTPATIENT
Start: 2023-07-09 | End: 2023-07-09

## 2023-07-09 RX ORDER — TETRAKIS(2-METHOXYISOBUTYLISOCYANIDE)COPPER(I) TETRAFLUOROBORATE 1 MG/ML
10 INJECTION, POWDER, LYOPHILIZED, FOR SOLUTION INTRAVENOUS
Status: COMPLETED | OUTPATIENT
Start: 2023-07-09 | End: 2023-07-09

## 2023-07-09 RX ORDER — REGADENOSON 0.08 MG/ML
0.4 INJECTION, SOLUTION INTRAVENOUS
Status: COMPLETED | OUTPATIENT
Start: 2023-07-09 | End: 2023-07-09

## 2023-07-09 RX ORDER — ISOSORBIDE MONONITRATE 30 MG/1
30 TABLET, EXTENDED RELEASE ORAL DAILY
Status: DISCONTINUED | OUTPATIENT
Start: 2023-07-10 | End: 2023-07-10 | Stop reason: HOSPADM

## 2023-07-09 RX ORDER — ASPIRIN 81 MG/1
81 TABLET, CHEWABLE ORAL DAILY
Status: DISCONTINUED | OUTPATIENT
Start: 2023-07-10 | End: 2023-07-10 | Stop reason: HOSPADM

## 2023-07-09 RX ADMIN — Medication 10 MILLICURIE: at 08:25

## 2023-07-09 RX ADMIN — ASPIRIN 81 MG 324 MG: 81 TABLET ORAL at 14:34

## 2023-07-09 RX ADMIN — ENOXAPARIN SODIUM 40 MG: 100 INJECTION SUBCUTANEOUS at 14:32

## 2023-07-09 RX ADMIN — NIFEDIPINE 30 MG: 30 TABLET, EXTENDED RELEASE ORAL at 14:31

## 2023-07-09 RX ADMIN — FAMOTIDINE 20 MG: 20 TABLET, FILM COATED ORAL at 14:31

## 2023-07-09 RX ADMIN — LEVOTHYROXINE SODIUM 50 MCG: 0.05 TABLET ORAL at 05:25

## 2023-07-09 RX ADMIN — ENALAPRIL MALEATE 20 MG: 10 TABLET ORAL at 20:48

## 2023-07-09 RX ADMIN — CLONIDINE HYDROCHLORIDE 0.1 MG: 0.1 TABLET ORAL at 14:31

## 2023-07-09 RX ADMIN — CLONIDINE HYDROCHLORIDE 0.1 MG: 0.1 TABLET ORAL at 20:48

## 2023-07-09 RX ADMIN — REGADENOSON 0.4 MG: 0.08 INJECTION, SOLUTION INTRAVENOUS at 10:32

## 2023-07-09 RX ADMIN — ATORVASTATIN CALCIUM 40 MG: 40 TABLET, FILM COATED ORAL at 14:31

## 2023-07-09 RX ADMIN — Medication 30 MILLICURIE: at 09:30

## 2023-07-09 RX ADMIN — SODIUM CHLORIDE, PRESERVATIVE FREE 10 ML: 5 INJECTION INTRAVENOUS at 20:48

## 2023-07-09 RX ADMIN — FUROSEMIDE 20 MG: 20 TABLET ORAL at 14:31

## 2023-07-09 RX ADMIN — GABAPENTIN 100 MG: 100 CAPSULE ORAL at 20:48

## 2023-07-09 RX ADMIN — ENALAPRIL MALEATE 20 MG: 10 TABLET ORAL at 14:31

## 2023-07-09 RX ADMIN — FAMOTIDINE 20 MG: 20 TABLET, FILM COATED ORAL at 20:48

## 2023-07-09 RX ADMIN — GABAPENTIN 100 MG: 100 CAPSULE ORAL at 14:31

## 2023-07-09 ASSESSMENT — ENCOUNTER SYMPTOMS
ABDOMINAL PAIN: 0
COUGH: 0
CHEST TIGHTNESS: 0
CONSTIPATION: 0
DIARRHEA: 0
SHORTNESS OF BREATH: 0
VOMITING: 0
NAUSEA: 0

## 2023-07-10 ENCOUNTER — HOSPITAL ENCOUNTER (OUTPATIENT)
Age: 88
Discharge: HOME | End: 2023-07-10
Attending: INTERNAL MEDICINE | Admitting: INTERNAL MEDICINE
Payer: MEDICARE

## 2023-07-10 VITALS
RESPIRATION RATE: 16 BRPM | DIASTOLIC BLOOD PRESSURE: 61 MMHG | OXYGEN SATURATION: 96 % | TEMPERATURE: 96 F | HEART RATE: 43 BPM | SYSTOLIC BLOOD PRESSURE: 126 MMHG

## 2023-07-10 VITALS
HEIGHT: 63 IN | BODY MASS INDEX: 34.59 KG/M2 | TEMPERATURE: 98 F | OXYGEN SATURATION: 97 % | HEART RATE: 47 BPM | WEIGHT: 195.19 LBS | SYSTOLIC BLOOD PRESSURE: 148 MMHG | DIASTOLIC BLOOD PRESSURE: 88 MMHG | RESPIRATION RATE: 18 BRPM

## 2023-07-10 PROBLEM — I25.10 CAD IN NATIVE ARTERY: Status: ACTIVE | Noted: 2023-07-10

## 2023-07-10 LAB
ABO + RH BLD: NORMAL
ANION GAP SERPL CALCULATED.3IONS-SCNC: 9 MMOL/L (ref 7–16)
BASOPHILS # BLD: 0.03 E9/L (ref 0–0.2)
BASOPHILS NFR BLD: 0.5 % (ref 0–2)
BLD GP AB SCN SERPL QL: NORMAL
BUN SERPL-MCNC: 21 MG/DL (ref 6–23)
CALCIUM SERPL-MCNC: 8.9 MG/DL (ref 8.6–10.2)
CHLORIDE SERPL-SCNC: 107 MMOL/L (ref 98–107)
CO2 SERPL-SCNC: 26 MMOL/L (ref 22–29)
CREAT SERPL-MCNC: 1.2 MG/DL (ref 0.5–1)
EOSINOPHIL # BLD: 0.12 E9/L (ref 0.05–0.5)
EOSINOPHIL NFR BLD: 2 % (ref 0–6)
ERYTHROCYTE [DISTWIDTH] IN BLOOD BY AUTOMATED COUNT: 13.8 FL (ref 11.5–15)
GLUCOSE SERPL-MCNC: 110 MG/DL (ref 74–99)
HCT VFR BLD AUTO: 40.4 % (ref 34–48)
HGB BLD-MCNC: 13 G/DL (ref 11.5–15.5)
IMM GRANULOCYTES # BLD: 0.02 E9/L
IMM GRANULOCYTES NFR BLD: 0.3 % (ref 0–5)
LV EF: 60 %
LVEF MODALITY: NORMAL
LYMPHOCYTES # BLD: 1.94 E9/L (ref 1.5–4)
LYMPHOCYTES NFR BLD: 32.2 % (ref 20–42)
MCH RBC QN AUTO: 29.8 PG (ref 26–35)
MCHC RBC AUTO-ENTMCNC: 32.2 % (ref 32–34.5)
MCV RBC AUTO: 92.7 FL (ref 80–99.9)
MONOCYTES # BLD: 0.49 E9/L (ref 0.1–0.95)
MONOCYTES NFR BLD: 8.1 % (ref 2–12)
NEUTROPHILS # BLD: 3.42 E9/L (ref 1.8–7.3)
NEUTS SEG NFR BLD: 56.9 % (ref 43–80)
PLATELET # BLD AUTO: 186 E9/L (ref 130–450)
PMV BLD AUTO: 11.2 FL (ref 7–12)
POTASSIUM SERPL-SCNC: 4.1 MMOL/L (ref 3.5–5)
RBC # BLD AUTO: 4.36 E12/L (ref 3.5–5.5)
REASON FOR REJECTION: NORMAL
REJECTED TEST: NORMAL
SODIUM SERPL-SCNC: 142 MMOL/L (ref 132–146)
WBC # BLD: 6 E9/L (ref 4.5–11.5)

## 2023-07-10 PROCEDURE — 36415 COLL VENOUS BLD VENIPUNCTURE: CPT

## 2023-07-10 PROCEDURE — 6370000000 HC RX 637 (ALT 250 FOR IP)

## 2023-07-10 PROCEDURE — 86900 BLOOD TYPING SEROLOGIC ABO: CPT

## 2023-07-10 PROCEDURE — C1769 GUIDE WIRE: HCPCS

## 2023-07-10 PROCEDURE — 86901 BLOOD TYPING SEROLOGIC RH(D): CPT

## 2023-07-10 PROCEDURE — 86850 RBC ANTIBODY SCREEN: CPT

## 2023-07-10 PROCEDURE — C1894 INTRO/SHEATH, NON-LASER: HCPCS

## 2023-07-10 PROCEDURE — C1887 CATHETER, GUIDING: HCPCS

## 2023-07-10 PROCEDURE — 93571 IV DOP VEL&/PRESS C FLO 1ST: CPT | Performed by: INTERNAL MEDICINE

## 2023-07-10 PROCEDURE — 2709999900 HC NON-CHARGEABLE SUPPLY

## 2023-07-10 PROCEDURE — 6360000002 HC RX W HCPCS

## 2023-07-10 PROCEDURE — 93459 L HRT ART/GRFT ANGIO: CPT | Performed by: INTERNAL MEDICINE

## 2023-07-10 PROCEDURE — 93459 L HRT ART/GRFT ANGIO: CPT

## 2023-07-10 PROCEDURE — 85025 COMPLETE CBC W/AUTO DIFF WBC: CPT

## 2023-07-10 PROCEDURE — C1760 CLOSURE DEV, VASC: HCPCS

## 2023-07-10 PROCEDURE — 93571 IV DOP VEL&/PRESS C FLO 1ST: CPT

## 2023-07-10 PROCEDURE — 2580000003 HC RX 258: Performed by: INTERNAL MEDICINE

## 2023-07-10 PROCEDURE — 6370000000 HC RX 637 (ALT 250 FOR IP): Performed by: INTERNAL MEDICINE

## 2023-07-10 PROCEDURE — 2500000003 HC RX 250 WO HCPCS

## 2023-07-10 PROCEDURE — 80048 BASIC METABOLIC PNL TOTAL CA: CPT

## 2023-07-10 RX ORDER — BUSPIRONE HYDROCHLORIDE 5 MG/1
5 TABLET ORAL DAILY
Status: DISCONTINUED | OUTPATIENT
Start: 2023-07-10 | End: 2023-07-10 | Stop reason: HOSPADM

## 2023-07-10 RX ORDER — ACETAMINOPHEN 325 MG/1
650 TABLET ORAL EVERY 4 HOURS PRN
Status: DISCONTINUED | OUTPATIENT
Start: 2023-07-10 | End: 2023-07-10 | Stop reason: HOSPADM

## 2023-07-10 RX ORDER — SODIUM CHLORIDE 9 MG/ML
INJECTION, SOLUTION INTRAVENOUS CONTINUOUS
Status: ACTIVE | OUTPATIENT
Start: 2023-07-10 | End: 2023-07-10

## 2023-07-10 RX ORDER — FAMOTIDINE 20 MG/1
20 TABLET, FILM COATED ORAL DAILY
Status: DISCONTINUED | OUTPATIENT
Start: 2023-07-10 | End: 2023-07-10 | Stop reason: HOSPADM

## 2023-07-10 RX ADMIN — ISOSORBIDE MONONITRATE 30 MG: 30 TABLET, EXTENDED RELEASE ORAL at 09:06

## 2023-07-10 RX ADMIN — ASPIRIN 81 MG CHEWABLE TABLET 81 MG: 81 TABLET CHEWABLE at 09:06

## 2023-07-10 RX ADMIN — LEVOTHYROXINE SODIUM 50 MCG: 0.05 TABLET ORAL at 06:11

## 2023-07-10 RX ADMIN — SODIUM CHLORIDE: 9 INJECTION, SOLUTION INTRAVENOUS at 13:14

## 2023-07-10 RX ADMIN — FAMOTIDINE 20 MG: 20 TABLET, FILM COATED ORAL at 09:06

## 2023-07-10 NOTE — PROGRESS NOTES
Your patient is on a medication that requires a renal and/or weight dose adjustment. Renal/Body Weight Function Assessment:    Date Body Weight IBW  Adjusted BW SCr  CrCl Dialysis status   7/10/2023 195 lb 3 oz (88.5 kg)  Ideal body weight: 52.4 kg (115 lb 8.3 oz)  Adjusted ideal body weight: 66.9 kg (147 lb 6.2 oz) Serum creatinine: 1.2 mg/dL (H) 07/10/23 0536  Estimated creatinine clearance: 35 mL/min (A) N/A       Pharmacy has dose-adjusted the following medication(s):    Date Previous Order Adjusted Order   7/10/2023 Famotidine 20 mg BID Famotidine 20 mg Daily       These changes were made per protocol according to the [address] [address] [facility] Renal Dosing Policy/ [address] [address] [facility] Pharmacist Anticoagulant Review. *Please note this dose may need readjusted if your patient's condition changes. Please contact pharmacy with any questions regarding these changes.     TRUONG Espinoza [facility]  7/10/2023  8:21 AM

## 2023-07-10 NOTE — PROGRESS NOTES
Patient arrived to the floor from [facility] with the following belongings:     [address] [place] [address]

## 2023-07-10 NOTE — DISCHARGE SUMMARY
Physician Discharge Summary  [facility]     Patient ID:  Srinivasan Toure  [id number]  75 y.o.  1935    Admit date: 7/10/2023    Discharge date: 7/10/2023    Admission Diagnoses:   CAD in native artery [I25.10]    Discharge Diagnoses: Active Problems:    * No active hospital problems. *  Resolved Problems:    * No resolved hospital problems. *      Consults: cardiology  Procedures: Stress test, cardiac catheterization    Hospital Course: Srinivasan Toure  is a 82 y.o. female patient of Ariana Daily MD  with a pertinent PMHx of CAD/MI s/p CABG x2 2007, HTN, HLD, HFpEF, bradycardia, hypothyroidism, CKD 3a, and anxiety, who presented to the ED from home accompanied by son with chief complaint of chest pain and was admitted for atypical chest pain. Work-up in the ED revealed stable EKG with sinus bradycardia revealing of a possible anterior infarct of undetermined age. Her troponin was downtrending. She was given aspirin, her home Lipitor dose was increased to 40 mg daily. Cardiology was consulted in the care of this patient, she underwent a stress test which revealed reversible defect suggestive of mild ischemia involving the distal inferior lateral wall and apical anterior wall determined as intermediate risk. She then had a left heart catheterization with coronary angiography which revealed ostial diffuse subtotal occlusion of the LAD with ostial diffuse 60% stenosis of the ramus and 100% occlusion of the SVG-ramus. She should continue her home medications with the addition of ASA 81 mg. She should follow up with her PCP at her scheduled appointment on Friday 7/14/23 at 2:30 pm and follow up with Dr. Anali Nowak cardiology outpatient. Consider medically optimizing patient with inpatient regimen including Imdur 30 mg, Lipitor 40 mg, and holding home guanfacine.  Ms. Amanda Salas would likely benefit from a daily anti-anxiety medication, perhaps scheduling her Buspar 5 mg prn as

## 2023-07-10 NOTE — DISCHARGE INSTRUCTIONS
FEMORAL DISCHARGE INSTRUCTIONS  Discharge Instructions:    Please follow instructions closely for prevention of complications. Special Instructions:  Splint catheterization site with activity today. Splint catheterization site with:             ~Changing Positions             ~Coughing             ~Sneezing             ~Laughing    Call your physician if you notice:  ~increased pain at the catheterization site.  ~increase swelling at the catheterization site. ~redness or drainage at the catheterization site. ~numbness, tingling or cramping in the catheterization leg at rest or with activity. *Remove Dressing on 7/11 After 12 PM       ~Soak dressing with water in shower and gently peel off. Clean site with soap and water, dry, and apply band aide for 24 hours. Remove band aide after another 24 hours. *Drink 3-4 extra glasses of water today. *No tub bathing or swimming for 3 days. *No stairs for 24 hours. Minimal walking today. *No driving, working, or sexual activity for 48 hours. *Do not lift anything heavier than 10 pounds for 3 days. *Continue low fat diet. **If you have any questions or problems after discharge, please notify your doctor. Call 9-1-1 if you have active bleeding from your catheterization site. ***DO  N [facility]. Lay down and apply pressure to site until help arrives.

## 2023-07-10 NOTE — PROCEDURES
Procedure:    Left heart catheterization with coronary angiography  IFR of the ramus    Physician: Steffi Mullins. Giulia ROWE Assistant: none    Indication: Chest pain, abnormal stress test.  AUC: 7  AUC indication: 16    Complications: None    Anesthesia: Xylocaine, fentanyl     Sedation: Versed    Sedation time: I was present for sedation administration at 1133. I ended sedation administration at 1216 for a total face-to-face sedation time of 43 minutes. Estimated blood loss: minimal    Specimens: none    Contrast used: 105 cc    Hemodynamics:  Opening Aortic pressure: 556/01  LV systolic pressure: 383  LVEDP: 10  No gradient across the aortic valve. IFR of the proximal ramus 0.95, 0.94, 0.93    Angiographic Results/findings:  Left main: Tortuous. No angiographically significant stenosis  LAD: Ostial diffuse subtotal occlusion  D1: Small vessel. No angiographically significant stenosis  Circumflex: Small vessel. No angiographically significant stenosis  OM1: Small vessel. Ramus: Ostial diffuse 60% stenosis. Right coronary artery: Mild diffuse luminal irregularities  PDA: No angiographically significant stenosis  PLB: No angiographically significant stenosis  LIMA-LAD: Widely patent with good distal runoff  SVG-ramus: Ostial 100% occlusion  LV gram: Normal systolic function. No wall motion abnormalities. Ejection fraction 60%. Procedure:   After obtaining informed consent the patient was taken to the cardiac Cath Lab where the area over the right femoral artery was prepped and draped in a sterile fashion. Using a Seldinger technique and with fluoroscopic and ultrasound guidance a 5 [place] sheath was placed in the right femoral artery. This was aspirated and flushed several times throughout the procedure. A 5 [place] JL4 diagnostic catheter was advanced over a wire to the aortic root. This was aspirated and flushed with saline. Pressures were obtained.   This was then manipulated into the left main

## 2023-07-10 NOTE — PROGRESS NOTES
Extended Stay Recovery Discharge Documentation    Final procedure site check completed, stable for discharge- Yes  Ambulated without issue post recovery completion, gait steady. Peripheral IV sites removed (see IV Flowsheet) and site asymptomatic- Yes  Patient dressed self without assistance. Discharge instructions reviewed with Patient and verbalized understanding.    Patient discharged Home with son at 65 PM  Monitor cleaned and placed back in nurses' station- Yes    Patient left the floor with the following belongings:  [address] phone  Discharge instructions  Angioseal card

## 2023-07-11 ENCOUNTER — TELEPHONE (OUTPATIENT)
Dept: ADMINISTRATIVE | Age: 88
End: 2023-07-11

## 2023-07-11 ENCOUNTER — CARE COORDINATION (OUTPATIENT)
Dept: CASE MANAGEMENT | Age: 88
End: 2023-07-11

## 2023-07-11 DIAGNOSIS — R07.9 CHEST PAIN, UNSPECIFIED TYPE: Primary | ICD-10-CM

## 2023-07-11 PROCEDURE — 1111F DSCHRG MED/CURRENT MED MERGE: CPT | Performed by: FAMILY MEDICINE

## 2023-07-11 NOTE — TELEPHONE ENCOUNTER
Pt cld to schedule hospital f/u with Dr Felicity Jacobs, discharged 7/10/23;  call her at [phone]

## 2023-07-11 NOTE — CARE COORDINATION
[facility] Care Transitions Initial Follow Up Call    Call within 2 business days of discharge: Yes    Patient Current Location:  Home: [facility]    Care Transition Nurse contacted the patient by telephone to perform post hospital discharge assessment. Provided introduction to self, and explanation of the Care Transition Nurse role. Patient: Harika Wells Patient : 1935   MRN: [id number]  Reason for Admission: There are no discharge diagnoses documented for the most recent discharge. Discharge Date: 7/10/23 RARS: Readmission Risk Score: 9.1      Last Discharge [address] [facility] [facility]       Date Complaint Diagnosis Description Type Department Provider    7/10/23   Admission (Discharged) from OP Visit [facility] Reinaldos[facility] Adele, DO            Was this an external facility discharge? No     Challenges to be reviewed by the provider   Additional needs identified to be addressed with provider: No  none               Method of communication with provider: none. CTN called and spoke with the pt for an initial care transition call post hospital discharge. Pt admitted on 23 with dx CP. Pt is s/p LHC on 7/10/23. Pt presented to ED with c/o chest heaviness/pressure with alisha jaw pain/pressure. Pt also had c/o R post shoulder pain. Pt states that she is feeling well today and offered no complaints. Pt denies any recurrent CP/chest pressure or heaviness. Pt denies any sob, dizziness, lightheadedness, or jaw pain. Pt reports that [address] [place] [address] dsg is still in place and is C/D/I. Pt was advised per AVS today that she can remove dsg after 12 noon today and clean. Pt advised to place band aid on site x24 hrs and then can remove. Pt denies any LE cramping, pain, numbness, or tingling. Pt was educated on monitoring LHC site for any redness, swelling, or drainage, as well as any complaints of LE cramping, pain, numbness or tingling. Pt advised to contact cardiology if any post procedure complications.  Pt voiced

## 2023-07-17 ENCOUNTER — OFFICE VISIT (OUTPATIENT)
Dept: FAMILY MEDICINE CLINIC | Age: 88
End: 2023-07-17

## 2023-07-17 VITALS
HEIGHT: 63 IN | WEIGHT: 186 LBS | HEART RATE: 55 BPM | BODY MASS INDEX: 32.96 KG/M2 | TEMPERATURE: 98.2 F | OXYGEN SATURATION: 92 % | DIASTOLIC BLOOD PRESSURE: 68 MMHG | RESPIRATION RATE: 16 BRPM | SYSTOLIC BLOOD PRESSURE: 156 MMHG

## 2023-07-17 DIAGNOSIS — Z79.899 MEDICATION MANAGEMENT: ICD-10-CM

## 2023-07-17 DIAGNOSIS — Z09 HOSPITAL DISCHARGE FOLLOW-UP: Primary | ICD-10-CM

## 2023-07-17 DIAGNOSIS — R07.9 CHEST PAIN, UNSPECIFIED TYPE: ICD-10-CM

## 2023-07-17 NOTE — PROGRESS NOTES
Post-Discharge Transitional Care  Follow Up      Caden Gonzalez   YOB: 1935    Date of Office Visit:  7/17/2023  Date of Hospital Admission: 7/10/23  Date of Hospital Discharge: 7/10/23  Risk of hospital readmission (high >=14%. Medium >=10%) :Readmission Risk Score: 9.1      Care management risk score Rising risk (score 2-5) and Complex Care (Scores >=6): No Risk Score On File     Non face to face  following discharge, date last encounter closed (first attempt may have been earlier): 07/11/2023    Call initiated 2 business days of discharge: Yes    ASSESSMENT/PLAN:   Hospital discharge follow-up  -     MI DISCHARGE MEDS RECONCILED W/ CURRENT OUTPATIENT MED LIST  Medication management  Chest pain, unspecified type-patient advised to continue taking statin, asprin, follow-up appointment with cardiology, continue to discuss additional medications including the Imdur. Medical Decision Making: moderate complexity  Return in 3 months (on 10/17/2023). Subjective:   Caden Gonzalez 82year old female with a pertinent PMHx of CAD/MI s/p CABG x2 2007, HTN, HLD, HFpEF, bradycardia, hypothyroidism, CKD 3a, and anxiety, recently admitted for atypical chest pain. HPI:  Follow up of Hospital problems/diagnosis(es): Chest pain , found to have abnormal stress test and requiring cardiac catheterization. Patient was seen by cardiology and underwent a She then had a left heart catheterization with coronary angiography which revealed ostial diffuse subtotal occlusion of the LAD with ostial diffuse 60% stenosis of the ramus and 100% occlusion of the SVG-ramus. Coreg was discontinued due to sinus bradycardia. Recommended to start Imdur (11/16/2022) patient had declined at that time. Avoid AV tiffanie blocking agents. Briefly discussed Imdur at today's visit again patient states that she will wait to see cardiology and discussed that at length with them if it is needed or not.     Bp elevated today ,

## 2023-07-18 ENCOUNTER — CARE COORDINATION (OUTPATIENT)
Dept: CASE MANAGEMENT | Age: 88
End: 2023-07-18

## 2023-07-18 NOTE — CARE COORDINATION
[facility] Care Transitions Follow Up Call    Patient Current Location:  Home: [facility]    Care Transition Nurse contacted the patient by telephone to follow up after admission on 7/10/23. Patient: Davina Guidry  Patient : 1935   MRN: [id number]  Reason for Admission: CP  Discharge Date: 7/10/23 RARS: Readmission Risk Score: 9.1      Needs to be reviewed by the provider   Additional needs identified to be addressed with provider: No  none             Method of communication with provider: none. CTN called and spoke with the pt for a sub care transition call. Pt admitted on 23 with dx CP. Pt is s/p LHC on 7/10/23. Pt presented to ED with c/o chest heaviness/pressure with alisha jaw pain/pressure. Pt also had c/o R post shoulder pain. Pt states that she is feeling well today and offered no complaints. Pt denies any recurrent CP/chest pressure or heaviness. Pt denies any sob, dizziness, lightheadedness, or jaw pain. Pt reports no issues post LHC procedure. Pt states that she does not feel her CP was r/t a cardiac issue, but rather possibly a \"panic attack. \" Pt has been taking Buspar daily with good relief per pt. Pt completed a HFU appt yesterday with her pcp. No new changes/instructions at OV appt. Noted BP elevated at OV. Pt states that she used to check her BP readings BID and then her BP cuff broke. She states that she did receive a new BP cuff but has not monitored in quite a while stating that her macular degeneration makes it difficult to see the readings and she forgets to check her BP when she does have family present. Pt does have hx diastolic dysfunction and CKD and does engage in daily wts. Pt reports minimal fluctuations in wt and denies any dramatic wt increases. Pt denies any s/sx indicating a worsening in her condition. Pt reports compliance with Lasix and states that she has an excellent u.o. with use.  Pt states that she has baseline BLE edema (L>R) and denies

## 2023-08-02 ENCOUNTER — CARE COORDINATION (OUTPATIENT)
Dept: CASE MANAGEMENT | Age: 88
End: 2023-08-02

## 2023-08-02 NOTE — CARE COORDINATION
[facility] Final Care Transitions Follow Up Call    Patient Current Location:  Home: [facility]    Care Transition Nurse contacted the patient by telephone to follow up after admission on 23. Patient: Brenda Mckenzie  Patient : 1935   MRN: [id number]  Reason for Admission: CP  Discharge Date: 7/10/23 RARS: Readmission Risk Score: 9.1      Needs to be reviewed by the provider   Additional needs identified to be addressed with provider: No  none             Method of communication with provider: none. CTN called and spoke with the pt for a sub care transition call. Pt admitted on 23 with dx CP. Pt is s/p LHC on 7/10/23. Pt presented to ED with c/o chest heaviness/pressure with alisha jaw pain/pressure. Pt also had c/o R post shoulder pain. Pt states that she is doing good today and offered no complaints. Pt denies any recurrent CP/chest pressure. Reviewed upcoming appts with her pcp on 23 and with Dr. Liana Pool on 10/16/23. Pt aware of both appts and intends on going to them. Pt did not voice any further needs/concerns and does not feel that she needs any additional f/u calls from CTN. CTN will sign off and resolve CT episode today as no other needs/concerns identified by CTN or voiced by pt. Addressed changes since last contact:  none    Follow Up  Future Appointments   Date Time Provider [address]   2023 10:15 AM Allan Harrison MD [facility]   10/16/2023  9:30 AM Ofelia Strauss MD [address] [place] [address]   2023 10:45 AM MD VALE Recinos [facility]       Care Transition Nurse reviewed medical action plan and red flags with patient and discussed any barriers to care and/or understanding of plan of care after discharge. Discussed appropriate site of care based on symptoms and resources available to patient including: PCP  Specialist  Urgent care clinics  When to call Juan Rowan.  The patient agrees to contact the PCP office for

## 2023-08-08 ENCOUNTER — OFFICE VISIT (OUTPATIENT)
Dept: FAMILY MEDICINE CLINIC | Age: 88
End: 2023-08-08
Payer: MEDICARE

## 2023-08-08 VITALS
RESPIRATION RATE: 16 BRPM | SYSTOLIC BLOOD PRESSURE: 126 MMHG | HEIGHT: 63 IN | DIASTOLIC BLOOD PRESSURE: 69 MMHG | OXYGEN SATURATION: 98 % | TEMPERATURE: 97.5 F | WEIGHT: 191 LBS | BODY MASS INDEX: 33.84 KG/M2 | HEART RATE: 47 BPM

## 2023-08-08 DIAGNOSIS — Z79.899 MEDICATION MANAGEMENT: ICD-10-CM

## 2023-08-08 DIAGNOSIS — N32.81 OAB (OVERACTIVE BLADDER): ICD-10-CM

## 2023-08-08 DIAGNOSIS — N18.30 STAGE 3 CHRONIC KIDNEY DISEASE, UNSPECIFIED WHETHER STAGE 3A OR 3B CKD (HCC): ICD-10-CM

## 2023-08-08 DIAGNOSIS — R00.1 BRADYCARDIA: Primary | ICD-10-CM

## 2023-08-08 DIAGNOSIS — I10 ESSENTIAL HYPERTENSION: ICD-10-CM

## 2023-08-08 DIAGNOSIS — I25.10 CAD IN NATIVE ARTERY: ICD-10-CM

## 2023-08-08 PROCEDURE — 1123F ACP DISCUSS/DSCN MKR DOCD: CPT | Performed by: FAMILY MEDICINE

## 2023-08-08 PROCEDURE — 99214 OFFICE O/P EST MOD 30 MIN: CPT | Performed by: FAMILY MEDICINE

## 2023-08-08 RX ORDER — GUANFACINE 1 MG/1
1 TABLET ORAL NIGHTLY
Qty: 90 TABLET | Refills: 3 | Status: SHIPPED | OUTPATIENT
Start: 2023-08-08

## 2023-08-08 RX ORDER — BUSPIRONE HYDROCHLORIDE 5 MG/1
5 TABLET ORAL 2 TIMES DAILY
Qty: 180 TABLET | Refills: 1 | Status: SHIPPED | OUTPATIENT
Start: 2023-08-08

## 2023-08-08 RX ORDER — DARIFENACIN HYDROBROMIDE 7.5 MG/1
7.5 TABLET, EXTENDED RELEASE ORAL DAILY
Qty: 90 TABLET | Refills: 3 | Status: SHIPPED
Start: 2023-08-08 | End: 2023-08-11 | Stop reason: CLARIF

## 2023-08-08 RX ORDER — BUSPIRONE HYDROCHLORIDE 5 MG/1
5 TABLET ORAL 2 TIMES DAILY
Qty: 180 TABLET | Refills: 1 | Status: SHIPPED
Start: 2023-08-08 | End: 2023-08-08 | Stop reason: SDUPTHER

## 2023-08-08 RX ORDER — CALCITRIOL 0.25 UG/1
CAPSULE, LIQUID FILLED ORAL
Qty: 30 CAPSULE | Status: CANCELLED | OUTPATIENT
Start: 2023-08-09

## 2023-08-08 ASSESSMENT — ENCOUNTER SYMPTOMS
ABDOMINAL PAIN: 0
SHORTNESS OF BREATH: 0
COUGH: 0
TROUBLE SWALLOWING: 0
WHEEZING: 0

## 2023-08-08 NOTE — PROGRESS NOTES
Cheli Nicole is a 82y.o. year old female Established patient, here for evaluation of the following chief complaint(s):    Chief Complaint   Patient presents with    Coronary Artery Disease       Issac Hay was seen today for coronary artery disease. Diagnoses and all orders for this visit:    Bradycardia    CAD in native artery    Medication management  -     Discontinue: busPIRone (BUSPAR) 5 MG tablet; Take 1 tablet by mouth 2 times daily TAKE 1 TABLET DAILY AS    NEEDED. -     busPIRone (BUSPAR) 5 MG tablet; Take 1 tablet by mouth 2 times daily    Stage 3 chronic kidney disease, unspecified whether stage 3a or 3b CKD ([facility])    OAB (overactive bladder)  -     darifenacin (ENABLEX) 7.5 MG extended release tablet; Take 1 tablet by mouth daily    Other orders  -     guanFACINE (TENEX) 1 MG tablet; Take 1 tablet by mouth nightly        Plan    Patient continues to experience fatigue and bradycardia. However she did not start the carvedilol as was recommended at hospital discharge. Unclear why this was not caught at last visit. Will start holding the medication, follow pulse and blood pressure at home as she has been doing. No further angina, holding off on Imdur. Prescription management performed with refill of guanfacine as above. Keep follow-up with nephrology. Having problems with overactive bladder meds. Ditropan is causing too much dry mouth. We will start Enablex. Side effects discussed. Cannot rule out dry mouth occurring with this medication either      Patient/guardian was given the opportunity to ask questions regarding the visit today. Questions were addressed. They verbalized comfort and understanding of the assessment and plan. Return for keep next appt as scheduled or see sooner if neded. SUBJECTIVE/OBJECTIVE:    HPI    Vision problems  Does have macular degeneration- likely issues are related to this. Continues to have visual field disruption.     Has given up driving  Sees

## 2023-08-11 RX ORDER — SOLIFENACIN SUCCINATE 5 MG/1
5 TABLET, FILM COATED ORAL DAILY
Qty: 90 TABLET | Refills: 1 | Status: SHIPPED | OUTPATIENT
Start: 2023-08-11

## 2023-09-10 ENCOUNTER — PATIENT MESSAGE (OUTPATIENT)
Dept: FAMILY MEDICINE CLINIC | Age: 88
End: 2023-09-10

## 2023-09-10 DIAGNOSIS — M54.17 LUMBOSACRAL RADICULOPATHY AT S1: Primary | ICD-10-CM

## 2023-09-11 RX ORDER — GABAPENTIN 100 MG/1
100 CAPSULE ORAL 2 TIMES DAILY
Qty: 180 CAPSULE | Refills: 0 | Status: SHIPPED | OUTPATIENT
Start: 2023-09-11 | End: 2023-12-10

## 2023-09-11 NOTE — TELEPHONE ENCOUNTER
From: Toan Cabral  To: Dr. Isaacs Cleveland: 9/10/2023 6:20 PM EDT  Subject: Gabapentin    Could you please order 90 day script of Gabapentin from [facility]. I am completely out.     Many Thanks,  Troy Richter
Refill pended.     Last Appointment   8/8/2023  Next Appointment  11/14/2023
138

## 2023-10-19 RX ORDER — SOLIFENACIN SUCCINATE 5 MG/1
5 TABLET, FILM COATED ORAL DAILY
Qty: 100 TABLET | Refills: 2 | Status: SHIPPED | OUTPATIENT
Start: 2023-10-19

## 2023-11-05 DIAGNOSIS — M54.17 LUMBOSACRAL RADICULOPATHY AT S1: ICD-10-CM

## 2023-11-06 ENCOUNTER — OFFICE VISIT (OUTPATIENT)
Dept: CARDIOLOGY CLINIC | Age: 88
End: 2023-11-06
Payer: MEDICARE

## 2023-11-06 VITALS
BODY MASS INDEX: 34.2 KG/M2 | DIASTOLIC BLOOD PRESSURE: 78 MMHG | WEIGHT: 193 LBS | RESPIRATION RATE: 16 BRPM | HEIGHT: 63 IN | HEART RATE: 47 BPM | SYSTOLIC BLOOD PRESSURE: 146 MMHG

## 2023-11-06 DIAGNOSIS — R00.1 BRADYCARDIA: ICD-10-CM

## 2023-11-06 DIAGNOSIS — Z95.1 HX OF CABG: ICD-10-CM

## 2023-11-06 DIAGNOSIS — I10 ESSENTIAL HYPERTENSION: ICD-10-CM

## 2023-11-06 DIAGNOSIS — I25.10 CORONARY ARTERY DISEASE INVOLVING NATIVE CORONARY ARTERY OF NATIVE HEART WITHOUT ANGINA PECTORIS: Primary | ICD-10-CM

## 2023-11-06 DIAGNOSIS — E78.2 MIXED HYPERLIPIDEMIA: ICD-10-CM

## 2023-11-06 PROCEDURE — 1123F ACP DISCUSS/DSCN MKR DOCD: CPT | Performed by: INTERNAL MEDICINE

## 2023-11-06 PROCEDURE — 99214 OFFICE O/P EST MOD 30 MIN: CPT | Performed by: INTERNAL MEDICINE

## 2023-11-06 PROCEDURE — 93000 ELECTROCARDIOGRAM COMPLETE: CPT | Performed by: INTERNAL MEDICINE

## 2023-11-06 RX ORDER — GABAPENTIN 100 MG/1
100 CAPSULE ORAL 2 TIMES DAILY
Qty: 180 CAPSULE | Refills: 3 | OUTPATIENT
Start: 2023-11-06

## 2023-11-06 NOTE — PROGRESS NOTES
OUTPATIENT CARDIOLOGY FOLLOW-UP    Name: Demetrius Ewing    Age: 82 y.o. Date of Service: 11/6/2023    Chief Complaint: Follow-up for chest pain, CAD    Referring Physician: Jeni Canas MD    History of Present Illness:   She denies recent chest pain. She denies LOVE, palpitations, orthopnea, or syncope. She is currently with no active cardiac complaints at rest. SB on EKG (+history of sinus bradycardia -- \"I can't tell\"). She reports, \"I feel fine\". S/p cardiac catheterization in 7/2023.     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 Prince Diggs yearly    Dysphagia     Hyperlipidemia     Hypertension     Hypothyroid     Impaired glucose tolerance     Liver cyst     Lumbosacral radiculopathy at S1 12/2015    left sided    Macular degeneration 06/2013    Renal cyst     seen by Dr Jonathan Fisher; benign    Vertigo 02/2015       Past Surgical History:  Past Surgical History:   Procedure Laterality Date    APPENDECTOMY      CARDIAC SURGERY  2007    double bypass @ [address] [facility] [address] [facility] (Dr. Travis Denise)    Swathi Gallardo      right    COLONOSCOPY  2007    Dr Francesca Noland (CERVIX STATUS UNKNOWN)      partial (ovaries remain)    VITRECTOMY Right 11/2/2020    RIGHT EYE PARS PLANA  VITRECTOMY 25 GAUGE performed by Ninfa CASILLAS

## 2023-11-14 ENCOUNTER — OFFICE VISIT (OUTPATIENT)
Dept: FAMILY MEDICINE CLINIC | Age: 88
End: 2023-11-14
Payer: MEDICARE

## 2023-11-14 VITALS
TEMPERATURE: 97.3 F | HEART RATE: 56 BPM | OXYGEN SATURATION: 97 % | WEIGHT: 192 LBS | DIASTOLIC BLOOD PRESSURE: 80 MMHG | BODY MASS INDEX: 34.02 KG/M2 | SYSTOLIC BLOOD PRESSURE: 167 MMHG | RESPIRATION RATE: 16 BRPM | HEIGHT: 63 IN

## 2023-11-14 DIAGNOSIS — N32.81 OVERACTIVE BLADDER: ICD-10-CM

## 2023-11-14 DIAGNOSIS — M54.17 LUMBOSACRAL RADICULOPATHY AT S1: ICD-10-CM

## 2023-11-14 DIAGNOSIS — N18.32 STAGE 3B CHRONIC KIDNEY DISEASE (HCC): ICD-10-CM

## 2023-11-14 DIAGNOSIS — R51.9 NONINTRACTABLE EPISODIC HEADACHE, UNSPECIFIED HEADACHE TYPE: ICD-10-CM

## 2023-11-14 DIAGNOSIS — R00.1 BRADYCARDIA: ICD-10-CM

## 2023-11-14 DIAGNOSIS — Z00.00 MEDICARE ANNUAL WELLNESS VISIT, SUBSEQUENT: Primary | ICD-10-CM

## 2023-11-14 PROBLEM — I51.89 DIASTOLIC DYSFUNCTION: Status: ACTIVE | Noted: 2019-09-05

## 2023-11-14 PROBLEM — E55.9 VITAMIN D DEFICIENCY: Status: ACTIVE | Noted: 2021-09-17

## 2023-11-14 LAB
C-REACTIVE PROTEIN: 6 MG/L (ref 0–5)
SEDIMENTATION RATE, ERYTHROCYTE: 22 MM/HR (ref 0–20)

## 2023-11-14 PROCEDURE — 1123F ACP DISCUSS/DSCN MKR DOCD: CPT | Performed by: FAMILY MEDICINE

## 2023-11-14 PROCEDURE — 36415 COLL VENOUS BLD VENIPUNCTURE: CPT | Performed by: FAMILY MEDICINE

## 2023-11-14 PROCEDURE — G0439 PPPS, SUBSEQ VISIT: HCPCS | Performed by: FAMILY MEDICINE

## 2023-11-14 RX ORDER — GABAPENTIN 100 MG/1
100 CAPSULE ORAL 2 TIMES DAILY
Qty: 180 CAPSULE | Refills: 0 | Status: SHIPPED | OUTPATIENT
Start: 2023-11-14 | End: 2024-02-12

## 2023-11-14 ASSESSMENT — PATIENT HEALTH QUESTIONNAIRE - PHQ9
SUM OF ALL RESPONSES TO PHQ QUESTIONS 1-9: 0
SUM OF ALL RESPONSES TO PHQ9 QUESTIONS 1 & 2: 0
SUM OF ALL RESPONSES TO PHQ QUESTIONS 1-9: 0
SUM OF ALL RESPONSES TO PHQ QUESTIONS 1-9: 0
1. LITTLE INTEREST OR PLEASURE IN DOING THINGS: 0
2. FEELING DOWN, DEPRESSED OR HOPELESS: 0
SUM OF ALL RESPONSES TO PHQ QUESTIONS 1-9: 0

## 2023-11-14 ASSESSMENT — LIFESTYLE VARIABLES
HOW MANY STANDARD DRINKS CONTAINING ALCOHOL DO YOU HAVE ON A TYPICAL DAY: PATIENT DOES NOT DRINK
HOW OFTEN DO YOU HAVE A DRINK CONTAINING ALCOHOL: NEVER

## 2023-11-14 NOTE — PATIENT INSTRUCTIONS
Summary for your review. Other Preventive Recommendations:    A preventive eye exam performed by an eye specialist is recommended every 1-2 years to screen for glaucoma; cataracts, macular degeneration, and other eye disorders. A preventive dental visit is recommended every 6 months. Try to get at least 150 minutes of exercise per week or 10,000 steps per day on a pedometer . Order or download the FREE \"Exercise & Physical Activity: Your Everyday Guide\" from [employer]. Call 3-490.724.7307 or search [employer] online. You need 2347-2761 mg of calcium and 4137-5260 IU of vitamin D per day. It is possible to meet your calcium requirement with diet alone, but a vitamin D supplement is usually necessary to meet this goal.  When exposed to the sun, use a sunscreen that protects against both UVA and UVB radiation with an SPF of 30 or greater. Reapply every 2 to 3 hours or after sweating, drying off with a towel, or swimming. Always wear a seat belt when traveling in a car. Always wear a helmet when riding a bicycle or motorcycle.

## 2023-11-14 NOTE — PROGRESS NOTES
instructions/AVS.  Recommended screening schedule for the next 5-10 years is provided to the patient in written form: see Patient Instructions/AVS.     Return in 4 months (on 3/14/2024), or if symptoms worsen or fail to improve, for routine issues; Medicare Annual Wellness Visit in 1 year. Subjective       Headache  Acute complaint  Noting episodic sharp stabbing pain over the right temporal area  Lasts a matter of minutes then resolves  Has chronic visual disturbance from her macular degeneration so unable to describe any visual disturbances beyond her baseline  Not accompanied by any fever sweats or chills  No awakenings with headaches    Bradycardia  Follow-up  Last visit Coreg was held  She is feeling a little bit better with respect to syncope/near syncope  No further chest pains  Blood pressure is modestly elevated today. Significance of this is unclear, she has not been checking at home  Does report headaches as above otherwise no focal signs of accelerated hypertension    CKD stage III  Sees nephrology, note in chart reviewed  Secondary hyperparathyroidism is also stable    Lumbar radiculopathy  Chronic issue present for years  Needs medications refilled, Neurontin  Tolerating well without side effect  Does help her symptoms    OAB  Follow-up  Tried on Vesicare at last visit  She reports improvement but not resolution of her symptoms  The degree of improvement is satisfactory. She does not desire further intervention    she declines influenza vaccine this year    Patient's complete Health Risk Assessment and screening values have been reviewed and are found in Flowsheets. The following problems were reviewed today and where indicated follow up appointments were made and/or referrals ordered.     Positive Risk Factor Screenings with Interventions:                 Weight and Activity:  Physical Activity: Inactive (11/14/2023)    Exercise Vital Sign     Days of Exercise per Week: 0 days     Minutes of

## 2023-11-15 PROBLEM — I13.0 BENIGN HYPERTENSIVE HEART AND KIDNEY DISEASE WITH HEART FAILURE AND WITH CHRONIC KIDNEY DISEASE STAGE I THROUGH STAGE IV, OR UNSPECIFIED(404.11): Status: RESOLVED | Noted: 2020-08-21 | Resolved: 2023-11-15

## 2023-12-26 RX ORDER — SOLIFENACIN SUCCINATE 5 MG/1
5 TABLET, FILM COATED ORAL DAILY
Qty: 100 TABLET | Refills: 2 | Status: SHIPPED | OUTPATIENT
Start: 2023-12-26

## 2024-01-08 DIAGNOSIS — M54.17 LUMBOSACRAL RADICULOPATHY AT S1: ICD-10-CM

## 2024-01-09 ENCOUNTER — OFFICE VISIT (OUTPATIENT)
Dept: FAMILY MEDICINE CLINIC | Age: 89
End: 2024-01-09

## 2024-01-09 VITALS
BODY MASS INDEX: 33.27 KG/M2 | RESPIRATION RATE: 16 BRPM | HEART RATE: 56 BPM | WEIGHT: 187.8 LBS | DIASTOLIC BLOOD PRESSURE: 57 MMHG | TEMPERATURE: 97.5 F | HEIGHT: 63 IN | SYSTOLIC BLOOD PRESSURE: 114 MMHG | OXYGEN SATURATION: 97 %

## 2024-01-09 DIAGNOSIS — J32.9 RHINOSINUSITIS: ICD-10-CM

## 2024-01-09 DIAGNOSIS — R09.82 POST-NASAL DRIP: Primary | ICD-10-CM

## 2024-01-09 RX ORDER — AMOXICILLIN AND CLAVULANATE POTASSIUM 875; 125 MG/1; MG/1
1 TABLET, FILM COATED ORAL 2 TIMES DAILY
Qty: 10 TABLET | Refills: 0 | Status: SHIPPED | OUTPATIENT
Start: 2024-01-09 | End: 2024-01-14

## 2024-01-09 NOTE — PROGRESS NOTES
[facility]  Department of Family Medicine  Family Medicine Residency Program      Patient: Jane Delacruz  1935 88 y.o. female     Date of Service: 1/10/2024      Chief complaint:   Chief Complaint   Patient presents with    Head Congestion     Times 1 week    Cough       HISTORY OF PRESENTING ILLNESS     Jane Delacruz is a 88 y.o. female presented to the clinic  head congestion       Thinks she may have a sinus infection   Was sick for over a week, feeling better over the last 2 days   Had a sinus headcahe, forehead, congstion   Has been taking the Astelin nasal spray, no tyelnol ,   No air humidfiers   Has been eating well.   Gets sinus infections at least once a year -feels similar   Now has a wet cough, nothing coming up with it.     Denies fever, chills, chest pain, shortness of breath, abdominal pain, nausea, vomiting, diarrhea, numbness, tingling, loss of bowel or bladder control        Past Medical History:      Diagnosis Date    Aortic ectasia (HCC) 09/2020    3.7 cm a sending aorta; ct reviewed per Dr CHANDNI Moran    Arthritis     Basal cell cancer 01/2013    nose; Dr Avery    CAD in native artery 7/10/2023    Coronary artery disease     bypass 2007; follows with Dr Cutler yearly    COVID-19 03/2023    Diastolic dysfunction 2014    stage 3; follows with Dr. Cutler yearly    Dysphagia     Hyperlipidemia     Hypertension     Hypothyroid     Impaired glucose tolerance     Liver cyst     Lumbosacral radiculopathy at S1 12/2015    left sided    Macular degeneration 06/2013    Renal cyst     seen by Dr Son; benign    Vertigo 02/2015     Past Surgical History:        Procedure Laterality Date    APPENDECTOMY      CARDIAC SURGERY  2007    double bypass @ [facility] (Dr. Nguyen)    CARPAL TUNNEL RELEASE      right    COLONOSCOPY  2007    Dr Taylor    HYSTERECTOMY (CERVIX STATUS UNKNOWN)      partial (ovaries remain)    VITRECTOMY Right 11/2/2020    RIGHT EYE PARS PLANA

## 2024-01-09 NOTE — PROGRESS NOTES
[facility]  Precepting Note    Subjective:  Feel like I might be getting a sinus infection  Similar to past episodes  Frontal tenderness  Son thinks she isnt improving.   >1 week  Feeling somewhat improved last 2 days.   Nasal congestion.   Tx with astelin,   No tylenol, pain meds needed. No fevers, chills, CP, N, v, diarrhea.   + wet cough without production.       ROS otherwise negative     Past medical, surgical, family and social history were reviewed, non-contributory, and unchanged unless otherwise stated.    Objective:    BP (!) 114/57   Pulse 56   Temp 97.5 °F (36.4 °C)   Resp 16   Ht 1.6 m (5' 3\")   Wt 85.2 kg (187 lb 12.8 oz)   LMP  (LMP Unknown)   SpO2 97%   BMI 33.27 kg/m²     Exam is as noted by resident with the following changes, additions or corrections:    General:  NAD; alert & oriented x 3   Heart:  RRR, no murmurs, gallops, or rubs.  Lungs:  CTA bilaterally, no wheeze, rales or rhonchi  Abd: bowel sounds present, nontender, nondistended, no masses  Extrem:  No clubbing, cyanosis, or edema  Mild throat erythema.   No frontal or maxillary ttp.     Assessment/Plan:  Sinusitis, possibly improving.   Augmentin - recommend starting in a few days IF improvement does not continue.  Mari feeling better past 2 days, may already be on her way.   Symptomatic Tx in the meantime.        Attending Physician Statement  I have reviewed the chart, including any radiology or labs. I have discussed the case, including pertinent history and exam findings with the resident.  I agree with the assessment, plan and orders as documented by the resident.  Please refer to the resident note for additional information.      Electronically signed by SULLY DHALIWAL MD on 1/9/2024 at 12:01 PM

## 2024-01-10 RX ORDER — GABAPENTIN 100 MG/1
100 CAPSULE ORAL 2 TIMES DAILY
Qty: 180 CAPSULE | Refills: 0 | Status: SHIPPED | OUTPATIENT
Start: 2024-01-10 | End: 2024-04-09

## 2024-01-10 ASSESSMENT — ENCOUNTER SYMPTOMS
ABDOMINAL PAIN: 0
SHORTNESS OF BREATH: 0
SINUS PRESSURE: 1
EYE REDNESS: 0
COUGH: 1
EYE PAIN: 0
NAUSEA: 0
BLOOD IN STOOL: 0
VOMITING: 0
EYE DISCHARGE: 0
DIARRHEA: 0
CHEST TIGHTNESS: 0
SINUS PAIN: 1

## 2024-01-11 ENCOUNTER — PATIENT MESSAGE (OUTPATIENT)
Dept: FAMILY MEDICINE CLINIC | Age: 89
End: 2024-01-11

## 2024-01-11 DIAGNOSIS — Z79.899 MEDICATION MANAGEMENT: ICD-10-CM

## 2024-01-11 DIAGNOSIS — E78.2 MIXED HYPERLIPIDEMIA: ICD-10-CM

## 2024-01-11 DIAGNOSIS — N18.30 STAGE 3 CHRONIC KIDNEY DISEASE, UNSPECIFIED WHETHER STAGE 3A OR 3B CKD (HCC): ICD-10-CM

## 2024-01-11 DIAGNOSIS — R13.14 PHARYNGOESOPHAGEAL DYSPHAGIA: ICD-10-CM

## 2024-01-11 DIAGNOSIS — M54.17 LUMBOSACRAL RADICULOPATHY AT S1: ICD-10-CM

## 2024-01-11 DIAGNOSIS — I10 ESSENTIAL HYPERTENSION: ICD-10-CM

## 2024-01-11 DIAGNOSIS — E03.9 ACQUIRED HYPOTHYROIDISM: ICD-10-CM

## 2024-01-11 NOTE — TELEPHONE ENCOUNTER
From: Jane Delacruz  To: Dr. Carlitos Moran  Sent: 1/11/2024 11:13 AM EST  Subject: New Insurance      Hi,  My Insurance has changed to [employer]. They said I need all my prescriptions refills sent them by the Doc. Please do 90 day refills.    FAX- [phone]  Phone - [phone]    Many Thanks,    Jane

## 2024-01-12 RX ORDER — FUROSEMIDE 20 MG/1
20 TABLET ORAL DAILY
Qty: 90 TABLET | Refills: 3 | Status: SHIPPED | OUTPATIENT
Start: 2024-01-12

## 2024-01-12 RX ORDER — GUANFACINE 1 MG/1
1 TABLET ORAL NIGHTLY
Qty: 90 TABLET | Refills: 3 | Status: SHIPPED | OUTPATIENT
Start: 2024-01-12

## 2024-01-12 RX ORDER — NIFEDIPINE 30 MG/1
30 TABLET, EXTENDED RELEASE ORAL DAILY
Qty: 90 TABLET | Refills: 3 | Status: SHIPPED | OUTPATIENT
Start: 2024-01-12

## 2024-01-12 RX ORDER — CLONIDINE HYDROCHLORIDE 0.1 MG/1
0.1 TABLET ORAL 2 TIMES DAILY
Qty: 180 TABLET | Refills: 3 | Status: SHIPPED | OUTPATIENT
Start: 2024-01-12

## 2024-01-12 RX ORDER — ENALAPRIL MALEATE 20 MG/1
20 TABLET ORAL 2 TIMES DAILY
Qty: 180 TABLET | Refills: 3 | Status: SHIPPED | OUTPATIENT
Start: 2024-01-12

## 2024-01-12 RX ORDER — LEVOTHYROXINE SODIUM 0.05 MG/1
50 TABLET ORAL DAILY
Qty: 90 TABLET | Refills: 3 | Status: SHIPPED | OUTPATIENT
Start: 2024-01-12

## 2024-01-12 RX ORDER — ATORVASTATIN CALCIUM 20 MG/1
20 TABLET, FILM COATED ORAL DAILY
Qty: 90 TABLET | Refills: 3 | Status: SHIPPED | OUTPATIENT
Start: 2024-01-12

## 2024-01-12 RX ORDER — SOLIFENACIN SUCCINATE 5 MG/1
5 TABLET, FILM COATED ORAL DAILY
Qty: 90 TABLET | Refills: 3 | Status: SHIPPED | OUTPATIENT
Start: 2024-01-12

## 2024-01-12 RX ORDER — BUSPIRONE HYDROCHLORIDE 5 MG/1
5 TABLET ORAL 2 TIMES DAILY
Qty: 180 TABLET | Refills: 1 | Status: SHIPPED | OUTPATIENT
Start: 2024-01-12

## 2024-01-12 RX ORDER — FAMOTIDINE 20 MG/1
20 TABLET, FILM COATED ORAL 2 TIMES DAILY
Qty: 180 TABLET | Refills: 3 | Status: SHIPPED | OUTPATIENT
Start: 2024-01-12

## 2024-01-12 RX ORDER — GABAPENTIN 100 MG/1
100 CAPSULE ORAL 2 TIMES DAILY
Qty: 180 CAPSULE | Refills: 0 | OUTPATIENT
Start: 2024-01-12 | End: 2024-04-11

## 2024-01-29 ENCOUNTER — PATIENT MESSAGE (OUTPATIENT)
Dept: FAMILY MEDICINE CLINIC | Age: 89
End: 2024-01-29

## 2024-01-29 DIAGNOSIS — M54.17 LUMBOSACRAL RADICULOPATHY AT S1: ICD-10-CM

## 2024-01-30 RX ORDER — GABAPENTIN 100 MG/1
100 CAPSULE ORAL 2 TIMES DAILY
Qty: 180 CAPSULE | Refills: 0 | Status: SHIPPED | OUTPATIENT
Start: 2024-01-30 | End: 2024-04-29

## 2024-01-30 NOTE — TELEPHONE ENCOUNTER
From: Jane Delacruz  To: Dr. Carlitos Moran  Sent: 1/29/2024 4:22 PM EST  Subject: Guanfacine & Gabapentin    Please send 90 day script for each to [facility]    Gabapentin can start 1st of March.  I have enough for Feb.    Thank you so much, Jane

## 2024-02-12 ENCOUNTER — OFFICE VISIT (OUTPATIENT)
Dept: FAMILY MEDICINE CLINIC | Age: 89
End: 2024-02-12
Payer: MEDICARE

## 2024-02-12 VITALS
HEART RATE: 54 BPM | DIASTOLIC BLOOD PRESSURE: 74 MMHG | BODY MASS INDEX: 33.36 KG/M2 | WEIGHT: 188.27 LBS | OXYGEN SATURATION: 97 % | SYSTOLIC BLOOD PRESSURE: 144 MMHG | TEMPERATURE: 97.4 F | RESPIRATION RATE: 16 BRPM | HEIGHT: 63 IN

## 2024-02-12 DIAGNOSIS — E78.2 MIXED HYPERLIPIDEMIA: ICD-10-CM

## 2024-02-12 DIAGNOSIS — N32.81 OVERACTIVE BLADDER: ICD-10-CM

## 2024-02-12 DIAGNOSIS — I25.10 CORONARY ARTERY DISEASE INVOLVING NATIVE CORONARY ARTERY OF NATIVE HEART WITHOUT ANGINA PECTORIS: ICD-10-CM

## 2024-02-12 DIAGNOSIS — N25.81 SECONDARY HYPERPARATHYROIDISM OF RENAL ORIGIN (HCC): ICD-10-CM

## 2024-02-12 DIAGNOSIS — I77.819 AORTIC ECTASIA (HCC): ICD-10-CM

## 2024-02-12 DIAGNOSIS — N18.32 STAGE 3B CHRONIC KIDNEY DISEASE (HCC): ICD-10-CM

## 2024-02-12 DIAGNOSIS — R12 HEARTBURN: ICD-10-CM

## 2024-02-12 DIAGNOSIS — G57.92 NEUROPATHY OF LEFT LOWER EXTREMITY: ICD-10-CM

## 2024-02-12 DIAGNOSIS — I10 ESSENTIAL HYPERTENSION: Primary | ICD-10-CM

## 2024-02-12 PROCEDURE — 1123F ACP DISCUSS/DSCN MKR DOCD: CPT | Performed by: FAMILY MEDICINE

## 2024-02-12 PROCEDURE — G2211 COMPLEX E/M VISIT ADD ON: HCPCS | Performed by: FAMILY MEDICINE

## 2024-02-12 PROCEDURE — 99213 OFFICE O/P EST LOW 20 MIN: CPT | Performed by: FAMILY MEDICINE

## 2024-02-12 NOTE — PROGRESS NOTES
wheezing.    Cardiovascular:  Positive for leg swelling. Negative for chest pain and palpitations.   Gastrointestinal:  Positive for heartburn. Negative for abdominal pain.   Genitourinary:  Negative for difficulty urinating.   Musculoskeletal:  Positive for arthralgias and arthritis.   Neurological:  Negative for dizziness and syncope.            Objective   BP (!) 144/74 (Site: Left Upper Arm)   Pulse 54   Temp 97.4 °F (36.3 °C) (Temporal)   Resp 16   Ht 1.6 m (5' 2.99\")   Wt 85.4 kg (188 lb 4.4 oz)   LMP  (LMP Unknown)   SpO2 97%   BMI 33.36 kg/m²     Physical Exam  Vitals reviewed.   Constitutional:       Appearance: Normal appearance. She is not toxic-appearing.   HENT:      Head: Normocephalic and atraumatic.      Right Ear: Tympanic membrane normal.      Left Ear: Tympanic membrane normal.      Nose: Nose normal. No congestion.      Mouth/Throat:      Mouth: Mucous membranes are moist.      Pharynx: Oropharynx is clear.   Eyes:      Conjunctiva/sclera: Conjunctivae normal.   Neck:      Vascular: No carotid bruit.   Cardiovascular:      Rate and Rhythm: Regular rhythm. Bradycardia present.      Pulses: Normal pulses.      Heart sounds: No murmur heard.  Pulmonary:      Breath sounds: Normal breath sounds. No wheezing, rhonchi or rales.   Abdominal:      General: Bowel sounds are normal. There is no distension.      Tenderness: There is no abdominal tenderness. There is no guarding or rebound.   Musculoskeletal:      Cervical back: Neck supple.      Right lower leg: Edema (tr) present.      Left lower leg: Edema (tr) present.   Lymphadenopathy:      Cervical: No cervical adenopathy.   Skin:     General: Skin is warm and dry.      Findings: No rash.   Neurological:      General: No focal deficit present.      Mental Status: She is alert. Mental status is at baseline.      Gait: Gait normal.   Psychiatric:         Mood and Affect: Mood normal.         Behavior: Behavior normal.              An electronic

## 2024-05-18 ENCOUNTER — TRANSCRIBE ORDERS (OUTPATIENT)
Dept: ADMINISTRATIVE | Age: 89
End: 2024-05-18

## 2024-05-18 DIAGNOSIS — N32.81 OVERACTIVE BLADDER: Primary | ICD-10-CM

## 2024-05-18 DIAGNOSIS — N18.32 STAGE 3B CHRONIC KIDNEY DISEASE (HCC): ICD-10-CM

## 2024-05-18 DIAGNOSIS — R33.9 RETENTION OF URINE, UNSPECIFIED: ICD-10-CM

## 2024-05-30 ENCOUNTER — HOSPITAL ENCOUNTER (OUTPATIENT)
Dept: ULTRASOUND IMAGING | Age: 89
Discharge: HOME | End: 2024-06-01
Attending: INTERNAL MEDICINE
Payer: MEDICARE

## 2024-05-30 DIAGNOSIS — N32.81 OVERACTIVE BLADDER: ICD-10-CM

## 2024-05-30 DIAGNOSIS — R33.9 RETENTION OF URINE, UNSPECIFIED: ICD-10-CM

## 2024-05-30 DIAGNOSIS — N18.32 STAGE 3B CHRONIC KIDNEY DISEASE (HCC): ICD-10-CM

## 2024-05-30 PROCEDURE — 76775 US EXAM ABDO BACK WALL LIM: CPT | Performed by: INTERNAL MEDICINE

## 2024-07-05 DIAGNOSIS — Z79.899 MEDICATION MANAGEMENT: ICD-10-CM

## 2024-07-05 RX ORDER — BUSPIRONE HYDROCHLORIDE 5 MG/1
5 TABLET ORAL 2 TIMES DAILY
Qty: 180 TABLET | Refills: 1 | Status: SHIPPED | OUTPATIENT
Start: 2024-07-05

## 2024-08-12 PROBLEM — N25.81 SECONDARY HYPERPARATHYROIDISM OF RENAL ORIGIN (HCC): Chronic | Status: ACTIVE | Noted: 2022-09-20

## 2024-08-12 ASSESSMENT — ENCOUNTER SYMPTOMS
ABDOMINAL PAIN: 0
COUGH: 0
WHEEZING: 0
SHORTNESS OF BREATH: 0
TROUBLE SWALLOWING: 0

## 2024-08-12 NOTE — PROGRESS NOTES
Jane Delacruz is a 89 y.o. year old female Established patient, here for evaluation of the following chief complaint(s):    Chief Complaint   Patient presents with    Hypertension       Jane was seen today for hypertension.    Diagnoses and all orders for this visit:    Essential hypertension    Mixed hyperlipidemia    Secondary hyperparathyroidism of renal origin (HCC)    Stage 3b chronic kidney disease (HCC)    Coronary artery disease involving native coronary artery of native heart without angina pectoris    Overactive bladder    Vitamin D deficiency          Plan    Patient's blood pressure was elevated on presentation, however on manual confirmation it is lower although still not quite at goal.  Her home cuff is off by approximately 20-40 points systolic and 10 or more points diastolic.  We discussed getting a new cuff versus seeing if this 1 can be calibrated.  She should continue to lock her pressures and report back to nephrology as planned    CKD stage III is stable.  Recent labs showed stability of secondary hyperparathyroidism.    Agreeable to lipids as above    Scheduled to see cardiology for her CAD.  Discussed that if she is going to pursue knee replacement surgery she will need to have clearance from cardiology    Declines further intervention for OAB    Patient is open to considering surgical opinion for her arthritis issues of her knees.  Will refer to orthopedics      Patient/guardian was given the opportunity to ask questions regarding the visit today.  Questions were addressed.  They verbalized comfort and understanding of the assessment and plan.    Return in about 3 months (around 11/13/2024), or if symptoms worsen or fail to improve, for AWV with me (30 min).    SUBJECTIVE/OBJECTIVE:    HPI    Vision problems  Does have dry macular degeneration- likely issues are related to this.    Continues to have visual field disruption.    Has given up driving  Saw [facility]- note in chart reviewed.  No new

## 2024-08-13 ENCOUNTER — OFFICE VISIT (OUTPATIENT)
Dept: FAMILY MEDICINE CLINIC | Age: 89
End: 2024-08-13
Payer: MEDICARE

## 2024-08-13 VITALS
RESPIRATION RATE: 16 BRPM | DIASTOLIC BLOOD PRESSURE: 88 MMHG | WEIGHT: 186.95 LBS | HEIGHT: 63 IN | SYSTOLIC BLOOD PRESSURE: 148 MMHG | OXYGEN SATURATION: 99 % | HEART RATE: 63 BPM | TEMPERATURE: 97.5 F | BODY MASS INDEX: 33.12 KG/M2

## 2024-08-13 DIAGNOSIS — N18.32 STAGE 3B CHRONIC KIDNEY DISEASE (HCC): ICD-10-CM

## 2024-08-13 DIAGNOSIS — I10 ESSENTIAL HYPERTENSION: Primary | ICD-10-CM

## 2024-08-13 DIAGNOSIS — I25.10 CORONARY ARTERY DISEASE INVOLVING NATIVE CORONARY ARTERY OF NATIVE HEART WITHOUT ANGINA PECTORIS: ICD-10-CM

## 2024-08-13 DIAGNOSIS — M17.0 OSTEOARTHRITIS OF BOTH KNEES, UNSPECIFIED OSTEOARTHRITIS TYPE: ICD-10-CM

## 2024-08-13 DIAGNOSIS — E55.9 VITAMIN D DEFICIENCY: ICD-10-CM

## 2024-08-13 DIAGNOSIS — E78.2 MIXED HYPERLIPIDEMIA: ICD-10-CM

## 2024-08-13 DIAGNOSIS — N25.81 SECONDARY HYPERPARATHYROIDISM OF RENAL ORIGIN (HCC): ICD-10-CM

## 2024-08-13 DIAGNOSIS — N32.81 OVERACTIVE BLADDER: ICD-10-CM

## 2024-08-13 LAB
CHOLESTEROL, TOTAL: 155 MG/DL
HDLC SERPL-MCNC: 65 MG/DL
LDL CHOLESTEROL: 73 MG/DL
TRIGL SERPL-MCNC: 87 MG/DL
VLDLC SERPL CALC-MCNC: 17 MG/DL

## 2024-08-13 PROCEDURE — 36415 COLL VENOUS BLD VENIPUNCTURE: CPT | Performed by: FAMILY MEDICINE

## 2024-08-13 PROCEDURE — 1123F ACP DISCUSS/DSCN MKR DOCD: CPT | Performed by: FAMILY MEDICINE

## 2024-08-13 PROCEDURE — 99214 OFFICE O/P EST MOD 30 MIN: CPT | Performed by: FAMILY MEDICINE

## 2024-08-13 SDOH — ECONOMIC STABILITY: FOOD INSECURITY: WITHIN THE PAST 12 MONTHS, THE FOOD YOU BOUGHT JUST DIDN'T LAST AND YOU DIDN'T HAVE MONEY TO GET MORE.: NEVER TRUE

## 2024-08-13 SDOH — ECONOMIC STABILITY: INCOME INSECURITY: HOW HARD IS IT FOR YOU TO PAY FOR THE VERY BASICS LIKE FOOD, HOUSING, MEDICAL CARE, AND HEATING?: NOT HARD AT ALL

## 2024-08-13 SDOH — ECONOMIC STABILITY: FOOD INSECURITY: WITHIN THE PAST 12 MONTHS, YOU WORRIED THAT YOUR FOOD WOULD RUN OUT BEFORE YOU GOT MONEY TO BUY MORE.: NEVER TRUE

## 2024-08-26 ENCOUNTER — OFFICE VISIT (OUTPATIENT)
Dept: ORTHOPEDIC SURGERY | Age: 89
End: 2024-08-26
Payer: MEDICARE

## 2024-08-26 DIAGNOSIS — M25.562 PAIN IN BOTH KNEES, UNSPECIFIED CHRONICITY: Primary | ICD-10-CM

## 2024-08-26 DIAGNOSIS — M17.0 PRIMARY OSTEOARTHRITIS OF BOTH KNEES: ICD-10-CM

## 2024-08-26 DIAGNOSIS — M25.561 PAIN IN BOTH KNEES, UNSPECIFIED CHRONICITY: Primary | ICD-10-CM

## 2024-08-26 PROCEDURE — 20610 DRAIN/INJ JOINT/BURSA W/O US: CPT | Performed by: STUDENT IN AN ORGANIZED HEALTH CARE EDUCATION/TRAINING PROGRAM

## 2024-08-26 PROCEDURE — 99204 OFFICE O/P NEW MOD 45 MIN: CPT | Performed by: STUDENT IN AN ORGANIZED HEALTH CARE EDUCATION/TRAINING PROGRAM

## 2024-08-26 PROCEDURE — 1123F ACP DISCUSS/DSCN MKR DOCD: CPT | Performed by: STUDENT IN AN ORGANIZED HEALTH CARE EDUCATION/TRAINING PROGRAM

## 2024-08-26 RX ORDER — TRIAMCINOLONE ACETONIDE 40 MG/ML
80 INJECTION, SUSPENSION INTRA-ARTICULAR; INTRAMUSCULAR ONCE
Status: COMPLETED | OUTPATIENT
Start: 2024-08-26 | End: 2024-08-26

## 2024-08-26 RX ORDER — BUPIVACAINE HYDROCHLORIDE 2.5 MG/ML
3 INJECTION, SOLUTION INFILTRATION; PERINEURAL ONCE
Status: COMPLETED | OUTPATIENT
Start: 2024-08-26 | End: 2024-08-26

## 2024-08-26 RX ADMIN — BUPIVACAINE HYDROCHLORIDE 7.5 MG: 2.5 INJECTION, SOLUTION INFILTRATION; PERINEURAL at 08:42

## 2024-08-26 RX ADMIN — TRIAMCINOLONE ACETONIDE 80 MG: 40 INJECTION, SUSPENSION INTRA-ARTICULAR; INTRAMUSCULAR at 08:42

## 2024-08-26 RX ADMIN — BUPIVACAINE HYDROCHLORIDE 7.5 MG: 2.5 INJECTION, SOLUTION INFILTRATION; PERINEURAL at 08:41

## 2024-08-26 NOTE — PROGRESS NOTES
compressible.        IMAGING:    XR: 4 views of the  bilateral knee independently interpreted by myself and discussed with patient demonstrates moderate to severe osteoarthritis with joint space narrowing, subchondral sclerosis and osteophyte formation. No acute fractures or dislocations.         ASSESSMENT  Bilateral knee moderate-to-severe osteoarthritis    PLAN  Discussed with patient.  Images were discussed with patient showing moderate-to-severe osteoarthritis.  Recommend steroid injections to bilateral knees today to help relieve her symptoms.  She can continue over-the-counter anti-inflammatories, ice, activity modification, and supervised home exercise program. If this fails to provide her relieve, we can discuss visco supplementation or surgical options.  She is happy with this treatment plan.  All questions and concerns were answered in detail.  She can follow-up as needed    Procedure Note: Knee Cortisone injection     The bilateral knees were identified as the injection site. The risk and benefits of a cortisone injection were explained and the patient consented to the injection. Under sterile conditions, each knee was injected with a mixture of 80  mg of Kenalog and 3 mL of 0.25% Marcaine without complication. A sterile bandage was applied.        GAYLE Fischer - CNP  Orthopaedic Surgery   8/26/24   8:01 AM EDT     I have seen and evaluated the patient and agree with the above assessment on today's visit. I have performed the key components of the history and physical examination and concur completely with the findings and plans as documented.    Agree with ROS, examination, FMH, PMH, PSH, SocHx, and allergies as above.        89-year-old female with bilateral moderate to severe osteoarthritis of her knees.  Steroid injection provided today.  Recommend low impact exercise and strengthening activity.  If these fail to provide relief we can consider viscosupplementation versus total knee  arthroplasty.  She will follow-up on an as-needed basis    Shmuel Murguia DO   Orthopaedic Surgery   8/26/24  8:42 AM    Note: This report was completed using computer[employer] voiced recognition software.  Every effort has been made to ensure accuracy; however, inadvertent computerized transcription errors may be present.

## 2024-10-31 DIAGNOSIS — Z79.899 MEDICATION MANAGEMENT: ICD-10-CM

## 2024-10-31 DIAGNOSIS — R13.14 PHARYNGOESOPHAGEAL DYSPHAGIA: ICD-10-CM

## 2024-10-31 RX ORDER — FAMOTIDINE 20 MG/1
20 TABLET, FILM COATED ORAL 2 TIMES DAILY
Qty: 180 TABLET | Refills: 3 | Status: SHIPPED | OUTPATIENT
Start: 2024-10-31

## 2024-10-31 NOTE — TELEPHONE ENCOUNTER
Name of Medication(s) Requested:  Requested Prescriptions     Pending Prescriptions Disp Refills    famotidine (PEPCID) 20 MG tablet 180 tablet 3     Sig: Take 1 tablet by mouth 2 times daily       Medication is on current medication list Yes    Dosage and directions were verified? Yes    Quantity verified: 90 day supply     Pharmacy Verified?  Yes    Last Appointment:  8/13/2024    Future appts:  Future Appointments   Date Time Provider Department Center   11/20/2024  7:45 AM Willi Cutler MD [facility]   11/26/2024 10:30 AM [facility]   12/4/2024  8:20 AM Shmuel Murguia, DO [facility]        (If no appt send self scheduling link. .REFILLAPPT)  Scheduling request sent?     [] Yes  [x] No    Does patient need updated?  [] Yes  [x] No

## 2024-11-20 ENCOUNTER — OFFICE VISIT (OUTPATIENT)
Dept: CARDIOLOGY CLINIC | Age: 89
End: 2024-11-20
Payer: MEDICARE

## 2024-11-20 VITALS
DIASTOLIC BLOOD PRESSURE: 60 MMHG | HEIGHT: 63 IN | SYSTOLIC BLOOD PRESSURE: 134 MMHG | BODY MASS INDEX: 33.77 KG/M2 | TEMPERATURE: 97.1 F | RESPIRATION RATE: 18 BRPM | WEIGHT: 190.6 LBS | HEART RATE: 47 BPM

## 2024-11-20 DIAGNOSIS — Z95.1 HX OF CABG: ICD-10-CM

## 2024-11-20 DIAGNOSIS — I10 ESSENTIAL HYPERTENSION: ICD-10-CM

## 2024-11-20 DIAGNOSIS — I25.10 CORONARY ARTERY DISEASE INVOLVING NATIVE CORONARY ARTERY OF NATIVE HEART WITHOUT ANGINA PECTORIS: Primary | ICD-10-CM

## 2024-11-20 DIAGNOSIS — R00.1 BRADYCARDIA: ICD-10-CM

## 2024-11-20 DIAGNOSIS — E78.2 MIXED HYPERLIPIDEMIA: ICD-10-CM

## 2024-11-20 PROCEDURE — 99214 OFFICE O/P EST MOD 30 MIN: CPT | Performed by: INTERNAL MEDICINE

## 2024-11-20 PROCEDURE — 93000 ELECTROCARDIOGRAM COMPLETE: CPT | Performed by: INTERNAL MEDICINE

## 2024-11-20 PROCEDURE — 1123F ACP DISCUSS/DSCN MKR DOCD: CPT | Performed by: INTERNAL MEDICINE

## 2024-11-20 PROCEDURE — 1159F MED LIST DOCD IN RCRD: CPT | Performed by: INTERNAL MEDICINE

## 2024-11-20 NOTE — PROGRESS NOTES
OUTPATIENT CARDIOLOGY FOLLOW-UP    Name: Jane Delacruz    Age: 89 y.o.    Date of Service: 11/20/2024    Chief Complaint: Follow-up for chest pain, CAD    Referring Physician: Carlitos Moran MD    History of Present Illness:   She denies recent chest pain. She denies LOVE, palpitations, orthopnea, or syncope. She is currently with no active cardiac complaints at rest. SB on EKG (+history of sinus bradycardia -- \"I can't tell\"). She reports, \"I feel fine\". S/p cardiac catheterization in 7/2023.    Review of Systems:  Cardiac: As per HPI  General: No fever, chills  Pulmonary: As per HPI  HEENT: No visual disturbances, difficult swallowing  GI: No nausea, vomiting  : No dysuria, hematuria  Endocrine: +hyopthyroidism, no DM  Musculoskeletal: CHASE x 4, no focal motor deficits  Skin: Intact, no rashes  Neuro: No headache, seizures  Psych: Currently with no depression, anxiety    Past Medical History:  Past Medical History:   Diagnosis Date    Aortic ectasia (HCC) 09/2020    3.7 cm a sending aorta; ct reviewed per Dr CHANDNI Moran    Arthritis     Basal cell cancer 01/2013    nose; Dr Avery    CAD in native artery 7/10/2023    Coronary artery disease     bypass 2007; follows with Dr Cutler yearly    COVID-19 03/2023    Diastolic dysfunction 2014    stage 3; follows with Dr. Cutler yearly    Dysphagia     Hyperlipidemia     Hypertension     Hypothyroid     Impaired glucose tolerance     Liver cyst     Lumbosacral radiculopathy at S1 12/2015    left sided    Macular degeneration 06/2013    Renal cyst     seen by Dr Son; benign    Vertigo 02/2015       Past Surgical History:  Past Surgical History:   Procedure Laterality Date    APPENDECTOMY      CARDIAC SURGERY  2007    double bypass @ [facility] (Dr. Nguyen)    CARPAL TUNNEL RELEASE      right    COLONOSCOPY  2007    Dr Taylor    HYSTERECTOMY (CERVIX STATUS UNKNOWN)      partial (ovaries remain)    VITRECTOMY Right 11/2/2020    RIGHT EYE PARS PLANA  VITRECTOMY 25

## 2024-11-26 ENCOUNTER — OFFICE VISIT (OUTPATIENT)
Dept: FAMILY MEDICINE CLINIC | Age: 89
End: 2024-11-26

## 2024-11-26 VITALS
SYSTOLIC BLOOD PRESSURE: 152 MMHG | BODY MASS INDEX: 33.49 KG/M2 | HEART RATE: 47 BPM | TEMPERATURE: 97.1 F | OXYGEN SATURATION: 98 % | WEIGHT: 189 LBS | DIASTOLIC BLOOD PRESSURE: 68 MMHG | RESPIRATION RATE: 15 BRPM | HEIGHT: 63 IN

## 2024-11-26 DIAGNOSIS — Z79.899 MEDICATION MANAGEMENT: ICD-10-CM

## 2024-11-26 DIAGNOSIS — R13.10 DYSPHAGIA, UNSPECIFIED TYPE: ICD-10-CM

## 2024-11-26 DIAGNOSIS — N32.81 OVERACTIVE BLADDER: ICD-10-CM

## 2024-11-26 DIAGNOSIS — M17.0 OSTEOARTHRITIS OF BOTH KNEES, UNSPECIFIED OSTEOARTHRITIS TYPE: ICD-10-CM

## 2024-11-26 DIAGNOSIS — R13.14 PHARYNGOESOPHAGEAL DYSPHAGIA: ICD-10-CM

## 2024-11-26 DIAGNOSIS — I25.10 CORONARY ARTERY DISEASE INVOLVING NATIVE CORONARY ARTERY OF NATIVE HEART WITHOUT ANGINA PECTORIS: ICD-10-CM

## 2024-11-26 DIAGNOSIS — I10 ESSENTIAL HYPERTENSION: ICD-10-CM

## 2024-11-26 DIAGNOSIS — Z00.00 MEDICARE ANNUAL WELLNESS VISIT, SUBSEQUENT: Primary | ICD-10-CM

## 2024-11-26 DIAGNOSIS — N18.32 STAGE 3B CHRONIC KIDNEY DISEASE (HCC): ICD-10-CM

## 2024-11-26 DIAGNOSIS — N25.81 SECONDARY HYPERPARATHYROIDISM OF RENAL ORIGIN (HCC): ICD-10-CM

## 2024-11-26 DIAGNOSIS — H35.30 SENILE MACULAR DEGENERATION: ICD-10-CM

## 2024-11-26 RX ORDER — FAMOTIDINE 20 MG/1
20 TABLET, FILM COATED ORAL 2 TIMES DAILY
Qty: 60 TABLET | Refills: 5 | Status: SHIPPED | OUTPATIENT
Start: 2024-11-26

## 2024-11-26 ASSESSMENT — PATIENT HEALTH QUESTIONNAIRE - PHQ9
SUM OF ALL RESPONSES TO PHQ QUESTIONS 1-9: 0
SUM OF ALL RESPONSES TO PHQ QUESTIONS 1-9: 0
2. FEELING DOWN, DEPRESSED OR HOPELESS: NOT AT ALL
1. LITTLE INTEREST OR PLEASURE IN DOING THINGS: NOT AT ALL
SUM OF ALL RESPONSES TO PHQ QUESTIONS 1-9: 0
SUM OF ALL RESPONSES TO PHQ QUESTIONS 1-9: 0
SUM OF ALL RESPONSES TO PHQ9 QUESTIONS 1 & 2: 0

## 2024-11-26 ASSESSMENT — LIFESTYLE VARIABLES
HOW OFTEN DO YOU HAVE A DRINK CONTAINING ALCOHOL: MONTHLY OR LESS
HOW MANY STANDARD DRINKS CONTAINING ALCOHOL DO YOU HAVE ON A TYPICAL DAY: 1 OR 2

## 2024-11-26 NOTE — PROGRESS NOTES
Medicare Annual Wellness Visit    Jane Delacruz is here for Medicare AWV    Assessment & Plan   Medicare annual wellness visit, subsequent  Essential hypertension  Stage 3b chronic kidney disease (HCC)  Secondary hyperparathyroidism of renal origin (HCC)  Coronary artery disease involving native coronary artery of native heart without angina pectoris  Osteoarthritis of both knees, unspecified osteoarthritis type  Overactive bladder  Dysphagia, unspecified type  -     FL ESOPHAGRAM; Future  Medication management  -     famotidine (PEPCID) 20 MG tablet; Take 1 tablet by mouth 2 times daily, Disp-60 tablet, R-5Requesting 1 year supplyNormal  Pharyngoesophageal dysphagia  -     famotidine (PEPCID) 20 MG tablet; Take 1 tablet by mouth 2 times daily, Disp-60 tablet, R-5Requesting 1 year supplyNormal  Senile macular degeneration     Plan    Overall patient with stable chronic conditions with the exception of dysphagia  Blood pressure was elevated today.  Historically pressures have been controlled at home.  She was taken off of beta-blocker due to bradycardia.  Encouraged to continue observation.  Clinically no significant symptoms reported    Renal function is stable.  Follow-up with nephrology per their recommendations.  Hyperparathyroidism is due to the CKD    Osteoarthritis of the knees is with partial improvement after steroid injection.  Patient intends to avoid surgery if possible    Worsening dysphagia.  She is on Pepcid.  We will check an esophagram to assure no obstructions.  Discussed EGD.  She wishes to hold off.    Vision issues as per her ophthalmology team    Health maintenance issues discussed as below.    Recommendations for Preventive Services Due: see orders and patient instructions/AVS.  Recommended screening schedule for the next 5-10 years is provided to the patient in written form: see Patient Instructions/AVS.     Return in 6 months (on 5/26/2025), or if symptoms worsen or fail to improve, for

## 2024-12-04 ENCOUNTER — OFFICE VISIT (OUTPATIENT)
Dept: ORTHOPEDIC SURGERY | Age: 89
End: 2024-12-04

## 2024-12-04 VITALS
DIASTOLIC BLOOD PRESSURE: 83 MMHG | SYSTOLIC BLOOD PRESSURE: 177 MMHG | HEART RATE: 57 BPM | TEMPERATURE: 97.3 F | OXYGEN SATURATION: 96 %

## 2024-12-04 DIAGNOSIS — M17.0 PRIMARY OSTEOARTHRITIS OF BOTH KNEES: Primary | ICD-10-CM

## 2024-12-04 RX ORDER — BUPIVACAINE HYDROCHLORIDE 2.5 MG/ML
3 INJECTION, SOLUTION INFILTRATION; PERINEURAL ONCE
Status: COMPLETED | OUTPATIENT
Start: 2024-12-04 | End: 2024-12-04

## 2024-12-04 RX ORDER — TRIAMCINOLONE ACETONIDE 40 MG/ML
80 INJECTION, SUSPENSION INTRA-ARTICULAR; INTRAMUSCULAR ONCE
Status: COMPLETED | OUTPATIENT
Start: 2024-12-04 | End: 2024-12-04

## 2024-12-04 RX ADMIN — BUPIVACAINE HYDROCHLORIDE 7.5 MG: 2.5 INJECTION, SOLUTION INFILTRATION; PERINEURAL at 08:28

## 2024-12-04 RX ADMIN — TRIAMCINOLONE ACETONIDE 80 MG: 40 INJECTION, SUSPENSION INTRA-ARTICULAR; INTRAMUSCULAR at 08:28

## 2024-12-04 RX ADMIN — TRIAMCINOLONE ACETONIDE 80 MG: 40 INJECTION, SUSPENSION INTRA-ARTICULAR; INTRAMUSCULAR at 08:29

## 2024-12-04 NOTE — PROGRESS NOTES
CHIEF COMPLAINT:   Chief Complaint   Patient presents with    Knee Pain     Bilateral knee pain, had CSI 8/26/24 with good relief. Would like to repeat today        HPI update 12/4/2024: Jane is here today for bilateral knee osteoarthritis.  At her last visit we tried corticosteroid injections with good relief.  She would like to repeat these today.  She has had no changes since her last visit.    HPI:    Jane Delacruz is a 89 y.o. year old female who is seen today  for evaluation of bilateral knee pain.  She states her left is worse than her right.  She states this has been ongoing for several years. She does not recall any injuries to these knees.  The pain worsens when she is going up and down stairs and standing and walking for long periods of time.  She does have some mechanical symptoms of clicking without instability.  She has tried Tylenol, ice, activity modification and previous steroid injections back in 2021.  She does not recall whether or not these injections provide her relief or not.  She denies numbness and tingling. she is ambulating without assistance.    PAST MEDICAL HISTORY  Past Medical History:   Diagnosis Date    Aortic ectasia (HCC) 09/2020    3.7 cm a sending aorta; ct reviewed per Dr CHANDNI Moran    Arthritis     Basal cell cancer 01/2013    nose; Dr Avery    CAD in native artery 7/10/2023    Coronary artery disease     bypass 2007; follows with Dr Cutler yearly    COVID-19 03/2023    Diastolic dysfunction 2014    stage 3; follows with Dr. Cutler yearly    Dysphagia     Hyperlipidemia     Hypertension     Hypothyroid     Impaired glucose tolerance     Liver cyst     Lumbosacral radiculopathy at S1 12/2015    left sided    Macular degeneration 06/2013    Renal cyst     seen by Dr Son; benign    Vertigo 02/2015       PAST SURGICAL HISTORY  Past Surgical History:   Procedure Laterality Date    APPENDECTOMY      CARDIAC SURGERY  2007    double bypass @ [facility] (Dr. Nguyen)    CARPAL TUNNEL

## 2025-01-10 DIAGNOSIS — M54.17 LUMBOSACRAL RADICULOPATHY AT S1: ICD-10-CM

## 2025-01-10 DIAGNOSIS — E78.2 MIXED HYPERLIPIDEMIA: ICD-10-CM

## 2025-01-10 DIAGNOSIS — Z79.899 MEDICATION MANAGEMENT: ICD-10-CM

## 2025-01-10 DIAGNOSIS — E03.9 ACQUIRED HYPOTHYROIDISM: ICD-10-CM

## 2025-01-10 DIAGNOSIS — I10 ESSENTIAL HYPERTENSION: ICD-10-CM

## 2025-01-10 RX ORDER — NIFEDIPINE 30 MG/1
30 TABLET, EXTENDED RELEASE ORAL DAILY
Qty: 90 TABLET | Refills: 3 | Status: SHIPPED | OUTPATIENT
Start: 2025-01-10

## 2025-01-10 RX ORDER — ATORVASTATIN CALCIUM 20 MG/1
20 TABLET, FILM COATED ORAL DAILY
Qty: 90 TABLET | Refills: 3 | Status: SHIPPED | OUTPATIENT
Start: 2025-01-10

## 2025-01-10 RX ORDER — CLONIDINE HYDROCHLORIDE 0.1 MG/1
0.1 TABLET ORAL 2 TIMES DAILY
Qty: 180 TABLET | Refills: 3 | Status: SHIPPED | OUTPATIENT
Start: 2025-01-10

## 2025-01-10 RX ORDER — ENALAPRIL MALEATE 20 MG/1
20 TABLET ORAL 2 TIMES DAILY
Qty: 180 TABLET | Refills: 3 | Status: SHIPPED | OUTPATIENT
Start: 2025-01-10

## 2025-01-10 RX ORDER — GABAPENTIN 100 MG/1
100 CAPSULE ORAL 2 TIMES DAILY
Qty: 180 CAPSULE | Refills: 0 | Status: SHIPPED | OUTPATIENT
Start: 2025-01-10 | End: 2025-04-10

## 2025-01-10 RX ORDER — LEVOTHYROXINE SODIUM 50 UG/1
50 TABLET ORAL DAILY
Qty: 90 TABLET | Refills: 3 | Status: SHIPPED | OUTPATIENT
Start: 2025-01-10

## 2025-01-10 NOTE — TELEPHONE ENCOUNTER
Name of Medication(s) Requested:  Requested Prescriptions     Pending Prescriptions Disp Refills    enalapril (VASOTEC) 20 MG tablet [Pharmacy Med Name: ENALAPRIL TAB 20MG] 180 tablet 3     Sig: TAKE 1 TABLET TWICE A DAY    levothyroxine (SYNTHROID) 50 MCG tablet [Pharmacy Med Name: LEVOTHYROXIN TAB 50MCG] 90 tablet 3     Sig: TAKE 1 TABLET DAILY    cloNIDine (CATAPRES) 0.1 MG tablet [Pharmacy Med Name: CLONIDINE TAB 0.1MG] 180 tablet 3     Sig: TAKE 1 TABLET TWICE A DAY    gabapentin (NEURONTIN) 100 MG capsule [Pharmacy Med Name: GABAPENTIN CAP 100MG] 180 capsule 3     Sig: Take 1 capsule by mouth 2 times daily.    atorvastatin (LIPITOR) 20 MG tablet [Pharmacy Med Name: ATORVASTATIN TAB 20MG] 90 tablet 3     Sig: TAKE 1 TABLET DAILY    NIFEdipine (PROCARDIA XL) 30 MG extended release tablet [Pharmacy Med Name: NIFEDIPIN XL TAB 30MG ER] 90 tablet 3     Sig: TAKE 1 TABLET DAILY       Medication is on current medication list Yes    Dosage and directions were verified? Yes    Quantity verified: 90 day supply     Pharmacy Verified?  Yes    Last Appointment:  11/26/2024    Future appts:  Future Appointments   Date Time Provider Department Center   3/5/2025  8:10 AM Shmuel Murguia DO [facility]   6/3/2025  9:00 AM Carlitos Moran MD [facility]   12/2/2025  8:00 AM Willi Cutler MD [place] [facility]   12/9/2025  9:00 AM Carlitos Moran MD [facility]        (If no appt send self scheduling link. .REFILLAPPT)  Scheduling request sent?     [] Yes  [x] No    Does patient need updated?  [] Yes  [x] No

## 2025-01-16 ENCOUNTER — OFFICE VISIT (OUTPATIENT)
Dept: FAMILY MEDICINE CLINIC | Age: 89
End: 2025-01-16

## 2025-01-16 VITALS
HEIGHT: 63 IN | WEIGHT: 184 LBS | SYSTOLIC BLOOD PRESSURE: 136 MMHG | TEMPERATURE: 97.1 F | DIASTOLIC BLOOD PRESSURE: 62 MMHG | HEART RATE: 77 BPM | RESPIRATION RATE: 16 BRPM | BODY MASS INDEX: 32.6 KG/M2 | OXYGEN SATURATION: 98 %

## 2025-01-16 DIAGNOSIS — M54.32 SCIATICA, LEFT SIDE: Primary | ICD-10-CM

## 2025-01-16 SDOH — ECONOMIC STABILITY: FOOD INSECURITY: WITHIN THE PAST 12 MONTHS, THE FOOD YOU BOUGHT JUST DIDN'T LAST AND YOU DIDN'T HAVE MONEY TO GET MORE.: NEVER TRUE

## 2025-01-16 SDOH — ECONOMIC STABILITY: FOOD INSECURITY: WITHIN THE PAST 12 MONTHS, YOU WORRIED THAT YOUR FOOD WOULD RUN OUT BEFORE YOU GOT MONEY TO BUY MORE.: NEVER TRUE

## 2025-01-16 ASSESSMENT — ENCOUNTER SYMPTOMS
RHINORRHEA: 0
DIARRHEA: 0
SHORTNESS OF BREATH: 0
NAUSEA: 0
COUGH: 0
VOMITING: 0
CONSTIPATION: 0
EYE PAIN: 0
BACK PAIN: 0

## 2025-01-16 ASSESSMENT — PATIENT HEALTH QUESTIONNAIRE - PHQ9
SUM OF ALL RESPONSES TO PHQ QUESTIONS 1-9: 0
2. FEELING DOWN, DEPRESSED OR HOPELESS: NOT AT ALL
SUM OF ALL RESPONSES TO PHQ9 QUESTIONS 1 & 2: 0
SUM OF ALL RESPONSES TO PHQ QUESTIONS 1-9: 0
SUM OF ALL RESPONSES TO PHQ QUESTIONS 1-9: 0
1. LITTLE INTEREST OR PLEASURE IN DOING THINGS: NOT AT ALL
SUM OF ALL RESPONSES TO PHQ QUESTIONS 1-9: 0

## 2025-01-16 NOTE — PROGRESS NOTES
[facility]  Precepting Note    Subjective:  Warm sensation down left leg  X 3 weeks or so  Similar location to her sciatica  No associated pain though   Not hot  Can't think of any inciting factors   Intermittent   PCP recommended she be seen  No new bowel/bladder issues  No groin numbness   Not affecting her mobility     ROS otherwise negative     Past medical, surgical, family and social history were reviewed, non-contributory, and unchanged unless otherwise stated.    Objective:    BP (!) 155/86   Pulse 77   Temp 97.1 °F (36.2 °C) (Temporal)   Resp 16   Ht 1.6 m (5' 2.99\")   Wt 83.5 kg (184 lb)   LMP  (LMP Unknown)   SpO2 98%   BMI 32.60 kg/m²     Exam is as noted by resident with which I agree    Assessment/Plan:  Presumed radicular issue  Conservative management  Precautions explicitly reviewed     Attending Physician Statement  I have reviewed the chart, including any radiology or labs. I have discussed the case, including pertinent history and exam findings with the resident.  I agree with the assessment, plan and orders as documented by the resident.  Please refer to the resident note for additional information.      Electronically signed by Jennifer Sharif MD on 1/16/2025 at 11:25 AM

## 2025-01-16 NOTE — PROGRESS NOTES
[facility]  Department of Family Medicine  Family Medicine Residency Program      Patient: Jane Delacruz 89 y.o. female     Date of Service: 1/16/25        Chief complaint:   Chief Complaint   Patient presents with    Leg Problem     Feels like hot water running down back of left leg. Comes and goes for several weeks. No pain         HISTORY OF PRESENTING ILLNESS     89 y.o. female is an established patient with a PMHx of CAD, HTN, hypothyroid, neuropathy, CKD 3B, who presents to the clinic for warm sensation down her leg.    Left leg burning sensation  - Starts over her hip, down left side of leg  - Same thing happened years ago, said it was sciatic nerve  - Started a few weeks ago  - No specific event  - Comes and goes  - Nothing causes it  - Does not effect mobility  - No new incontinence  - No groin area numbness        Health Maintenance:  Health Maintenance Due   Topic Date Due    Respiratory Syncytial Virus (RSV) Pregnant or age 60 yrs+ (1 - 1-dose 75+ series) Never done    COVID-19 Vaccine (4 - 2023-24 season) 09/01/2024           Past Medical History:      Diagnosis Date    Aortic ectasia (HCC) 09/2020    3.7 cm a sending aorta; ct reviewed per Dr CHANDNI Moran    Arthritis     Basal cell cancer 01/2013    nose; Dr Avery    CAD in native artery 7/10/2023    Coronary artery disease     bypass 2007; follows with Dr Cutler yearly    COVID-19 03/2023    Diastolic dysfunction 2014    stage 3; follows with Dr. Cutler yearly    Dysphagia     Hyperlipidemia     Hypertension     Hypothyroid     Impaired glucose tolerance     Liver cyst     Lumbosacral radiculopathy at S1 12/2015    left sided    Macular degeneration 06/2013    Renal cyst     seen by Dr Son; benign    Vertigo 02/2015           Past Surgical History:        Procedure Laterality Date    APPENDECTOMY      CARDIAC SURGERY  2007    double bypass @ [facility] (Dr. Nguyen)    CARPAL TUNNEL RELEASE      right    COLONOSCOPY

## 2025-02-19 ENCOUNTER — TELEPHONE (OUTPATIENT)
Dept: FAMILY MEDICINE CLINIC | Age: 89
End: 2025-02-19

## 2025-02-19 RX ORDER — SOLIFENACIN SUCCINATE 5 MG/1
5 TABLET, FILM COATED ORAL DAILY
Qty: 90 TABLET | Refills: 3 | OUTPATIENT
Start: 2025-02-19

## 2025-02-19 NOTE — TELEPHONE ENCOUNTER
Patient needs a new script for handicap placard.    When finished, can be left at  for son to  at his appointment.

## 2025-02-25 DIAGNOSIS — Z79.899 MEDICATION MANAGEMENT: ICD-10-CM

## 2025-02-25 RX ORDER — FUROSEMIDE 20 MG/1
20 TABLET ORAL DAILY
Qty: 90 TABLET | Refills: 3 | Status: SHIPPED | OUTPATIENT
Start: 2025-02-25

## 2025-02-25 NOTE — TELEPHONE ENCOUNTER
Name of Medication(s) Requested:  Requested Prescriptions     Pending Prescriptions Disp Refills    furosemide (LASIX) 20 MG tablet 90 tablet 3     Sig: Take 1 tablet by mouth daily       Medication is on current medication list Yes    Dosage and directions were verified? Yes    Quantity verified: 90 day supply     Pharmacy Verified?  Yes    Last Appointment:  11/26/2024    Future appts:  Future Appointments   Date Time Provider Department Center   3/5/2025  8:10 AM Shmuel Murguia DO  [facility]   6/3/2025  9:00 AM Carlitos Moran MD [facility]   12/2/2025  8:00 AM Willi Cutler MD [place] [facility]   12/9/2025  9:00 AM Carlitos Moran MD [facility]        (If no appt send self scheduling link. .REFILLAPPT)  Scheduling request sent?     [] Yes  [x] No    Does patient need updated?  [] Yes  [x] No

## 2025-03-05 ENCOUNTER — OFFICE VISIT (OUTPATIENT)
Dept: ORTHOPEDIC SURGERY | Age: 89
End: 2025-03-05

## 2025-03-05 VITALS
TEMPERATURE: 97.4 F | RESPIRATION RATE: 12 BRPM | DIASTOLIC BLOOD PRESSURE: 84 MMHG | HEART RATE: 54 BPM | SYSTOLIC BLOOD PRESSURE: 162 MMHG | OXYGEN SATURATION: 95 %

## 2025-03-05 DIAGNOSIS — M17.0 PRIMARY OSTEOARTHRITIS OF BOTH KNEES: Primary | ICD-10-CM

## 2025-03-05 RX ORDER — TRIAMCINOLONE ACETONIDE 40 MG/ML
80 INJECTION, SUSPENSION INTRA-ARTICULAR; INTRAMUSCULAR ONCE
Status: COMPLETED | OUTPATIENT
Start: 2025-03-05 | End: 2025-03-05

## 2025-03-05 RX ORDER — BUPIVACAINE HYDROCHLORIDE 2.5 MG/ML
3 INJECTION, SOLUTION INFILTRATION; PERINEURAL ONCE
Status: COMPLETED | OUTPATIENT
Start: 2025-03-05 | End: 2025-03-05

## 2025-03-05 RX ADMIN — TRIAMCINOLONE ACETONIDE 80 MG: 40 INJECTION, SUSPENSION INTRA-ARTICULAR; INTRAMUSCULAR at 08:11

## 2025-03-05 RX ADMIN — BUPIVACAINE HYDROCHLORIDE 7.5 MG: 2.5 INJECTION, SOLUTION INFILTRATION; PERINEURAL at 08:11

## 2025-03-05 NOTE — PROGRESS NOTES
Multiple Vitamins-Minerals (PRESERVISION AREDS) TABS, Take 1 tablet by mouth 2 times daily, Disp: , Rfl:     nitroGLYCERIN (NITROSTAT) 0.4 MG SL tablet, Place 1 tablet under the tongue every 5 minutes as needed for Chest pain, Disp: 25 tablet, Rfl: 3    Biotin 20947 MCG TABS, Take 1 tablet by mouth every morning , Disp: , Rfl:     ALLERGIES  No Known Allergies    Controlled Substances Monitoring:          REVIEW OF SYSTEMS:     Constitutional:  Negative for weight loss, fevers, chills, fatigue  Cardiovascular: Negative for chest pain, palpitations  Pulmonary: Negative for shortness of breath, labored breathing, cough  GI: negative for abdominal pain, nausea, vomitting   MSK: per HPI  Skin: negative for rash, open wounds    All other systems reviewed and are negative         PHYSICAL EXAM     Vitals:    03/05/25 0757   BP: (!) 162/84   Pulse: 54   Resp: 12   Temp: 97.4 °F (36.3 °C)   SpO2: 95%       Height:    Weight: [unfilled]  BMI:  There is no height or weight on file to calculate BMI.    General: The patient is alert and oriented x 3, appears to be stated age and in no distress.   HEENT: head is normocephalic, atraumatic.  EOMI.   Neck: supple, trachea midline, no thyromegaly   Cardiovascular: peripheral pulses palpable.  Normal Capillary refill   Respiratory: breathing unlabored, chest expansion symmetric   Skin: no rash, no open wounds, no erythema  Psych: normal affect; mood stable  Neurologic: gait normal, sensation grossly intact in extremities  MSK:        Lower Extremity:   Ipsilateral hip exam shows normal range of motion without pain with impingement testing.      Knee: On visual inspection there is no obvious deformity to the bilateral knee, no erythema, edema, ecchymosis, open wounds.  There is no decreased sensation to light touch throughout the entire lower extremity.  Patient is grossly neurovascularly intact.    Bilateral Knee:  Patient is tender to Palpation  over the medial and lateral joint

## 2025-03-21 ENCOUNTER — TELEPHONE (OUTPATIENT)
Dept: FAMILY MEDICINE CLINIC | Age: 89
End: 2025-03-21

## 2025-03-21 NOTE — TELEPHONE ENCOUNTER
Called patient.  Prior authorization is needed on guanfacine.  Prior -auth completed and approved.  Patient notified and verbalizes understanding.

## 2025-03-21 NOTE — TELEPHONE ENCOUNTER
Last Appointment   1/16/2025  Next Appointment  6/24/2025    Patient calling in requesting a return call from Dr. Moran's nurse. She can be reached on her mobile number.

## 2025-04-02 DIAGNOSIS — N18.30 STAGE 3 CHRONIC KIDNEY DISEASE, UNSPECIFIED WHETHER STAGE 3A OR 3B CKD (HCC): ICD-10-CM

## 2025-04-02 DIAGNOSIS — I10 ESSENTIAL HYPERTENSION: ICD-10-CM

## 2025-04-02 DIAGNOSIS — R13.14 PHARYNGOESOPHAGEAL DYSPHAGIA: ICD-10-CM

## 2025-04-02 DIAGNOSIS — Z79.899 MEDICATION MANAGEMENT: ICD-10-CM

## 2025-04-02 RX ORDER — FAMOTIDINE 20 MG/1
20 TABLET, FILM COATED ORAL 2 TIMES DAILY
Qty: 180 TABLET | Refills: 3 | Status: SHIPPED | OUTPATIENT
Start: 2025-04-02

## 2025-04-02 RX ORDER — GUANFACINE 1 MG/1
1 TABLET ORAL NIGHTLY
Qty: 90 TABLET | Refills: 3 | Status: SHIPPED | OUTPATIENT
Start: 2025-04-02

## 2025-04-02 RX ORDER — SOLIFENACIN SUCCINATE 5 MG/1
5 TABLET, FILM COATED ORAL DAILY
Qty: 90 TABLET | Refills: 3 | Status: SHIPPED | OUTPATIENT
Start: 2025-04-02

## 2025-04-02 NOTE — TELEPHONE ENCOUNTER
Name of Medication(s) Requested:  Requested Prescriptions     Pending Prescriptions Disp Refills    guanFACINE (TENEX) 1 MG tablet [Pharmacy Med Name: GUANFACINE TABS 1MG] 90 tablet 3     Sig: Take 1 tablet by mouth nightly    solifenacin (VESICARE) 5 MG tablet [Pharmacy Med Name: SOLIFENACIN SUCCINATE TABS 5MG] 90 tablet 3     Sig: Take 1 tablet by mouth daily    famotidine (PEPCID) 20 MG tablet [Pharmacy Med Name: FAMOTIDINE TABS 20MG] 180 tablet 3     Sig: Take 1 tablet by mouth 2 times daily       Medication is on current medication list Yes    Dosage and directions were verified? Yes    Quantity verified: 90 day supply     Pharmacy Verified?  Yes    Last Appointment:  11/26/2024    Future appts:  Future Appointments   Date Time Provider Department [place]   6/4/2025  8:10 AM Shmuel Murguia DO [facility]   6/24/2025 10:30 AM Carlitos Moran MD [facility]   12/2/2025  8:00 AM Willi Cutler MD [place] [facility]   12/9/2025  9:00 AM Carlitos Moran MD [facility]        (If no appt send self scheduling link. .REFILLAPPT)  Scheduling request sent?     [] Yes  [x] No    Does patient need updated?  [] Yes  [x] No

## 2025-04-03 ENCOUNTER — TELEPHONE (OUTPATIENT)
Dept: FAMILY MEDICINE CLINIC | Age: 89
End: 2025-04-03

## 2025-04-03 NOTE — TELEPHONE ENCOUNTER
Pharmacy calling- Solifenacin 5mg is not covered by pt insurance- pharmacy calling to find out if PCP would like to prescribe one of the following that are covered.     Covered meds that can be sent in:   oxybutynin or tolterodine     [facility]- [phone] Spoke to Rufino     Reference #: [id number]

## 2025-04-04 RX ORDER — TOLTERODINE 4 MG/1
4 CAPSULE, EXTENDED RELEASE ORAL DAILY
Qty: 90 CAPSULE | Refills: 3 | Status: SHIPPED | OUTPATIENT
Start: 2025-04-04

## 2025-04-06 DIAGNOSIS — M54.17 LUMBOSACRAL RADICULOPATHY AT S1: ICD-10-CM

## 2025-04-06 DIAGNOSIS — Z79.899 MEDICATION MANAGEMENT: ICD-10-CM

## 2025-04-07 RX ORDER — GABAPENTIN 100 MG/1
100 CAPSULE ORAL 2 TIMES DAILY
Qty: 180 CAPSULE | Refills: 0 | Status: SHIPPED | OUTPATIENT
Start: 2025-04-07 | End: 2025-07-06

## 2025-04-07 NOTE — TELEPHONE ENCOUNTER
Name of Medication(s) Requested:  Requested Prescriptions     Pending Prescriptions Disp Refills    gabapentin (NEURONTIN) 100 MG capsule [Pharmacy Med Name: GABAPENTIN CAPS 100MG] 180 capsule 3     Sig: Take 1 capsule by mouth 2 times daily.       Medication is on current medication list Yes    Dosage and directions were verified? Yes    Quantity verified: 90 day supply     Pharmacy Verified?  Yes    Last Appointment:  11/26/2024    Future appts:  Future Appointments   Date Time Provider Department Center   4/23/2025  9:40 AM Shmuel Murguia, DO [facility]   6/4/2025  8:10 AM Shmuel Murguia, DO [facility]   6/24/2025 10:30 AM Carlitos Moran MD [facility]   12/2/2025  8:00 AM Willi Cutler MD [place] [facility]   12/9/2025  9:00 AM Carlitos Moran MD [facility] DE[facility]        (If no appt send self scheduling link. .REFILLAPPT)  Scheduling request sent?     [] Yes  [x] No    Does patient need updated?  [] Yes  [x] No

## 2025-04-08 RX ORDER — FUROSEMIDE 20 MG/1
20 TABLET ORAL DAILY
Qty: 90 TABLET | Refills: 1 | Status: SHIPPED | OUTPATIENT
Start: 2025-04-08

## 2025-04-08 NOTE — TELEPHONE ENCOUNTER
Name of Medication(s) Requested:  Requested Prescriptions     Pending Prescriptions Disp Refills    furosemide (LASIX) 20 MG tablet [Pharmacy Med Name: FUROSEMIDE TABS 20MG] 90 tablet 1     Sig: Take 1 tablet by mouth daily       Medication is on current medication list Yes    Dosage and directions were verified? Yes    Quantity verified: 90 day supply     Pharmacy Verified?  Yes    Last Appointment:  11/26/2024    Future appts:  Future Appointments   Date Time Provider Department Center   4/23/2025  9:40 AM Shmuel Murguia, DO [facility]   6/4/2025  8:10 AM Shmuel Murguia DO [facility]   6/24/2025 10:30 AM Carlitos Moran MD [place] [facility] DE[facility]   12/2/2025  8:00 AM Willi Cutler MD [place] [facility]   12/9/2025  9:00 AM Carlitos Moran MD [place] [facility]        (If no appt send self scheduling link. .REFILLAPPT)  Scheduling request sent?     [] Yes  [x] No    Does patient need updated?  [] Yes  [x] No

## 2025-04-23 ENCOUNTER — OFFICE VISIT (OUTPATIENT)
Dept: ORTHOPEDIC SURGERY | Age: 89
End: 2025-04-23
Payer: MEDICARE

## 2025-04-23 VITALS
OXYGEN SATURATION: 95 % | HEART RATE: 60 BPM | DIASTOLIC BLOOD PRESSURE: 80 MMHG | TEMPERATURE: 98.1 F | SYSTOLIC BLOOD PRESSURE: 148 MMHG | RESPIRATION RATE: 16 BRPM

## 2025-04-23 DIAGNOSIS — M25.561 RIGHT KNEE PAIN, UNSPECIFIED CHRONICITY: Primary | ICD-10-CM

## 2025-04-23 PROCEDURE — 1160F RVW MEDS BY RX/DR IN RCRD: CPT

## 2025-04-23 PROCEDURE — 99214 OFFICE O/P EST MOD 30 MIN: CPT

## 2025-04-23 PROCEDURE — 1125F AMNT PAIN NOTED PAIN PRSNT: CPT

## 2025-04-23 PROCEDURE — 1123F ACP DISCUSS/DSCN MKR DOCD: CPT

## 2025-04-23 PROCEDURE — 1159F MED LIST DOCD IN RCRD: CPT

## 2025-05-30 ENCOUNTER — TELEPHONE (OUTPATIENT)
Dept: ORTHOPEDIC SURGERY | Age: 89
End: 2025-05-30

## 2025-05-30 NOTE — TELEPHONE ENCOUNTER
Patient called, scheduled for CSI on 6/4/2025 bilateral knees.  These have not worked well and asked if she could change to Visco injections bilateral knees.    Informed her message would be sent to Anabela and Leatha regarding above.  Not sure if [employer] needs auth for these injections and if so turn around time.  Will notify patient on Monday to keep appt for Wednesday or not based on need for auth and permission to move forward with Visco.

## 2025-06-03 NOTE — TELEPHONE ENCOUNTER
Patient scheduled for Euflexxa #1 of 3 on 6/4/2025.  Will need to make appt's for 2nd and 3rd injections.

## 2025-06-04 ENCOUNTER — OFFICE VISIT (OUTPATIENT)
Dept: ORTHOPEDIC SURGERY | Age: 89
End: 2025-06-04
Payer: MEDICARE

## 2025-06-04 VITALS
RESPIRATION RATE: 12 BRPM | TEMPERATURE: 98.3 F | SYSTOLIC BLOOD PRESSURE: 139 MMHG | OXYGEN SATURATION: 96 % | DIASTOLIC BLOOD PRESSURE: 77 MMHG | HEART RATE: 63 BPM

## 2025-06-04 DIAGNOSIS — M25.562 PAIN IN BOTH KNEES, UNSPECIFIED CHRONICITY: ICD-10-CM

## 2025-06-04 DIAGNOSIS — M17.0 PRIMARY OSTEOARTHRITIS OF BOTH KNEES: Primary | ICD-10-CM

## 2025-06-04 DIAGNOSIS — M25.561 PAIN IN BOTH KNEES, UNSPECIFIED CHRONICITY: ICD-10-CM

## 2025-06-04 PROCEDURE — 20610 DRAIN/INJ JOINT/BURSA W/O US: CPT | Performed by: STUDENT IN AN ORGANIZED HEALTH CARE EDUCATION/TRAINING PROGRAM

## 2025-06-04 NOTE — PROGRESS NOTES
Follow Up Visit for Injection    Jane Delacruz returns for follow up visit for Euflexxa injection # 1 to bilateral knees. She has failed conservative treatment thus far including IA steroid injections, activity modifications, OTC anti-inflammatories and supervised HEP. She denies any injury since previous visit. Denies any fever or chills.     Physical Exam:   General: Alert and oriented x3, no acute distress  Cardiovascular/pulmonary: No labored breathing, peripheral perfusion intact  Musculoskeletal:    Bilateral knees: Skin circumferentially intact.  No edema, ecchymosis, erythema noted.    Imaging: Reviewed     Procedure Note: Knee viscosupplementation injection     The bilateral knee was identified as the injection site. The risk and benefits of injection were explained and the patient consented to the injection. Under sterile conditions, the knee was injected with a full dose of Euflexxa. A sterile bandage was applied.      Assessment/Plan: S/p bilateral knee Euflexxa injection #1 for osteoarthritis    Continue ice/elevation/OTC anti-inflammatories  Follow up in 1 week for #2 injection    GAYLE Fischer-CNP  Orthopaedic Surgery   6/4/25  8:05 AM

## 2025-06-11 ENCOUNTER — OFFICE VISIT (OUTPATIENT)
Dept: ORTHOPEDIC SURGERY | Age: 89
End: 2025-06-11

## 2025-06-11 VITALS
RESPIRATION RATE: 12 BRPM | HEART RATE: 68 BPM | DIASTOLIC BLOOD PRESSURE: 79 MMHG | SYSTOLIC BLOOD PRESSURE: 137 MMHG | TEMPERATURE: 97.9 F | OXYGEN SATURATION: 97 %

## 2025-06-11 DIAGNOSIS — M17.0 PRIMARY OSTEOARTHRITIS OF BOTH KNEES: Primary | ICD-10-CM

## 2025-06-11 NOTE — PROGRESS NOTES
Follow Up Visit for Injection    Jane Delacruz returns for follow up visit for Euflexxa injection # 2 to bilateral knees. She has failed conservative treatment thus far including IA steroid injections, activity modifications, OTC anti-inflammatories and supervised HEP. She denies any injury since previous visit. Denies any fever or chills.     Physical Exam:   General: Alert and oriented x3, no acute distress  Cardiovascular/pulmonary: No labored breathing, peripheral perfusion intact  Musculoskeletal:    Bilateral knees: Skin circumferentially intact.  No edema, ecchymosis, erythema noted.    Imaging: Reviewed     Procedure Note: Knee viscosupplementation injection     The bilateral knee was identified as the injection site. The risk and benefits of injection were explained and the patient consented to the injection. Under sterile conditions, the knee was injected with a full dose of Euflexxa. A sterile bandage was applied.      Assessment/Plan: S/p bilateral knee Euflexxa injection #1 for osteoarthritis    Continue ice/elevation/OTC anti-inflammatories  Follow up in 1 week for #3 injection            Shmuel Murguia DO   Orthopaedic Surgery   6/11/25  8:17 AM    Note: This report was completed using [employer] voiced recognition software.  Every effort has been made to ensure accuracy; however, inadvertent computerized transcription errors may be present.

## 2025-06-13 ENCOUNTER — TELEPHONE (OUTPATIENT)
Dept: FAMILY MEDICINE CLINIC | Age: 89
End: 2025-06-13

## 2025-06-13 NOTE — TELEPHONE ENCOUNTER
Patient called with skin complaints on outer side of left thigh from knee to hip for several weeks.  She said it feels like a burn or a scrape. She does get injections in her left knee and she has had 2.  Unsure if that could be the cause.  The sensation started after the 1st injection but does not seem to come from the knee.  Asked for a sooner appointment than 6/24.  No redness or warmth in area.  No other symptoms. Please advise.

## 2025-06-13 NOTE — TELEPHONE ENCOUNTER
Last Appointment   1/16/2025  Next Appointment  6/24/2025    Patient called in requesting a return call from Dr. Moran's nurse. She did not disclose what matter it is regarding.

## 2025-06-17 ENCOUNTER — OFFICE VISIT (OUTPATIENT)
Dept: FAMILY MEDICINE CLINIC | Age: 89
End: 2025-06-17
Payer: MEDICARE

## 2025-06-17 VITALS
HEIGHT: 63 IN | OXYGEN SATURATION: 98 % | WEIGHT: 179.4 LBS | RESPIRATION RATE: 16 BRPM | BODY MASS INDEX: 31.79 KG/M2 | HEART RATE: 60 BPM | DIASTOLIC BLOOD PRESSURE: 77 MMHG | SYSTOLIC BLOOD PRESSURE: 128 MMHG | TEMPERATURE: 97.9 F

## 2025-06-17 DIAGNOSIS — M54.17 LUMBOSACRAL RADICULOPATHY AT S1: ICD-10-CM

## 2025-06-17 DIAGNOSIS — G57.12 MERALGIA PARESTHETICA OF LEFT SIDE: Primary | ICD-10-CM

## 2025-06-17 PROCEDURE — 1159F MED LIST DOCD IN RCRD: CPT

## 2025-06-17 PROCEDURE — 99213 OFFICE O/P EST LOW 20 MIN: CPT

## 2025-06-17 PROCEDURE — 1123F ACP DISCUSS/DSCN MKR DOCD: CPT

## 2025-06-17 PROCEDURE — 1125F AMNT PAIN NOTED PAIN PRSNT: CPT

## 2025-06-17 RX ORDER — GABAPENTIN 100 MG/1
200 CAPSULE ORAL 2 TIMES DAILY
Qty: 120 CAPSULE | Refills: 0 | Status: SHIPPED | OUTPATIENT
Start: 2025-06-17 | End: 2025-07-17

## 2025-06-17 NOTE — PROGRESS NOTES
[facility]  Department of Family Medicine  Family Medicine Residency Program    Jane Delacruz (:  1935) is a 89 y.o. female,Established patient, here for evaluation of the following chief complaint(s):  Leg Pain (L thigh times 6 weeks/Burning pain )      ASSESSMENT/PLAN:    1. Meralgia paresthetica of left side  Increase gabapentin dose to 200 mg twice daily.  She has already been on this medication for radiculopathy as below.  Counseled on avoiding tight clothing and being more physically active to avoid seated compression of lateral femoral cutaneous nerve  - gabapentin (NEURONTIN) 100 MG capsule; Take 2 capsules by mouth 2 times daily for 30 days.  Dispense: 120 capsule; Refill: 0    2. Lumbosacral radiculopathy at S1  - gabapentin (NEURONTIN) 100 MG capsule; Take 2 capsules by mouth 2 times daily for 30 days.  Dispense: 120 capsule; Refill: 0      Return if symptoms worsen or fail to improve.    SUBJECTIVE/OBJECTIVE:  HPI    L thigh pain x6 weeks  Constant pain, no exacerbating or alleviating factors  No rash  No swelling  No fevers or chills  Has been on gabapentin 100 mg BID for years  She has always had more swelling of the left lower extremity compared to the right because of vein harvesting in  for CABG            Diagnosis Date    Aortic ectasia 2020    3.7 cm a sending aorta; ct reviewed per Dr CHANDNI Moran    Arthritis     Basal cell cancer 2013    nose; Dr Avery    CAD in native artery 7/10/2023    Coronary artery disease     bypass ; follows with Dr Cutler yearly    COVID-19 2023    Diastolic dysfunction 2014    stage 3; follows with Dr. Cutler yearly    Dysphagia     Hyperlipidemia     Hypertension     Hypothyroid     Impaired glucose tolerance     Liver cyst     Lumbosacral radiculopathy at S1 2015    left sided    Macular degeneration 2013    Renal cyst     seen by Dr Son; benign    Vertigo 2015           Procedure Laterality Date

## 2025-06-17 NOTE — PROGRESS NOTES
S: 89 y.o. female with   Chief Complaint   Patient presents with    Leg Pain     L thigh times 6 weeks  Burning pain        Left lateral thigh pain - started 6 weeks ago, describes as superficial burning, hx of vein grafting on left leg, denies, rash, fever, chills, back pain, weakness, numbness    O: VS:  height is 1.6 m (5' 3\") and weight is 81.4 kg (179 lb 6.4 oz). Her temporal temperature is 97.9 °F (36.6 °C). Her blood pressure is 128/77 and her pulse is 60. Her respiration is 16 and oxygen saturation is 98%.   BP Readings from Last 3 Encounters:   06/17/25 128/77   06/11/25 137/79   06/04/25 139/77     See resident note    Impression/Plan:   1) Meralgia paresthetica - increase gabapentin to 200 mg BID, encouraged increased activity      Health Maintenance Due   Topic Date Due    Respiratory Syncytial Virus (RSV) Pregnant or age 60 yrs+ (1 - 1-dose 75+ series) Never done    COVID-19 Vaccine (4 - 2024-25 season) 09/01/2024       Attending Physician Statement  I have discussed the case, including pertinent history and exam findings with the resident.  I agree with the documented assessment and plan.      Lio Waldrop,

## 2025-06-18 ENCOUNTER — OFFICE VISIT (OUTPATIENT)
Dept: ORTHOPEDIC SURGERY | Age: 89
End: 2025-06-18
Payer: MEDICARE

## 2025-06-18 VITALS
SYSTOLIC BLOOD PRESSURE: 133 MMHG | DIASTOLIC BLOOD PRESSURE: 75 MMHG | HEART RATE: 58 BPM | OXYGEN SATURATION: 99 % | TEMPERATURE: 97.9 F

## 2025-06-18 DIAGNOSIS — M17.0 PRIMARY OSTEOARTHRITIS OF BOTH KNEES: Primary | ICD-10-CM

## 2025-06-18 PROCEDURE — 20610 DRAIN/INJ JOINT/BURSA W/O US: CPT

## 2025-06-18 NOTE — PROGRESS NOTES
Follow Up Visit for Injection    Jane Delacruz returns for follow up visit for Euflexxa injection # 3 to bilateral knees. She has failed conservative treatment thus far including IA steroid injections, activity modifications, OTC anti-inflammatories and supervised HEP. She states she has some stiffness bilaterally. She denies any injury since previous visit. Denies any fever or chills.     Physical Exam:   General: Alert and oriented x3, no acute distress  Cardiovascular/pulmonary: No labored breathing, peripheral perfusion intact  Musculoskeletal:    Bilateral knees: Skin circumferentially intact.  No edema, ecchymosis, erythema noted.    Imaging: Reviewed     Procedure Note: Knee viscosupplementation injection     The bilateral knee was identified as the injection site. The risk and benefits of injection were explained and the patient consented to the injection. Under sterile conditions, the knee was injected with a full dose of Euflexxa. A sterile bandage was applied.      Assessment/Plan: S/p bilateral knee Euflexxa injection #1 for osteoarthritis    Continue ice/elevation/OTC anti-inflammatories  Follow up as needed    GAYLE Fischer-CNP  Orthopedic Surgery   06/18/25  8:11 AM      Note: This report was completed using [employer] voiced recognition software.  Every effort has been made to ensure accuracy; however, inadvertent computerized transcription errors may be present.

## 2025-06-24 ENCOUNTER — HOSPITAL ENCOUNTER (OUTPATIENT)
Dept: GENERAL RADIOLOGY | Age: 89
Discharge: HOME | End: 2025-06-26
Payer: MEDICARE

## 2025-06-24 ENCOUNTER — OFFICE VISIT (OUTPATIENT)
Dept: FAMILY MEDICINE CLINIC | Age: 89
End: 2025-06-24
Payer: MEDICARE

## 2025-06-24 VITALS
SYSTOLIC BLOOD PRESSURE: 122 MMHG | HEART RATE: 58 BPM | BODY MASS INDEX: 31.54 KG/M2 | HEIGHT: 63 IN | DIASTOLIC BLOOD PRESSURE: 71 MMHG | RESPIRATION RATE: 15 BRPM | WEIGHT: 178 LBS | TEMPERATURE: 97.5 F | OXYGEN SATURATION: 97 %

## 2025-06-24 DIAGNOSIS — G57.12 MERALGIA PARESTHETICA OF LEFT SIDE: ICD-10-CM

## 2025-06-24 DIAGNOSIS — M79.621 PAIN OF RIGHT UPPER ARM: ICD-10-CM

## 2025-06-24 DIAGNOSIS — M54.17 LUMBOSACRAL RADICULOPATHY AT S1: ICD-10-CM

## 2025-06-24 DIAGNOSIS — M17.0 PRIMARY OSTEOARTHRITIS OF BOTH KNEES: Primary | ICD-10-CM

## 2025-06-24 DIAGNOSIS — I25.10 CORONARY ARTERY DISEASE INVOLVING NATIVE CORONARY ARTERY OF NATIVE HEART WITHOUT ANGINA PECTORIS: ICD-10-CM

## 2025-06-24 DIAGNOSIS — R13.10 DYSPHAGIA, UNSPECIFIED TYPE: ICD-10-CM

## 2025-06-24 DIAGNOSIS — N18.32 STAGE 3B CHRONIC KIDNEY DISEASE (HCC): ICD-10-CM

## 2025-06-24 DIAGNOSIS — N32.81 OVERACTIVE BLADDER: ICD-10-CM

## 2025-06-24 DIAGNOSIS — E78.2 MIXED HYPERLIPIDEMIA: ICD-10-CM

## 2025-06-24 LAB
ALBUMIN: 4.2 G/DL (ref 3.5–5.2)
ALP BLD-CCNC: 160 U/L (ref 35–104)
ALT SERPL-CCNC: 15 U/L (ref 0–35)
ANION GAP SERPL CALCULATED.3IONS-SCNC: 13 MMOL/L (ref 7–16)
AST SERPL-CCNC: 23 U/L (ref 0–35)
BILIRUB SERPL-MCNC: 0.4 MG/DL (ref 0–1.2)
BUN BLDV-MCNC: 19 MG/DL (ref 8–23)
CALCIUM SERPL-MCNC: 9.8 MG/DL (ref 8.8–10.2)
CHLORIDE BLD-SCNC: 106 MMOL/L (ref 98–107)
CHOLESTEROL, TOTAL: 139 MG/DL
CO2: 24 MMOL/L (ref 22–29)
CREAT SERPL-MCNC: 1.2 MG/DL (ref 0.5–1)
GFR, ESTIMATED: 44 ML/MIN/1.73M2
GLUCOSE BLD-MCNC: 127 MG/DL (ref 74–99)
HDLC SERPL-MCNC: 58 MG/DL
LDL CHOLESTEROL: 63 MG/DL
POTASSIUM SERPL-SCNC: 4.4 MMOL/L (ref 3.5–5.1)
SODIUM BLD-SCNC: 143 MMOL/L (ref 136–145)
TOTAL PROTEIN: 6.9 G/DL (ref 6.4–8.3)
TRIGL SERPL-MCNC: 93 MG/DL
VLDLC SERPL CALC-MCNC: 19 MG/DL

## 2025-06-24 PROCEDURE — 99214 OFFICE O/P EST MOD 30 MIN: CPT | Performed by: FAMILY MEDICINE

## 2025-06-24 PROCEDURE — G2211 COMPLEX E/M VISIT ADD ON: HCPCS | Performed by: FAMILY MEDICINE

## 2025-06-24 PROCEDURE — 1123F ACP DISCUSS/DSCN MKR DOCD: CPT | Performed by: FAMILY MEDICINE

## 2025-06-24 PROCEDURE — 73030 X-RAY EXAM OF SHOULDER: CPT

## 2025-06-24 PROCEDURE — 1159F MED LIST DOCD IN RCRD: CPT | Performed by: FAMILY MEDICINE

## 2025-06-24 NOTE — PROGRESS NOTES
Jane Delacruz is a 89 y.o. year old female Established patient, here for evaluation of the following chief complaint(s):    Chief Complaint   Patient presents with    Hypertension    Bradycardia    Arm Pain     Right arm pain for several weeks           Assessment & Plan  1. Hypertension: Stable.  - Blood pressure readings at home are mostly in the 140s and 150s systolic, with diastolic pressures in the 60s and 70s.  However blood pressures are well-controlled here in the office.  - Reports feeling good without symptoms such as pressure in the head, chest, or eye pain.  - Current medication regimen will be maintained.  - Any changes will be deferred to nephrologist Dr. Roque's judgment.    2. Bradycardia: Stable.  - Heart rate is mostly in the 60s, with a few readings in the 70s and high 50s.  - Not experiencing symptoms such as dizziness or feeling like passing out.  - No changes to the current treatment plan are necessary.  - Monitoring will continue to ensure stability.    3. Right shoulder and upper arm pain: Acute.  - Reports soreness in the right upper arm, especially when reaching or lifting objects, present for a couple of weeks with slight improvement.  - No history of injury or fall.  - Physical exam shows pain with certain movements but no specific sore spots.  - An x-ray of the shoulder will be ordered to rule out impingement otherwise suspect deltoid strain.  - Consider adding exercises with physical therapy for the shoulder and arm.    4. Knee arthritis and leg weakness: Chronic.  - Reports stiffness in knees and weakness in legs, likely due to reduced movement from knee pain.  - Home health physical therapy will be ordered to improve strength and mobility.  - Follow-up with the orthopedic doctor every 6 months, with the next appointment in December.    5. Meralgia paresthetica: Chronic.  - Experiences nerve pain in the left leg, likely due to compression from prolonged sitting.  - Advised to increase

## 2025-06-25 ENCOUNTER — RESULTS FOLLOW-UP (OUTPATIENT)
Dept: FAMILY MEDICINE CLINIC | Age: 89
End: 2025-06-25

## 2025-06-25 DIAGNOSIS — Z79.899 MEDICATION MANAGEMENT: ICD-10-CM

## 2025-06-25 RX ORDER — BUSPIRONE HYDROCHLORIDE 5 MG/1
5 TABLET ORAL 2 TIMES DAILY
Qty: 180 TABLET | Refills: 3 | Status: SHIPPED | OUTPATIENT
Start: 2025-06-25

## 2025-06-25 NOTE — TELEPHONE ENCOUNTER
Name of Medication(s) Requested:  Requested Prescriptions     Pending Prescriptions Disp Refills    busPIRone (BUSPAR) 5 MG tablet [Pharmacy Med Name: BUSPIRONE HCL TABS 5MG] 180 tablet 3     Sig: TAKE 1 TABLET TWICE A DAY       Medication is on current medication list Yes    Dosage and directions were verified? Yes    Quantity verified: 90 day supply     Pharmacy Verified?  Yes    Last Appointment:  6/24/2025    Future appts:  Future Appointments   Date Time Provider Department Center   12/2/2025  8:00 AM Willi Cutler MD [place] [facility]   12/9/2025  9:00 AM Carlitos [facility] MD [facility]   12/17/2025  8:20 AM Shmuel Murguia, DO [facility]        (If no appt send self scheduling link. .REFILLAPPT)  Scheduling request sent?     [] Yes  [x] No    Does patient need updated?  [] Yes  [x] No

## 2025-07-03 RX ORDER — TOLTERODINE 4 MG/1
4 CAPSULE, EXTENDED RELEASE ORAL DAILY
Qty: 90 CAPSULE | Refills: 3 | OUTPATIENT
Start: 2025-07-03

## 2025-07-19 ENCOUNTER — HOSPITAL ENCOUNTER (EMERGENCY)
Age: 89
Discharge: HOME | End: 2025-07-19
Attending: EMERGENCY MEDICINE
Payer: MEDICARE

## 2025-07-19 ENCOUNTER — APPOINTMENT (OUTPATIENT)
Dept: GENERAL RADIOLOGY | Age: 89
End: 2025-07-19
Payer: MEDICARE

## 2025-07-19 ENCOUNTER — APPOINTMENT (OUTPATIENT)
Dept: CT IMAGING | Age: 89
End: 2025-07-19
Payer: MEDICARE

## 2025-07-19 VITALS
WEIGHT: 175 LBS | HEART RATE: 52 BPM | TEMPERATURE: 98.1 F | BODY MASS INDEX: 31.01 KG/M2 | RESPIRATION RATE: 20 BRPM | OXYGEN SATURATION: 99 % | DIASTOLIC BLOOD PRESSURE: 60 MMHG | SYSTOLIC BLOOD PRESSURE: 182 MMHG | HEIGHT: 63 IN

## 2025-07-19 DIAGNOSIS — I71.21 ANEURYSM OF ASCENDING AORTA WITHOUT RUPTURE: ICD-10-CM

## 2025-07-19 DIAGNOSIS — R00.1 SINUS BRADYCARDIA: ICD-10-CM

## 2025-07-19 DIAGNOSIS — M79.601 RIGHT ARM PAIN: Primary | ICD-10-CM

## 2025-07-19 LAB
ALBUMIN SERPL-MCNC: 3.7 G/DL (ref 3.5–5.2)
ALP SERPL-CCNC: 151 U/L (ref 35–104)
ALT SERPL-CCNC: 25 U/L (ref 0–35)
ANION GAP SERPL CALCULATED.3IONS-SCNC: 9 MMOL/L (ref 7–16)
AST SERPL-CCNC: 27 U/L (ref 0–35)
BASOPHILS # BLD: 0.04 K/UL (ref 0–0.2)
BASOPHILS NFR BLD: 1 % (ref 0–2)
BILIRUB SERPL-MCNC: 0.4 MG/DL (ref 0–1.2)
BUN SERPL-MCNC: 16 MG/DL (ref 8–23)
CALCIUM SERPL-MCNC: 9.1 MG/DL (ref 8.8–10.2)
CHLORIDE SERPL-SCNC: 109 MMOL/L (ref 98–107)
CO2 SERPL-SCNC: 26 MMOL/L (ref 22–29)
CREAT SERPL-MCNC: 1 MG/DL (ref 0.5–1)
D-DIMER QUANTITATIVE: 518 NG/ML DDU (ref 0–230)
EKG ATRIAL RATE: 52 BPM
EKG P AXIS: 59 DEGREES
EKG P-R INTERVAL: 216 MS
EKG Q-T INTERVAL: 470 MS
EKG QRS DURATION: 72 MS
EKG QTC CALCULATION (BAZETT): 437 MS
EKG R AXIS: 12 DEGREES
EKG T AXIS: 19 DEGREES
EKG VENTRICULAR RATE: 52 BPM
EOSINOPHIL # BLD: 0.2 K/UL (ref 0.05–0.5)
EOSINOPHILS RELATIVE PERCENT: 4 % (ref 0–6)
ERYTHROCYTE [DISTWIDTH] IN BLOOD BY AUTOMATED COUNT: 13.9 % (ref 11.5–15)
GFR, ESTIMATED: 56 ML/MIN/1.73M2
GLUCOSE SERPL-MCNC: 123 MG/DL (ref 74–99)
HCT VFR BLD AUTO: 39.9 % (ref 34–48)
HGB BLD-MCNC: 13.1 G/DL (ref 11.5–15.5)
IMM GRANULOCYTES # BLD AUTO: <0.03 K/UL (ref 0–0.58)
IMM GRANULOCYTES NFR BLD: 0 % (ref 0–5)
LYMPHOCYTES NFR BLD: 1.36 K/UL (ref 1.5–4)
LYMPHOCYTES RELATIVE PERCENT: 29 % (ref 20–42)
MCH RBC QN AUTO: 30.2 PG (ref 26–35)
MCHC RBC AUTO-ENTMCNC: 32.8 G/DL (ref 32–34.5)
MCV RBC AUTO: 91.9 FL (ref 80–99.9)
MONOCYTES NFR BLD: 0.38 K/UL (ref 0.1–0.95)
MONOCYTES NFR BLD: 8 % (ref 2–12)
NEUTROPHILS NFR BLD: 58 % (ref 43–80)
NEUTS SEG NFR BLD: 2.77 K/UL (ref 1.8–7.3)
PLATELET # BLD AUTO: 191 K/UL (ref 130–450)
PMV BLD AUTO: 10.9 FL (ref 7–12)
POTASSIUM SERPL-SCNC: 4.2 MMOL/L (ref 3.5–5.1)
PROT SERPL-MCNC: 6 G/DL (ref 6.4–8.3)
RBC # BLD AUTO: 4.34 M/UL (ref 3.5–5.5)
SODIUM SERPL-SCNC: 144 MMOL/L (ref 136–145)
TROPONIN I SERPL HS-MCNC: 18 NG/L (ref 0–14)
WBC OTHER # BLD: 4.8 K/UL (ref 4.5–11.5)

## 2025-07-19 PROCEDURE — 71045 X-RAY EXAM CHEST 1 VIEW: CPT

## 2025-07-19 PROCEDURE — 6360000004 HC RX CONTRAST MEDICATION: Performed by: RADIOLOGY

## 2025-07-19 PROCEDURE — 80053 COMPREHEN METABOLIC PANEL: CPT

## 2025-07-19 PROCEDURE — 93005 ELECTROCARDIOGRAM TRACING: CPT | Performed by: EMERGENCY MEDICINE

## 2025-07-19 PROCEDURE — 85025 COMPLETE CBC W/AUTO DIFF WBC: CPT

## 2025-07-19 PROCEDURE — 99285 EMERGENCY DEPT VISIT HI MDM: CPT

## 2025-07-19 PROCEDURE — 84484 ASSAY OF TROPONIN QUANT: CPT

## 2025-07-19 PROCEDURE — 85379 FIBRIN DEGRADATION QUANT: CPT

## 2025-07-19 PROCEDURE — 71275 CT ANGIOGRAPHY CHEST: CPT

## 2025-07-19 RX ORDER — IOPAMIDOL 755 MG/ML
75 INJECTION, SOLUTION INTRAVASCULAR
Status: COMPLETED | OUTPATIENT
Start: 2025-07-19 | End: 2025-07-19

## 2025-07-19 RX ORDER — SOLIFENACIN SUCCINATE 5 MG/1
5 TABLET, FILM COATED ORAL DAILY
COMMUNITY
End: 2025-07-25

## 2025-07-19 RX ADMIN — IOPAMIDOL 75 ML: 755 INJECTION, SOLUTION INTRAVENOUS at 12:13

## 2025-07-19 ASSESSMENT — PAIN DESCRIPTION - PAIN TYPE
TYPE: ACUTE PAIN
TYPE: ACUTE PAIN

## 2025-07-19 ASSESSMENT — PAIN DESCRIPTION - DESCRIPTORS
DESCRIPTORS: ACHING;SORE
DESCRIPTORS: ACHING;DULL

## 2025-07-19 ASSESSMENT — PAIN DESCRIPTION - FREQUENCY
FREQUENCY: CONTINUOUS
FREQUENCY: CONTINUOUS

## 2025-07-19 ASSESSMENT — PAIN DESCRIPTION - ONSET
ONSET: ON-GOING
ONSET: ON-GOING

## 2025-07-19 ASSESSMENT — PAIN - FUNCTIONAL ASSESSMENT
PAIN_FUNCTIONAL_ASSESSMENT: NONE - DENIES PAIN
PAIN_FUNCTIONAL_ASSESSMENT: ACTIVITIES ARE NOT PREVENTED
PAIN_FUNCTIONAL_ASSESSMENT: 0-10
PAIN_FUNCTIONAL_ASSESSMENT: ACTIVITIES ARE NOT PREVENTED
PAIN_FUNCTIONAL_ASSESSMENT: 0-10

## 2025-07-19 ASSESSMENT — PAIN DESCRIPTION - LOCATION
LOCATION: ARM
LOCATION: ARM

## 2025-07-19 ASSESSMENT — PAIN SCALES - GENERAL
PAINLEVEL_OUTOF10: 5
PAINLEVEL_OUTOF10: 5

## 2025-07-19 ASSESSMENT — PAIN DESCRIPTION - ORIENTATION
ORIENTATION: RIGHT;UPPER
ORIENTATION: RIGHT

## 2025-07-19 NOTE — ED PROVIDER NOTES
[facility] EMERGENCY DEPARTMENT  EMERGENCY DEPARTMENT ENCOUNTER        Pt Name: Jane Delacruz  MRN: [id number]  Birthdate 1935  Date of evaluation: 7/19/2025  Provider: Romina Hill DO  PCP: Carlitos Moran MD  Note Started: 10:07 AM EDT 7/19/25    CHIEF COMPLAINT       Chief Complaint   Patient presents with    Arm Pain     For approx one month c/o Rt upper arm pain; saw pcp and had xrays but pain persists        HISTORY OF PRESENT ILLNESS: 1 or more Elements   History From: patient and son    Limitations to history : None    Jane Delacruz is a 89 y.o. female who presents with right arm pain, points to right bicep, beginning about a month ago.  Has been seen in primary care clinic for this, had x-rays to evaluate for impingement, states she is no better.  It is worse with movement and very reproducible, she denies any trauma or paresthesias.  She denies chest pain or shortness of breath.  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  The complaint has been persistent, moderate in severity, and worsened by movement.  Patient denies fever/chills, sore throat, cough, congestion, chest pain, shortness of breath, edema, headache, visual disturbances, focal paresthesias, focal weakness, abdominal pain, nausea, vomiting, diarrhea, constipation, dysuria, hematuria, trauma, neck or back pain, rash or other complaints.      Nursing Notes were all reviewed and agreed with or any disagreements were addressed in the HPI.    REVIEW OF SYSTEMS :           Positives and Pertinent negatives as per HPI.     SURGICAL HISTORY     Past Surgical History:   Procedure Laterality Date    APPENDECTOMY      CARDIAC SURGERY  2007    double bypass @ [facility] (Dr. Nguyen)    CARPAL TUNNEL RELEASE      right    COLONOSCOPY  2007    Dr Taylor    HYSTERECTOMY (CERVIX STATUS

## 2025-07-22 LAB
EKG ATRIAL RATE: 52 BPM
EKG P AXIS: 59 DEGREES
EKG P-R INTERVAL: 216 MS
EKG Q-T INTERVAL: 470 MS
EKG QRS DURATION: 72 MS
EKG QTC CALCULATION (BAZETT): 437 MS
EKG R AXIS: 12 DEGREES
EKG T AXIS: 19 DEGREES
EKG VENTRICULAR RATE: 52 BPM

## 2025-07-22 PROCEDURE — 93010 ELECTROCARDIOGRAM REPORT: CPT | Performed by: INTERNAL MEDICINE

## 2025-07-23 ENCOUNTER — TELEPHONE (OUTPATIENT)
Dept: FAMILY MEDICINE CLINIC | Age: 89
End: 2025-07-23

## 2025-07-23 NOTE — TELEPHONE ENCOUNTER
Pt son Irving called asking to speak to harvinder, did not elaborate on what was going on- just asked to speak to Harvinder and asked that she call back ASAP as he has to leave shortly for an appointment but wants to speak about an issue with his mom first.

## 2025-07-24 NOTE — TELEPHONE ENCOUNTER
There has been some confusion on this medication.  Called patient to clarify if this is needed.  Son will return call and confirm.

## 2025-07-24 NOTE — TELEPHONE ENCOUNTER
Name of Medication(s) Requested:  Requested Prescriptions     Pending Prescriptions Disp Refills    solifenacin (VESICARE) 5 MG tablet [Pharmacy Med Name: SOLIFENACIN SUCCINATE TABS 5MG]  0       Medication is on current medication list Yes    Dosage and directions were verified? Yes    Quantity verified: 90 day supply     Pharmacy Verified?  Yes    Last Appointment:  6/24/2025    Future appts:  Future Appointments   Date Time Provider Department Center   12/2/2025  8:00 AM Willi Cutler MD [facility]   12/9/2025  9:00 AM [facility]   12/17/2025  8:20 AM Shmuel Murguia, DO [facility]        (If no appt send self scheduling link. .REFILLAPPT)  Scheduling request sent?     [] Yes  [x] No    Does patient need updated?  [] Yes  [x] No

## 2025-07-25 RX ORDER — SOLIFENACIN SUCCINATE 5 MG/1
5 TABLET, FILM COATED ORAL DAILY
Qty: 90 TABLET | Refills: 3 | Status: SHIPPED | OUTPATIENT
Start: 2025-07-25

## 2025-08-04 ENCOUNTER — TELEPHONE (OUTPATIENT)
Dept: FAMILY MEDICINE CLINIC | Age: 89
End: 2025-08-04

## 2025-08-05 RX ORDER — LIDOCAINE 4 G/G
1 PATCH TOPICAL DAILY
Qty: 30 PATCH | Refills: 0 | Status: SHIPPED | OUTPATIENT
Start: 2025-08-05 | End: 2025-09-04

## 2025-08-05 RX ORDER — OXYBUTYNIN CHLORIDE 10 MG/1
10 TABLET, EXTENDED RELEASE ORAL DAILY
Qty: 90 TABLET | Refills: 1 | Status: SHIPPED | OUTPATIENT
Start: 2025-08-05

## 2025-08-19 ENCOUNTER — HOSPITAL ENCOUNTER (OUTPATIENT)
Dept: GENERAL RADIOLOGY | Age: 89
Discharge: HOME | End: 2025-08-21
Payer: MEDICARE

## 2025-08-19 ENCOUNTER — OFFICE VISIT (OUTPATIENT)
Dept: FAMILY MEDICINE CLINIC | Age: 89
End: 2025-08-19
Payer: MEDICARE

## 2025-08-19 VITALS
WEIGHT: 179 LBS | SYSTOLIC BLOOD PRESSURE: 146 MMHG | HEIGHT: 63 IN | TEMPERATURE: 97.7 F | BODY MASS INDEX: 31.71 KG/M2 | RESPIRATION RATE: 16 BRPM | DIASTOLIC BLOOD PRESSURE: 78 MMHG | OXYGEN SATURATION: 96 % | HEART RATE: 66 BPM

## 2025-08-19 DIAGNOSIS — R15.9 INCONTINENCE OF FECES, UNSPECIFIED FECAL INCONTINENCE TYPE: ICD-10-CM

## 2025-08-19 DIAGNOSIS — Z79.899 MEDICATION MANAGEMENT: ICD-10-CM

## 2025-08-19 DIAGNOSIS — M67.921 TENDINOPATHY OF RIGHT BICEPS TENDON: Primary | ICD-10-CM

## 2025-08-19 DIAGNOSIS — F41.9 ANXIETY: ICD-10-CM

## 2025-08-19 PROCEDURE — 99214 OFFICE O/P EST MOD 30 MIN: CPT | Performed by: FAMILY MEDICINE

## 2025-08-19 PROCEDURE — 1159F MED LIST DOCD IN RCRD: CPT | Performed by: FAMILY MEDICINE

## 2025-08-19 PROCEDURE — 1123F ACP DISCUSS/DSCN MKR DOCD: CPT | Performed by: FAMILY MEDICINE

## 2025-08-19 PROCEDURE — 74018 RADEX ABDOMEN 1 VIEW: CPT

## 2025-08-19 RX ORDER — TRAMADOL HYDROCHLORIDE 50 MG/1
50 TABLET ORAL
Qty: 30 TABLET | Refills: 1 | Status: SHIPPED | OUTPATIENT
Start: 2025-08-19 | End: 2025-10-18

## 2025-08-19 RX ORDER — GABAPENTIN 100 MG/1
200 CAPSULE ORAL 2 TIMES DAILY
Qty: 120 CAPSULE | Refills: 2 | Status: SHIPPED | OUTPATIENT
Start: 2025-08-19 | End: 2025-11-17

## 2025-08-19 RX ORDER — BUSPIRONE HYDROCHLORIDE 5 MG/1
5 TABLET ORAL 3 TIMES DAILY
Qty: 270 TABLET | Refills: 3 | Status: SHIPPED | OUTPATIENT
Start: 2025-08-19

## 2025-08-20 PROBLEM — R15.9 INCONTINENCE OF FECES: Status: ACTIVE | Noted: 2025-08-20

## 2025-08-20 PROBLEM — M67.921 TENDINOPATHY OF RIGHT BICEPS TENDON: Status: ACTIVE | Noted: 2025-08-20

## 2025-09-05 ENCOUNTER — TELEPHONE (OUTPATIENT)
Dept: FAMILY MEDICINE CLINIC | Age: 89
End: 2025-09-05

## 2025-09-05 RX ORDER — TOLTERODINE TARTRATE 1 MG/1
1 TABLET, EXTENDED RELEASE ORAL 2 TIMES DAILY
Qty: 60 TABLET | Refills: 3 | Status: SHIPPED | OUTPATIENT
Start: 2025-09-05

## (undated) DEVICE — DRAPE MICSCP FULL FLD STRL OCULUS ZEISS

## (undated) DEVICE — GLOVE SURG SZ 65 CRM LTX FREE POLYISOPRENE POLYMER BEAD ANTI

## (undated) DEVICE — GLOVE SURG SZ 7 CRM LTX FREE POLYISOPRENE POLYMER BEAD ANTI

## (undated) DEVICE — Device

## (undated) DEVICE — NEEDLE FLTR 18GA L1.5IN MEM THK5UM BLNT DISP

## (undated) DEVICE — REVOLUTION DSP 25G ENDGRASPING FORCEPS: Brand: ALCON GRIESHABER REVOLUTION

## (undated) DEVICE — NEEDLE ANES 23GA L1.5IN RETROBLB

## (undated) DEVICE — 3M™ TEGADERM™ TRANSPARENT FILM DRESSING FRAME STYLE, 1626W, 4 IN X 4-3/4 IN (10 CM X 12 CM), 50/CT 4CT/CASE: Brand: 3M™ TEGADERM™

## (undated) DEVICE — SYRINGE, LUER LOCK, 10ML: Brand: MEDLINE

## (undated) DEVICE — SYRINGE, LUER LOCK, 5ML: Brand: MEDLINE

## (undated) DEVICE — SOLUTION IV IRRIG 500ML 0.9% SODIUM CHL 2F7123

## (undated) DEVICE — TAPE ADH W1INXL10YD WHT PAPR GENTLE BRTH FLX COMFORTABLE

## (undated) DEVICE — 40436 HEAD REST OCULAR: Brand: 40436 HEAD REST OCULAR

## (undated) DEVICE — LENS MICSCP ENH W FLD BIOM

## (undated) DEVICE — SOLUTION IV IRRIG WATER 1000ML POUR BRL 2F7114

## (undated) DEVICE — PAD PREP L 2 PLY 70% ISO ALC NONWOVEN SFT ABSRB TOP ANTISEP

## (undated) DEVICE — BIOM OPTIC SET DISPOSABLE, WITH BIOM HD FLEX LENS FOR F=175 MM OR F=200 MM: Brand: BIOM OPTIC SET DISPOSABLE, WITH BIOM HD FLEX LENS FOR F=175 MM OR F=200 MM

## (undated) DEVICE — SET 25G PAN 1

## (undated) DEVICE — SHIELD EYE W3XL2.5IN UNIV CLR PLAS LTWT

## (undated) DEVICE — GOWN,SIRUS,FABRNF,XL,20/CS: Brand: MEDLINE

## (undated) DEVICE — GLOVE ORANGE PI 7   MSG9070

## (undated) DEVICE — LENS VITRCTMY OCU FLAT DISP

## (undated) DEVICE — SET VITRECTOMY

## (undated) DEVICE — SOLUTION IV IRRIG POUR BRL 0.9% SODIUM CHL 2F7124

## (undated) DEVICE — SURGICAL PROCEDURE PACK 25 GA VLV CPM 10K

## (undated) DEVICE — SYRINGE 20ML LL S/C 50

## (undated) DEVICE — CANNULA SURG 25GA ATRAUM SLT SFT 6 PER BX

## (undated) DEVICE — PACK PROCEDURE SURG RETINAL ST ES CUST

## (undated) DEVICE — SET 25G PAN 2